# Patient Record
Sex: FEMALE | Race: WHITE | NOT HISPANIC OR LATINO | Employment: OTHER | URBAN - METROPOLITAN AREA
[De-identification: names, ages, dates, MRNs, and addresses within clinical notes are randomized per-mention and may not be internally consistent; named-entity substitution may affect disease eponyms.]

---

## 2017-05-01 ENCOUNTER — GENERIC CONVERSION - ENCOUNTER (OUTPATIENT)
Dept: OTHER | Facility: OTHER | Age: 77
End: 2017-05-01

## 2017-06-01 ENCOUNTER — GENERIC CONVERSION - ENCOUNTER (OUTPATIENT)
Dept: OTHER | Facility: OTHER | Age: 77
End: 2017-06-01

## 2017-06-05 ENCOUNTER — ALLSCRIPTS OFFICE VISIT (OUTPATIENT)
Dept: OTHER | Facility: OTHER | Age: 77
End: 2017-06-05

## 2017-06-05 ENCOUNTER — TRANSCRIBE ORDERS (OUTPATIENT)
Dept: ADMINISTRATIVE | Facility: HOSPITAL | Age: 77
End: 2017-06-05

## 2017-06-05 ENCOUNTER — APPOINTMENT (OUTPATIENT)
Dept: LAB | Facility: HOSPITAL | Age: 77
End: 2017-06-05
Attending: INTERNAL MEDICINE
Payer: COMMERCIAL

## 2017-06-05 DIAGNOSIS — I48.91 ATRIAL FIBRILLATION (HCC): ICD-10-CM

## 2017-06-05 DIAGNOSIS — G47.33 OBSTRUCTIVE SLEEP APNEA (ADULT) (PEDIATRIC): Primary | ICD-10-CM

## 2017-06-05 DIAGNOSIS — G47.33 OBSTRUCTIVE SLEEP APNEA: ICD-10-CM

## 2017-06-05 DIAGNOSIS — I48.91 ATRIAL FIBRILLATION, UNSPECIFIED TYPE (HCC): Primary | ICD-10-CM

## 2017-06-05 LAB
ANION GAP SERPL CALCULATED.3IONS-SCNC: 3 MMOL/L (ref 4–13)
BASOPHILS # BLD AUTO: 0.03 THOUSANDS/ΜL (ref 0–0.1)
BASOPHILS NFR BLD AUTO: 1 % (ref 0–1)
BUN SERPL-MCNC: 14 MG/DL (ref 5–25)
CALCIUM SERPL-MCNC: 9.1 MG/DL (ref 8.3–10.1)
CHLORIDE SERPL-SCNC: 106 MMOL/L (ref 100–108)
CO2 SERPL-SCNC: 33 MMOL/L (ref 21–32)
CREAT SERPL-MCNC: 0.98 MG/DL (ref 0.6–1.3)
EOSINOPHIL # BLD AUTO: 0.25 THOUSAND/ΜL (ref 0–0.61)
EOSINOPHIL NFR BLD AUTO: 5 % (ref 0–6)
ERYTHROCYTE [DISTWIDTH] IN BLOOD BY AUTOMATED COUNT: 12.9 % (ref 11.6–15.1)
GFR SERPL CREATININE-BSD FRML MDRD: 55 ML/MIN/1.73SQ M
GLUCOSE SERPL-MCNC: 101 MG/DL (ref 65–140)
HCT VFR BLD AUTO: 38.8 % (ref 34.8–46.1)
HGB BLD-MCNC: 12.7 G/DL (ref 11.5–15.4)
LYMPHOCYTES # BLD AUTO: 1.65 THOUSANDS/ΜL (ref 0.6–4.47)
LYMPHOCYTES NFR BLD AUTO: 33 % (ref 14–44)
MCH RBC QN AUTO: 32.1 PG (ref 26.8–34.3)
MCHC RBC AUTO-ENTMCNC: 32.7 G/DL (ref 31.4–37.4)
MCV RBC AUTO: 98 FL (ref 82–98)
MONOCYTES # BLD AUTO: 0.5 THOUSAND/ΜL (ref 0.17–1.22)
MONOCYTES NFR BLD AUTO: 10 % (ref 4–12)
NEUTROPHILS # BLD AUTO: 2.61 THOUSANDS/ΜL (ref 1.85–7.62)
NEUTS SEG NFR BLD AUTO: 51 % (ref 43–75)
NRBC BLD AUTO-RTO: 0 /100 WBCS
NT-PROBNP SERPL-MCNC: 874 PG/ML
PLATELET # BLD AUTO: 184 THOUSANDS/UL (ref 149–390)
PMV BLD AUTO: 10.2 FL (ref 8.9–12.7)
POTASSIUM SERPL-SCNC: 4.3 MMOL/L (ref 3.5–5.3)
RBC # BLD AUTO: 3.96 MILLION/UL (ref 3.81–5.12)
SODIUM SERPL-SCNC: 142 MMOL/L (ref 136–145)
TSH SERPL DL<=0.05 MIU/L-ACNC: 1.75 UIU/ML (ref 0.36–3.74)
WBC # BLD AUTO: 5.04 THOUSAND/UL (ref 4.31–10.16)

## 2017-06-05 PROCEDURE — 84443 ASSAY THYROID STIM HORMONE: CPT

## 2017-06-05 PROCEDURE — 36415 COLL VENOUS BLD VENIPUNCTURE: CPT

## 2017-06-05 PROCEDURE — 80048 BASIC METABOLIC PNL TOTAL CA: CPT

## 2017-06-05 PROCEDURE — 85025 COMPLETE CBC W/AUTO DIFF WBC: CPT

## 2017-06-05 PROCEDURE — 83880 ASSAY OF NATRIURETIC PEPTIDE: CPT

## 2017-06-06 ENCOUNTER — HOSPITAL ENCOUNTER (OUTPATIENT)
Dept: NON INVASIVE DIAGNOSTICS | Facility: CLINIC | Age: 77
Discharge: HOME | End: 2017-06-06
Payer: COMMERCIAL

## 2017-06-06 DIAGNOSIS — I48.91 ATRIAL FIBRILLATION, UNSPECIFIED TYPE (HCC): ICD-10-CM

## 2017-06-06 DIAGNOSIS — I48.91 ATRIAL FIBRILLATION (HCC): ICD-10-CM

## 2017-06-06 PROCEDURE — 93226 XTRNL ECG REC<48 HR SCAN A/R: CPT

## 2017-06-06 PROCEDURE — 93306 TTE W/DOPPLER COMPLETE: CPT

## 2017-06-06 PROCEDURE — 93225 XTRNL ECG REC<48 HRS REC: CPT

## 2017-06-09 ENCOUNTER — HOSPITAL ENCOUNTER (OUTPATIENT)
Dept: SLEEP CENTER | Facility: CLINIC | Age: 77
Discharge: HOME | End: 2017-06-09
Payer: COMMERCIAL

## 2017-06-09 DIAGNOSIS — G47.33 OBSTRUCTIVE SLEEP APNEA (ADULT) (PEDIATRIC): ICD-10-CM

## 2017-06-09 PROCEDURE — G0399 HOME SLEEP TEST/TYPE 3 PORTA: HCPCS

## 2017-06-12 ENCOUNTER — TRANSCRIBE ORDERS (OUTPATIENT)
Dept: SLEEP CENTER | Facility: CLINIC | Age: 77
End: 2017-06-12

## 2017-06-12 DIAGNOSIS — G47.33 OBSTRUCTIVE SLEEP APNEA (ADULT) (PEDIATRIC): Primary | ICD-10-CM

## 2017-06-15 ENCOUNTER — TRANSCRIBE ORDERS (OUTPATIENT)
Dept: SLEEP CENTER | Facility: CLINIC | Age: 77
End: 2017-06-15

## 2017-06-15 ENCOUNTER — HOSPITAL ENCOUNTER (OUTPATIENT)
Dept: SLEEP CENTER | Facility: CLINIC | Age: 77
Discharge: HOME | End: 2017-06-15
Payer: MEDICARE

## 2017-06-15 DIAGNOSIS — G47.33 OBSTRUCTIVE SLEEP APNEA (ADULT) (PEDIATRIC): ICD-10-CM

## 2017-06-15 DIAGNOSIS — G47.33 OBSTRUCTIVE SLEEP APNEA (ADULT) (PEDIATRIC): Primary | ICD-10-CM

## 2017-07-13 ENCOUNTER — ALLSCRIPTS OFFICE VISIT (OUTPATIENT)
Dept: OTHER | Facility: OTHER | Age: 77
End: 2017-07-13

## 2017-08-18 ENCOUNTER — HOSPITAL ENCOUNTER (OUTPATIENT)
Dept: SLEEP CENTER | Facility: CLINIC | Age: 77
Discharge: HOME | End: 2017-08-18
Payer: COMMERCIAL

## 2017-08-18 DIAGNOSIS — G47.33 OBSTRUCTIVE SLEEP APNEA (ADULT) (PEDIATRIC): ICD-10-CM

## 2017-08-18 PROCEDURE — 95811 POLYSOM 6/>YRS CPAP 4/> PARM: CPT

## 2017-08-28 ENCOUNTER — HOSPITAL ENCOUNTER (OUTPATIENT)
Dept: SLEEP CENTER | Facility: CLINIC | Age: 77
Discharge: HOME | End: 2017-08-28
Payer: COMMERCIAL

## 2017-08-28 DIAGNOSIS — G47.33 OBSTRUCTIVE SLEEP APNEA (ADULT) (PEDIATRIC): ICD-10-CM

## 2017-09-20 ENCOUNTER — TRANSCRIBE ORDERS (OUTPATIENT)
Dept: SLEEP CENTER | Facility: CLINIC | Age: 77
End: 2017-09-20

## 2017-09-20 DIAGNOSIS — G47.33 OSA (OBSTRUCTIVE SLEEP APNEA): Primary | ICD-10-CM

## 2017-09-25 ENCOUNTER — HOSPITAL ENCOUNTER (OUTPATIENT)
Dept: SLEEP CENTER | Facility: CLINIC | Age: 77
Discharge: HOME | End: 2017-09-25
Payer: MEDICARE

## 2017-09-25 DIAGNOSIS — G47.33 OSA (OBSTRUCTIVE SLEEP APNEA): ICD-10-CM

## 2017-10-05 ENCOUNTER — HOSPITAL ENCOUNTER (EMERGENCY)
Facility: HOSPITAL | Age: 77
Discharge: HOME | End: 2017-10-05
Attending: EMERGENCY MEDICINE | Admitting: EMERGENCY MEDICINE
Payer: COMMERCIAL

## 2017-10-05 ENCOUNTER — ALLSCRIPTS OFFICE VISIT (OUTPATIENT)
Dept: OTHER | Facility: OTHER | Age: 77
End: 2017-10-05

## 2017-10-05 VITALS
HEART RATE: 79 BPM | OXYGEN SATURATION: 97 % | SYSTOLIC BLOOD PRESSURE: 147 MMHG | TEMPERATURE: 97.6 F | WEIGHT: 171.5 LBS | RESPIRATION RATE: 18 BRPM | DIASTOLIC BLOOD PRESSURE: 66 MMHG

## 2017-10-05 DIAGNOSIS — M79.604 RIGHT LEG PAIN: ICD-10-CM

## 2017-10-05 DIAGNOSIS — M79.651 PAIN OF RIGHT THIGH: Primary | ICD-10-CM

## 2017-10-05 PROCEDURE — 99284 EMERGENCY DEPT VISIT MOD MDM: CPT

## 2017-10-06 NOTE — DISCHARGE INSTRUCTIONS
Leg Pain   WHAT YOU NEED TO KNOW:   Leg pain may be caused by a variety of health conditions  Your tests did not show any broken bones or blood clots  DISCHARGE INSTRUCTIONS:   Return to the emergency department if:   · You have a fever  · Your leg starts to swell  · Your leg pain gets worse  · You have numbness or tingling in your leg or toes  · You cannot put any weight on or move your leg  Contact your healthcare provider if:   · Your pain does not decrease, even after treatment  · You have questions or concerns about your condition or care  Medicines:   · NSAIDs , such as ibuprofen, help decrease swelling, pain, and fever  This medicine is available with or without a doctor's order  NSAIDs can cause stomach bleeding or kidney problems in certain people  If you take blood thinner medicine, always ask your healthcare provider if NSAIDs are safe for you  Always read the medicine label and follow directions  · Take your medicine as directed  Contact your healthcare provider if you think your medicine is not helping or if you have side effects  Tell him of her if you are allergic to any medicine  Keep a list of the medicines, vitamins, and herbs you take  Include the amounts, and when and why you take them  Bring the list or the pill bottles to follow-up visits  Carry your medicine list with you in case of an emergency  Follow up with your healthcare provider as directed: You may need more tests to find the cause of your leg pain  You may need to see an orthopedic specialist or a physical therapist  Write down your questions so you remember to ask them during your visits  Manage your leg pain:   · Rest  your injured leg so that it can heal  You may need an immobilizer, brace, or splint to limit the movement of your leg  You may need to avoid putting any weight on your leg for at least 48 hours  Return to normal activities as directed      · Ice  the injury for 20 minutes every 4 hours for up to 24 hours, or as directed  Use an ice pack, or put crushed ice in a plastic bag  Cover it with a towel to protect your skin  Ice helps prevent tissue damage and decreases swelling and pain  · Elevate  your injured leg above the level of your heart as often as you can  This will help decrease swelling and pain  If possible, prop your leg on pillows or blankets to keep the area elevated comfortably  · Use assistive devices as directed  You may need to use a cane or crutches  Assistive devices help decrease pain and pressure on your leg when you walk  Ask your healthcare provider for more information about assistive devices and how to use them correctly  · Maintain a healthy weight  Extra body weight can cause pressure and pain in your hip, knee, and ankle joints  Ask your healthcare provider how much you should weigh  Ask him to help you create a weight loss plan if you are overweight  © 2017 [employer] Information is for End User's use only and may not be sold, redistributed or otherwise used for commercial purposes  All illustrations and images included in CareNotes® are the copyrighted property of [employer]  or [employer]  The above information is an  only  It is not intended as medical advice for individual conditions or treatments  Talk to your doctor, nurse or pharmacist before following any medical regimen to see if it is safe and effective for you

## 2017-10-06 NOTE — PROGRESS NOTES
Assessment  Assessed    1  PAF (paroxysmal atrial fibrillation) (427 31) (I48 0)   2  Mild obstructive sleep apnea (327 23) (G47 33)   3  Moderate obstructive sleep apnea (327 23) (G47 33)    Plan  Atrial fibrillation    · EKG/ECG- POC; Status:Complete;   Done: [id number]   Perform: In Office; [phone]; Last Updated By:Priscilla Meek; 10/5/2017 3:03:29 PM;Ordered; For:Atrial fibrillation; Ordered By:Yi Phillip; Moderate obstructive sleep apnea    · Follow-up visit in 6 months Evaluation and Treatment  Follow-up  Status: Hold For -  Scheduling  Requested for: 89CDZ9220   Ordered; For: Moderate obstructive sleep apnea; Ordered By: Jaskaran Knutson Performed:  Due: 99DML6220    Discussion/Summary  Cardiology Discussion Summary Free Text Note Form [facility]:   1  Afib- [id number] of 2, on Xareltonot feel her afib- metoprolol to 25 mg BIDEF: 65%55 mmHgPulmonary HTN- PASP 55 mmHg on echo, normal RV function, no symptomsto OSAObesitySevere LENARD on home sleep study and lowest O2 sat 68%,lowest O2 sat 63%, 48 min under 90%- fitted for nasal CPAP  Chief Complaint  Chief Complaint Free Text Note Form: 3 mth f/u      History of Present Illness  Cardiology HPI Free Text Note Form [facility]: 68 yo female presents for first evaluation of new afib  Went to primary doctor for physical and EKG done showing afib with rate 140 bpm  She does not know how long she has been in it  NO chest pain or dyspnea  No edema  She was started on metoprolol and xarelto appropriately  No history of thyroid dysfunction  She does snore per her family  Holter done June 19 and not in afib  Echo done and EF: 65% with grade 2 DD and PASP 55 mmHg  Home sleep study revealed severe LENARD with lowest sat 68%, had 30 respiratory events  PSG, 8/19/17: lowest O2 sat 63%, 48 min under 90%- fitted for nasal CPAP  Review of Systems  Cardiology Female ROS:     Cardiac: palpitations present , but-No complaints of chest pain, no palpitations, no fainting     Skin: No complaints of nonhealing sores or skin rash  Genitourinary: No complaints of recurrent urinary tract infections, frequent urination at night, difficult urination, blood in urine, kidney stones, loss of bladder control, kidney problems, denies any birth control or hormone replacement, is not post menopausal, not currently pregnant  Psychological: No complaints of feeling depressed, anxiety, panic attacks, or difficulty concentrating  General: No complaints of trouble sleeping, lack of energy, fatigue, appetite changes, weight changes, fever, frequent infections, or night sweats  Respiratory: No complaints of shortness of breath, cough with sputum, or wheezing  HEENT: No complaints of serious problems, hearing problems, nose problems, throat problems, or snoring  Gastrointestinal: No complaints of liver problems, nausea, vomiting, heartburn, constipation, bloody stools, diarrhea, problems swallowing, adbominal pain, or rectal bleeding  Hematologic: No complaints of bleeding disorders, anemia, blood clots, or excessive brusing  Neurological: No complaints of numbness, tingling, dizziness, weakness, seizures, headaches, syncope or fainting, AM fatigue, daytime sleepiness, no witnessed apnea episodes  Musculoskeletal: No complaints of arthritis, back pain, or painfull swelling  Active Problems  Problems    1  Atrial fibrillation (427 31) (I48 91)   2  Mild obstructive sleep apnea (327 23) (G47 33)    Past Medical History  Problems    1  History of atrial fibrillation (V12 59) (Z86 79)  Active Problems And Past Medical History Reviewed: The active problems and past medical history were reviewed and updated today  Surgical History  Surgical History Reviewed: The surgical history was reviewed and updated today  Family History  Mother    1  Family history of lung cancer (V16 1) (Z80 1)  Grandparent    2  Family history of lung cancer (V16 1) (Z80 1)  Family History Reviewed:    The family history was reviewed and updated today  Social History  Social History Reviewed: The social history was reviewed and updated today  Current Meds   1  Metoprolol Tartrate 25 MG Oral Tablet; take one tablet two times a day; Therapy: 74ZFT7423 to (Ralf Real)  Requested for: 52QQQ8249; Last   Rx:05Jun2017 Ordered   2  Xarelto 20 MG Oral Tablet; Take 1 tablet daily; Therapy: 66XMR6530 to (Evaluate:11Vdl2645) Recorded  Medication List Reviewed: The medication list was reviewed and updated today  Allergies  Medication    1  No Known Drug Allergies    Vitals  Vital Signs    Recorded: 42BNS3861 03:00PM   Heart Rate 77   Systolic 539, RUE, Sitting   Diastolic 70, RUE, Sitting   BP CUFF SIZE Large   Height 4 ft 11 in   Weight 171 lb 8 oz   BMI Calculated 34 64   BSA Calculated 1 73   O2 Saturation 97     Physical Exam    Constitutional   General appearance: No acute distress, well appearing and well nourished  Eyes   Conjunctiva and Sclera examination: Conjunctiva pink, sclera anicteric  Ears, Nose, Mouth, and Throat - Oropharynx: Clear, nares are clear, mucous membranes are moist    Neck   Neck and thyroid: Normal, supple, trachea midline, no thyromegaly  Pulmonary   Respiratory effort: No increased work of breathing or signs of respiratory distress  Auscultation of lungs: Clear to auscultation, no rales, no rhonchi, no wheezing, good air movement  Cardiovascular   Auscultation of heart: Abnormal  -irregularly irregular  Carotid pulses: Normal, 2+ bilaterally  Peripheral vascular exam: Normal pulses throughout, no tenderness, erythema or swelling  Pedal pulses: Normal, 2+ bilaterally  Examination of extremities for edema and/or varicosities: Normal     Abdomen   Abdomen: Non-tender and no distention  Liver and spleen: No hepatomegaly or splenomegaly  Musculoskeletal Gait and station: Normal gait  -Digits and nails: Normal without clubbing or cyanosis -Inspection/palpation of joints, bones, and muscles: Normal, ROM normal     Skin - Skin and subcutaneous tissue: Normal without rashes or lesions  Skin is warm and well perfused, normal turgor  Neurologic - Cranial nerves: II - XII intact -Speech: Normal     Psychiatric - Orientation to person, place, and time: Normal -Mood and affect: Normal       Results/Data  Diagnostic Studies Reviewed Cardio:   Echocardiogram/DENZEL: June 6: EF: 65%diastolic [id number] mmHg  Holter/Event Recorder: Holter: no afib     ECG Report: SR 75 bpm      Signatures   Electronically signed by : Gautam Chakraborty DO; Oct  5 2017  3:22PM EST                       (Author)

## 2017-10-06 NOTE — ED PROVIDER NOTES
History  Chief Complaint   Patient presents with    Leg Pain     Patient arrives via EMS from home  Patient states she woke up this evening with excruciating right upper leg pain, "it felt like my veins were going to pop"  Patient reports being see by cardiologist today with no issues  Denies all other complaints  69-year-old female presents to the emergency department via ambulance for severe or right medial lower thigh knee pain  Patient states this sensation woke her from her sleep  The patient alerted EMS and presents for evaluation  The patient states prior to evaluation she has had near complete resolution of her symptoms  Patient denies chest pain shortness of breath fevers chills abdominal pain  Patient takes Xarelto for A fib  Patient just recently seen and evaluated by a cardiologist and patient states she was told she was doing well  Prior to Admission Medications   Prescriptions Last Dose Informant Patient Reported? Taking?   metoprolol tartrate (LOPRESSOR) 25 mg tablet   Yes Yes   Sig: Take 25 mg by mouth 2 (two) times a day   rivaroxaban (XARELTO) 20 mg tablet   Yes Yes   Sig: Take 20 mg by mouth daily      Facility-Administered Medications: None       Past Medical History:   Diagnosis Date    Hypertension        History reviewed  No pertinent surgical history  History reviewed  No pertinent family history  I have reviewed and agree with the history as documented  Social History   Substance Use Topics    Smoking status: Never Smoker    Smokeless tobacco: Never Used    Alcohol use No        Review of Systems   Constitutional: Negative for chills and fever  Eyes: Negative for pain  Respiratory: Negative for cough  Cardiovascular: Negative for leg swelling  Genitourinary: Negative for flank pain  Musculoskeletal: Negative for back pain  R medial knee pain  Stabbing pain  Psychiatric/Behavioral: Negative for agitation         Physical Exam  ED Triage Vitals   Temperature Pulse Respirations Blood Pressure SpO2   10/05/17 2216 10/05/17 2215 10/05/17 2215 10/05/17 2215 10/05/17 2215   97 6 °F (36 4 °C) 79 18 147/66 97 %      Temp Source Heart Rate Source Patient Position - Orthostatic VS BP Location FiO2 (%)   10/05/17 2215 10/05/17 2215 10/05/17 2215 10/05/17 2215 --   Oral Monitor Lying Right arm       Pain Score       10/05/17 2215       No Pain           Physical Exam   Constitutional: She is oriented to person, place, and time  She appears well-developed and well-nourished  HENT:   Head: Normocephalic and atraumatic  Eyes: Conjunctivae are normal    Neck: Neck supple  Cardiovascular: Normal rate  Pulmonary/Chest: Effort normal    Abdominal: Soft  Musculoskeletal: Normal range of motion  Normal right lower extremity exam   Patient has full sensation light touch in the lower extremity from  toes to mid thigh  The patient's foot is warm, calf is warm and upper leg is warm  Pulses are intact in the posterior knee as well as distal ankle  Patient does have significant varicosities superficial none are tender at this time  Neurological: She is alert and oriented to person, place, and time  Skin: Skin is warm and dry  Capillary refill takes less than 2 seconds  ED Medications  Medications - No data to display    Diagnostic Studies  Labs Reviewed - No data to display    VAS lower limb venous duplex study, complete bilateral    (Results Pending)   VAS flores single level    (Results Pending)       Procedures  Procedures      Phone Contacts  ED Phone Contact    ED Course  ED Course                                MDM  Number of Diagnoses or Management Options  Pain of right thigh:   Right leg pain:   Diagnosis management comments: Patient with near complete resolution of symptoms prior to exam   Patient has benign exam of bilateral lower extremities no area of tenderness no swelling of the lower extremities    Patient is ambulatory in room without dysfunction  At this time will prescribe ultrasound duplex with also ultrasound with ultrasound artery and ankle brachial index  Encourage close follow-up with primary care  Encourage patient to continue to take her Xarelto  Educated patient of persistent or worsening signs symptoms and to follow up with primary care or return to the emergency department  Patient and daughter admit to understanding and agreement  CritCare Time    Disposition  Final diagnoses:   Pain of right thigh   Right leg pain     ED Disposition     ED Disposition Condition Comment    Discharge  Wilbert Yates discharge to home/self care  Condition at discharge: Stable        Follow-up Information     Follow up With Specialties Details Why Contact Info    Jett Nielson MD Family Medicine In 1 day  [facility]  [address]          Discharge Medication List as of 10/5/2017 10:55 PM      CONTINUE these medications which have NOT CHANGED    Details   metoprolol tartrate (LOPRESSOR) 25 mg tablet Take 25 mg by mouth 2 (two) times a day, Starting Mon 6/5/2017, Historical Med      rivaroxaban (XARELTO) 20 mg tablet Take 20 mg by mouth daily, Starting Mon 6/5/2017, Historical Med             Outpatient Discharge Orders  VAS lower limb venous duplex study, complete bilateral   Standing Status: Future  Standing Exp  Date: 10/05/21     VAS flores single level   Standing Status: Future  Standing Exp   Date: 10/05/21         ED Provider  Electronically Signed by       Marguerite Tinajero PA-C  10/06/17 0327

## 2017-11-10 ENCOUNTER — TRANSCRIBE ORDERS (OUTPATIENT)
Dept: SLEEP CENTER | Facility: CLINIC | Age: 77
End: 2017-11-10

## 2017-11-10 ENCOUNTER — HOSPITAL ENCOUNTER (OUTPATIENT)
Dept: SLEEP CENTER | Facility: CLINIC | Age: 77
Discharge: HOME | End: 2017-11-10
Payer: MEDICARE

## 2017-11-10 DIAGNOSIS — G47.33 OBSTRUCTIVE SLEEP APNEA: ICD-10-CM

## 2017-11-10 DIAGNOSIS — G47.33 OBSTRUCTIVE SLEEP APNEA SYNDROME: Primary | ICD-10-CM

## 2017-11-11 NOTE — PROGRESS NOTES
Progress Note - Sleep Center   Srinivas Walden 65 y o  female MRN: [id number]        Follow Up Evaluation / Problem:     1  Obstructive sleep apnea  2  New onset atrial fibrillation     HPI: Srinivas Walden is a 65y o  year old female  She was previously diagnosed with obstructive sleep apnea based on a home sleep test which demonstrated an AHI of 38 3 abnormal breathing events per hour  Nasal BiPAP was titrated on 08/18/2007  She required 9 cm of IPAP and 5 cm of EPAP to maintain upper airway patency during sleep  She seemed to tolerate treatment well  She has been using that level treatment since receiving equipment  ROS: A comprehensive review of systems revealed no new problems or complaints  Historical Information     Past History Since Last Sleep Center Visit:     She has been using her BiPAP without difficulty  Today her compliance data was reviewed  Between 10/09/2017 and 11/07/2017 she used her equipment 100% of the time  90% of the days measured showed more than 4 hours of usage time  She reports feeling more awake and alert than she did prior to treatment  She has been tolerating treatment without significant difficulty  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  ALLERGIES  Available on medication sheet in chart     MEDICATION  Available on medication sheet in chart    OBJECTIVE    Vital signs areavailable in patient's chart    PAP Pressure:  Nasal BiPAP using 9 cm of IPAP and 5 cm of EPAP  DME Provider: [employer]    Physical Exam: No significant changes since previous examination      ASSESSMENT / PLAN    Assessment:   1  Obstructive sleep apnea  2  Doing well using nasal BiPAP as noted above  3  Status post left corneal transplant  4  History of atrial fibrillation    Plan:  1  Continue nasal BiPAP at settings noted above  2  Return for routine re-evaluation in 6 months  3    Provide prescription for refill of BiPAP supplies at the time of her revisit    Counseling / Coordination of Care  Total clinic time spent today 20 minutes  Greater than 50% of total time was spent with The patient and / or family counseling and / or coordination of care  A description of the counseling and / or coordination of care: Most of our time was spent discussing her current use of nasal BiPAP  We reviewed her compliance data together with her daughter  She seems to be tolerating treatment well and shows excellent compliance  Her average AHI is 3 4 abnormal breathing events per hour  Her mask leakage is minimal   She will continue using nasal BiPAP set to deliver 5 cm of EPAP and 9 cm of IPAP  I have asked her to return in 6 months for routine re-evaluation  I will provide her with a prescription for CPAP supplies at that time  She will contact me at the [facility] if any problems arise prior to her visit  Mati Benoit DO    Board Certified in [place] [employer]

## 2017-11-14 ENCOUNTER — HOSPITAL ENCOUNTER (OUTPATIENT)
Dept: NON INVASIVE DIAGNOSTICS | Facility: HOSPITAL | Age: 77
Discharge: HOME | End: 2017-11-14
Payer: COMMERCIAL

## 2017-11-14 DIAGNOSIS — M79.651 PAIN OF RIGHT THIGH: ICD-10-CM

## 2017-11-14 PROCEDURE — 93970 EXTREMITY STUDY: CPT

## 2017-11-14 PROCEDURE — 93922 UPR/L XTREMITY ART 2 LEVELS: CPT

## 2018-01-12 VITALS
HEART RATE: 90 BPM | WEIGHT: 173.56 LBS | BODY MASS INDEX: 34.99 KG/M2 | HEIGHT: 59 IN | OXYGEN SATURATION: 99 % | SYSTOLIC BLOOD PRESSURE: 118 MMHG | DIASTOLIC BLOOD PRESSURE: 74 MMHG

## 2018-01-13 VITALS
SYSTOLIC BLOOD PRESSURE: 140 MMHG | OXYGEN SATURATION: 96 % | WEIGHT: 180.56 LBS | BODY MASS INDEX: 36.4 KG/M2 | HEART RATE: 86 BPM | DIASTOLIC BLOOD PRESSURE: 78 MMHG | HEIGHT: 59 IN

## 2018-01-14 VITALS
HEART RATE: 77 BPM | WEIGHT: 171.5 LBS | SYSTOLIC BLOOD PRESSURE: 126 MMHG | BODY MASS INDEX: 34.57 KG/M2 | OXYGEN SATURATION: 97 % | HEIGHT: 59 IN | DIASTOLIC BLOOD PRESSURE: 70 MMHG

## 2018-04-13 ENCOUNTER — OFFICE VISIT (OUTPATIENT)
Dept: CARDIOLOGY CLINIC | Facility: CLINIC | Age: 78
End: 2018-04-13
Payer: COMMERCIAL

## 2018-04-13 VITALS
HEIGHT: 59 IN | WEIGHT: 173 LBS | BODY MASS INDEX: 34.88 KG/M2 | HEART RATE: 68 BPM | SYSTOLIC BLOOD PRESSURE: 124 MMHG | DIASTOLIC BLOOD PRESSURE: 66 MMHG | OXYGEN SATURATION: 97 %

## 2018-04-13 DIAGNOSIS — I27.20 PULMONARY HTN (HCC): ICD-10-CM

## 2018-04-13 DIAGNOSIS — E66.9 OBESITY (BMI 30.0-34.9): ICD-10-CM

## 2018-04-13 DIAGNOSIS — I48.0 PAF (PAROXYSMAL ATRIAL FIBRILLATION) (HCC): Primary | ICD-10-CM

## 2018-04-13 PROBLEM — G47.33 OSA ON CPAP: Status: ACTIVE | Noted: 2018-04-13

## 2018-04-13 PROBLEM — Z99.89 OSA ON CPAP: Status: ACTIVE | Noted: 2018-04-13

## 2018-04-13 PROCEDURE — 99214 OFFICE O/P EST MOD 30 MIN: CPT | Performed by: INTERNAL MEDICINE

## 2018-04-13 RX ORDER — PREDNISOLONE ACETATE 10 MG/ML
SUSPENSION/ DROPS OPHTHALMIC
COMMUNITY
Start: 2018-02-15 | End: 2018-07-24

## 2018-04-13 RX ORDER — TIMOLOL MALEATE 5 MG/ML
SOLUTION OPHTHALMIC
COMMUNITY
Start: 2018-02-16

## 2018-04-13 NOTE — PROGRESS NOTES
Cardiology Outpatient Progress Note - Tami Salas 65 y o  female MRN: [id number]    @ Encounter: [id number]      There are no active problems to display for this patient  Assessment:  # Afib- [id number] of 2, on Xarelto  not feel her afib- metoprolol tartrate to 25 mg BID  EF: 65% PASP 55 mmHg  # Pulmonary HTN- PASP 55 mmHg on echo, normal RV function, no symptoms  # Obesity  # Severe LENARD on home sleep study and lowest O2 sat 68%,   Polysomnogram 8/19/17: lowest O2 sat 63%, 48 min under 90%- fitted for nasal CPAP  TODAY'S PLAN:  Check an echo for pa pressures given that she has been on cpap    HPI:   64 yo female presents for first evaluation of new afib  Went to primary doctor for physical and EKG done showing afib with rate 140 bpm  She does not know how long she has been in it  NO chest pain or dyspnea  No edema  She was started on metoprolol and xarelto appropriately  No history of thyroid dysfunction  She does snore per her family  Holter done June 19 and not in afib  Echo done and EF: 65% with grade 2 DD and PASP 55 mmHg  Home sleep study revealed severe LENARD with lowest sat 68%, had 30 respiratory events  PSG, 8/19/17: lowest O2 sat 63%, 48 min under 90%- fitted for nasal CPAP  Interval History:       Past Medical History:   Diagnosis Date    Hypertension        Review of Systems - Negative except NO DYSPNEA, NO CHEST PAIN    No Known Allergies    Current Outpatient Prescriptions:     metoprolol tartrate (LOPRESSOR) 25 mg tablet, Take 25 mg by mouth 2 (two) times a day, Disp: , Rfl:     prednisoLONE acetate (PRED FORTE) 1 % ophthalmic suspension, , Disp: , Rfl:     rivaroxaban (XARELTO) 20 mg tablet, Take 20 mg by mouth daily, Disp: , Rfl:     timolol (TIMOPTIC-XE) 0 5 % ophthalmic gel-forming, , Disp: , Rfl:     Social History     Social History    Marital status:       Spouse name: N/A    Number of children: N/A    Years of education: N/A     Occupational History    Not on file      Social History Main Topics    Smoking status: Never Smoker    Smokeless tobacco: Never Used    Alcohol use No    Drug use: No    Sexual activity: Not on file     Other Topics Concern    Not on file     Social History Narrative    No narrative on file       No family history on file  Physical Exam:    Vitals: Blood pressure 124/66, pulse 68, height 4' 11" (1 499 m), weight 78 5 kg (173 lb), SpO2 97 %  , Body mass index is 34 94 kg/m² ,   Wt Readings from Last 3 Encounters:   04/13/18 78 5 kg (173 lb)   10/05/17 77 8 kg (171 lb 8 oz)   10/05/17 77 8 kg (171 lb 8 oz)         Physical Exam:    GEN: Fede Gong appears well, alert and oriented x 3, pleasant and cooperative   HEENT: pupils equal, round, and reactive to light; extraocular muscles intact  NECK: supple, no carotid bruits   HEART: regular rhythm, normal S1 and S2, no murmurs, clicks, gallops or rubs, JVP is    LUNGS: clear to auscultation bilaterally; no wheezes, rales, or rhonchi   ABDOMEN: normal bowel sounds, soft, no tenderness, no distention  EXTREMITIES: peripheral pulses normal; no clubbing, cyanosis, or edema  NEURO: no focal findings   SKIN: normal without suspicious lesions on exposed skin    Labs & Results:    Lab Results   Component Value Date    GLUCOSE 101 06/05/2017    CALCIUM 9 1 06/05/2017     06/05/2017    K 4 3 06/05/2017    CO2 33 (H) 06/05/2017     06/05/2017    BUN 14 06/05/2017    CREATININE 0 98 06/05/2017     Lab Results   Component Value Date    WBC 5 04 06/05/2017    HGB 12 7 06/05/2017    HCT 38 8 06/05/2017    MCV 98 06/05/2017     06/05/2017       EKG personally reviewed by Danii Nichole DO  Counseling / Coordination of Care  Total floor / unit time spent today 25 minutes  Greater than 50% of total time was spent with the patient and / or family counseling and / or coordination of care  A description of the counseling / coordination of care: 15        Thank you for the opportunity to participate in the care of this patient    DARINEL VOGT [address] Dora Fowler

## 2018-05-07 ENCOUNTER — HOSPITAL ENCOUNTER (OUTPATIENT)
Dept: NON INVASIVE DIAGNOSTICS | Facility: CLINIC | Age: 78
Discharge: HOME | End: 2018-05-07
Payer: COMMERCIAL

## 2018-05-07 DIAGNOSIS — I27.20 PULMONARY HTN (HCC): ICD-10-CM

## 2018-05-07 PROCEDURE — 93306 TTE W/DOPPLER COMPLETE: CPT | Performed by: INTERNAL MEDICINE

## 2018-05-07 PROCEDURE — 93306 TTE W/DOPPLER COMPLETE: CPT

## 2018-06-18 ENCOUNTER — OFFICE VISIT (OUTPATIENT)
Dept: SLEEP CENTER | Facility: CLINIC | Age: 78
End: 2018-06-18
Payer: COMMERCIAL

## 2018-06-18 VITALS
HEART RATE: 90 BPM | SYSTOLIC BLOOD PRESSURE: 122 MMHG | BODY MASS INDEX: 35.16 KG/M2 | DIASTOLIC BLOOD PRESSURE: 78 MMHG | WEIGHT: 174.4 LBS | HEIGHT: 59 IN

## 2018-06-18 DIAGNOSIS — E66.01 CLASS 2 SEVERE OBESITY DUE TO EXCESS CALORIES WITH SERIOUS COMORBIDITY AND BODY MASS INDEX (BMI) OF 35.0 TO 35.9 IN ADULT (HCC): ICD-10-CM

## 2018-06-18 DIAGNOSIS — G47.33 OSA ON CPAP: Primary | ICD-10-CM

## 2018-06-18 DIAGNOSIS — Z99.89 OSA ON CPAP: Primary | ICD-10-CM

## 2018-06-18 PROBLEM — I48.0 PAF (PAROXYSMAL ATRIAL FIBRILLATION) (HCC): Status: ACTIVE | Noted: 2017-10-05

## 2018-06-18 PROBLEM — IMO0001 CLASS 2 OBESITY WITH SERIOUS COMORBIDITY AND BODY MASS INDEX (BMI) OF 35.0 TO 35.9 IN ADULT: Status: ACTIVE | Noted: 2018-04-13

## 2018-06-18 PROCEDURE — 99214 OFFICE O/P EST MOD 30 MIN: CPT | Performed by: NURSE PRACTITIONER

## 2018-06-18 NOTE — PATIENT INSTRUCTIONS
1   Continue use of CPAP equipment nightly  2  Continue to clean your equipment, as discussed - may consider SoClean machine, which you may be able to find on [employer]  3  Contact the Sleep [address] [place] [address] with any questions or concerns prior to your next visit, as needed  4    Schedule visit for follow-up in 6 months

## 2018-06-18 NOTE — PROGRESS NOTES
Progress Note - Isabel [age] y o  female   MFD:9/5/2092, MRN: [id number]  6/18/2018          Follow Up Evaluation / Problem:     Obstructive Sleep Apnea    HPI: Chacha Lofton is a 65y o  year old female  She presents today to follow-up regarding obstructive sleep apnea  She was diagnosed in 2017 after she was found to have paroxysmal atrial fibrillation  She states that when she first received her CPAP, she did not always use it faithfully, however, now she uses it each night  She reports that she receives supplies and cleans her equipment regularly  Review of Systems      Genitourinary none   Cardiology none   Gastrointestinal none   Neurology awaken with headache   Constitutional none   Integumentary none   Psychiatry none   Musculoskeletal none   Pulmonary shortness of breath with activity   ENT none   Endocrine none   Hematological none       Current Outpatient Prescriptions:     metoprolol tartrate (LOPRESSOR) 25 mg tablet, Take 25 mg by mouth 2 (two) times a day, Disp: , Rfl:     prednisoLONE acetate (PRED FORTE) 1 % ophthalmic suspension, , Disp: , Rfl:     rivaroxaban (XARELTO) 20 mg tablet, Take 20 mg by mouth daily, Disp: , Rfl:     Tetrahydrozoline HCl (EYE DROPS OP), Apply to eye, Disp: , Rfl:     timolol (TIMOPTIC-XE) 0 5 % ophthalmic gel-forming, , Disp: , Rfl:     [place] Sleepiness Scale  Sitting and reading: Would never doze  Watching TV: Would never doze  Sitting, inactive in a public place (e g  a theatre or a meeting):  Would never doze  As a passenger in a car for an hour without a break: Would never doze  Lying down to rest in the afternoon when circumstances permit: Would never doze  Sitting and talking to someone: Would never doze  Sitting quietly after a lunch without alcohol: Would never doze  In a car, while stopped for a few minutes in traffic: Would never doze  Total score: 0              Vitals:    06/18/18 1300   BP: 122/78   Pulse: 90   Weight: 79 1 kg (174 lb 6 4 oz)   Height: 4' 11" (1 499 m)       Body mass index is 35 22 kg/m²  Neck Circumference: 32 (cm)       EPWORTH SLEEPINESS SCORE  Total score: 0      Past History Since Last Sleep Center Visit:     She denies any changes to her health since her last visit in November 2017  She feels very refreshed upon awakening in the morning  She is very busy with work and caring for he great grandchild  She reports that she occasionally awakens with a headache, but this is infrequent  The review of systems and following portions of the patient's history were reviewed and updated as appropriate: allergies, current medications, past family history, past medical history, past social history, past surgical history, and problem list     OBJECTIVE    PAP Pressure: Nasal BiPAP set to deliver 9 cm of IPAP and 5 cm of EPAP  DME Provider: [facility]    Physical Exam:     General Appearance:   Alert, cooperative, no distress, appears stated age, obese     Head:   Normocephalic, without obvious abnormality, atraumatic     Eyes:   PERRL, conjunctiva/corneas clear, EOM's intact          Nose:  Nares normal, septum midline, no drainage or sinus tenderness           Throat:  Lips, teeth and gums normal; tongue normal size and  shape and midline mucosa moist and redundant bilaterally, uvula normal, barely visible, tonsils not visualized, Mallampati class 4       Neck:  Supple, symmetrical, trachea midline, no adenopathy; Thyroid: No enlargement, tenderness or nodules; no carotid bruit or JVD     Lungs:      Clear to auscultation bilaterally, respirations unlabored     Heart:   Irregularly irregular rate and rhythm, S1 and S2 normal, no murmur noted       Extremities:  Extremities normal, atraumatic, no cyanosis or edema     Pulses:  2+ and symmetric all extremities     Skin:  Skin color, texture, turgor normal, no rashes or lesions       Neurologic:  CNII-XII intact   Normal strength, sensation throughout       ASSESSMENT / PLAN    1  LENARD on CPAP  PAP DME Resupply/Reorder   2  Class 2 severe obesity due to excess calories with serious comorbidity and body mass index (BMI) of 35 0 to 35 9 in adult [facility])             Counseling / Coordination of Care  Total clinic time spent today 25 minutes  Greater than 50% of total time was spent with the patient and / or family counseling and / or coordination of care  A description of the counseling / coordination of care:     Impressions, Diagnostic results, Prognosis, Instructions for management, Risks and benefits of treatment, Patient and family education, Risk factor reductions and Importance of compliance with treatment    Today I reviewed the patient's compliance data  she has been able to use the equipment 100% of all days recorded  Average usage was 4 or more hours 93 3% of all days recorded  The estimated AHI is 2 9 abnormal breathing events per hour  Response to treatment has been good  Supplies have been ordered for the next 12 months  She was inquiring about the "SoClean" machine and the effectiveness of its use  We discussed that due to her very busy lifestyle, this may be a very helpful tool in keeping her equipment clean  She plans to look into the possibility of purchasing this equipment  She will continue using her CPAP equipment at the BiPAP settings noted above for the next 6 months  At that timeshe will then return for a routine follow-up evaluation  I have asked him to contact the [address] if he encounters any difficulties prior to that time  The following instructions have been given to the patient today:    Patient Instructions   1  Continue use of CPAP equipment nightly  2  Continue to clean your equipment, as discussed - may consider SoClean machine, which you may be able to find on [address]  3  Contact the [address] with any questions or concerns prior to your next visit, as needed  4  Schedule visit for follow-up in 6 months        Chantel Osuna, [address] [facility]

## 2018-07-24 ENCOUNTER — HOSPITAL ENCOUNTER (INPATIENT)
Facility: HOSPITAL | Age: 78
LOS: 1 days | Discharge: HOME | DRG: 066 | End: 2018-07-25
Attending: EMERGENCY MEDICINE | Admitting: FAMILY MEDICINE
Payer: COMMERCIAL

## 2018-07-24 ENCOUNTER — APPOINTMENT (EMERGENCY)
Dept: RADIOLOGY | Facility: HOSPITAL | Age: 78
DRG: 066 | End: 2018-07-24
Payer: COMMERCIAL

## 2018-07-24 ENCOUNTER — APPOINTMENT (EMERGENCY)
Dept: CT IMAGING | Facility: HOSPITAL | Age: 78
DRG: 066 | End: 2018-07-24
Payer: COMMERCIAL

## 2018-07-24 DIAGNOSIS — I63.9 ACUTE EMBOLIC STROKE (HCC): ICD-10-CM

## 2018-07-24 DIAGNOSIS — G45.8 OTHER SPECIFIED TRANSIENT CEREBRAL ISCHEMIAS: Primary | ICD-10-CM

## 2018-07-24 DIAGNOSIS — G45.9 TIA (TRANSIENT ISCHEMIC ATTACK): ICD-10-CM

## 2018-07-24 DIAGNOSIS — R60.0 LOWER EXTREMITY EDEMA: ICD-10-CM

## 2018-07-24 DIAGNOSIS — E66.01 CLASS 2 SEVERE OBESITY DUE TO EXCESS CALORIES WITH SERIOUS COMORBIDITY AND BODY MASS INDEX (BMI) OF 35.0 TO 35.9 IN ADULT (HCC): ICD-10-CM

## 2018-07-24 PROBLEM — I48.20 CHRONIC A-FIB (HCC): Status: ACTIVE | Noted: 2018-07-24

## 2018-07-24 LAB
ALBUMIN SERPL BCP-MCNC: 3.5 G/DL (ref 3–5.2)
ALP SERPL-CCNC: 83 U/L (ref 43–122)
ALT SERPL W P-5'-P-CCNC: 30 U/L (ref 9–52)
ANION GAP SERPL CALCULATED.3IONS-SCNC: 6 MMOL/L (ref 5–14)
APTT PPP: 33 SECONDS (ref 23–34)
AST SERPL W P-5'-P-CCNC: 26 U/L (ref 14–36)
BILIRUB SERPL-MCNC: 0.4 MG/DL
BUN SERPL-MCNC: 22 MG/DL (ref 5–25)
CALCIUM SERPL-MCNC: 9 MG/DL (ref 8.4–10.2)
CHLORIDE SERPL-SCNC: 106 MMOL/L (ref 97–108)
CO2 SERPL-SCNC: 30 MMOL/L (ref 22–30)
CREAT SERPL-MCNC: 1 MG/DL (ref 0.6–1.2)
ERYTHROCYTE [DISTWIDTH] IN BLOOD BY AUTOMATED COUNT: 13.2 %
GFR SERPL CREATININE-BSD FRML MDRD: 54 ML/MIN/1.73SQ M
GLUCOSE SERPL-MCNC: 101 MG/DL (ref 70–99)
HCT VFR BLD AUTO: 39.5 % (ref 36–46)
HGB BLD-MCNC: 13.1 G/DL (ref 12–16)
INR PPP: 1.22 (ref 0.89–1.1)
MCH RBC QN AUTO: 32.8 PG (ref 26–34)
MCHC RBC AUTO-ENTMCNC: 33.1 G/DL (ref 31–36)
MCV RBC AUTO: 99 FL (ref 80–100)
PLATELET # BLD AUTO: 166 THOUSANDS/UL (ref 150–450)
PMV BLD AUTO: 8.4 FL (ref 8.9–12.7)
POTASSIUM SERPL-SCNC: 4.3 MMOL/L (ref 3.6–5)
PROT SERPL-MCNC: 6.6 G/DL (ref 5.9–8.4)
PROTHROMBIN TIME: 12.8 SECONDS (ref 9.5–11.6)
RBC # BLD AUTO: 3.98 MILLION/UL (ref 4–5.2)
SODIUM SERPL-SCNC: 142 MMOL/L (ref 137–147)
TROPONIN I SERPL-MCNC: <0.01 NG/ML (ref 0–0.03)
TSH SERPL DL<=0.05 MIU/L-ACNC: 1.56 UIU/ML (ref 0.47–4.68)
WBC # BLD AUTO: 5.5 THOUSAND/UL (ref 4.5–11)

## 2018-07-24 PROCEDURE — 36415 COLL VENOUS BLD VENIPUNCTURE: CPT | Performed by: EMERGENCY MEDICINE

## 2018-07-24 PROCEDURE — 99285 EMERGENCY DEPT VISIT HI MDM: CPT

## 2018-07-24 PROCEDURE — 85027 COMPLETE CBC AUTOMATED: CPT | Performed by: EMERGENCY MEDICINE

## 2018-07-24 PROCEDURE — 85610 PROTHROMBIN TIME: CPT | Performed by: EMERGENCY MEDICINE

## 2018-07-24 PROCEDURE — 84484 ASSAY OF TROPONIN QUANT: CPT | Performed by: EMERGENCY MEDICINE

## 2018-07-24 PROCEDURE — 80053 COMPREHEN METABOLIC PANEL: CPT | Performed by: EMERGENCY MEDICINE

## 2018-07-24 PROCEDURE — 71046 X-RAY EXAM CHEST 2 VIEWS: CPT

## 2018-07-24 PROCEDURE — 85730 THROMBOPLASTIN TIME PARTIAL: CPT | Performed by: EMERGENCY MEDICINE

## 2018-07-24 PROCEDURE — 70498 CT ANGIOGRAPHY NECK: CPT

## 2018-07-24 PROCEDURE — 99222 1ST HOSP IP/OBS MODERATE 55: CPT | Performed by: FAMILY MEDICINE

## 2018-07-24 PROCEDURE — 93005 ELECTROCARDIOGRAM TRACING: CPT

## 2018-07-24 PROCEDURE — 84443 ASSAY THYROID STIM HORMONE: CPT | Performed by: FAMILY MEDICINE

## 2018-07-24 PROCEDURE — 70496 CT ANGIOGRAPHY HEAD: CPT

## 2018-07-24 RX ORDER — SODIUM CHLORIDE 9 MG/ML
125 INJECTION, SOLUTION INTRAVENOUS CONTINUOUS
Status: DISCONTINUED | OUTPATIENT
Start: 2018-07-24 | End: 2018-07-25

## 2018-07-24 RX ORDER — MONTELUKAST SODIUM 10 MG/1
10 TABLET ORAL EVERY EVENING
Refills: 0 | COMMUNITY
Start: 2018-06-19

## 2018-07-24 RX ORDER — ATORVASTATIN CALCIUM 40 MG/1
40 TABLET, FILM COATED ORAL EVERY EVENING
Status: DISCONTINUED | OUTPATIENT
Start: 2018-07-24 | End: 2018-07-25 | Stop reason: HOSPADM

## 2018-07-24 RX ADMIN — SODIUM CHLORIDE 125 ML/HR: 9 INJECTION, SOLUTION INTRAVENOUS at 21:26

## 2018-07-24 RX ADMIN — IOHEXOL 85 ML: 350 INJECTION, SOLUTION INTRAVENOUS at 15:45

## 2018-07-24 RX ADMIN — ATORVASTATIN CALCIUM 40 MG: 40 TABLET, FILM COATED ORAL at 22:04

## 2018-07-24 RX ADMIN — SODIUM CHLORIDE 500 ML: 9 INJECTION, SOLUTION INTRAVENOUS at 21:25

## 2018-07-24 RX ADMIN — METOPROLOL TARTRATE 25 MG: 25 TABLET, FILM COATED ORAL at 22:04

## 2018-07-24 NOTE — H&P
H&P- Pratima Clifford 1940, 65 y o  female MRN: [id number]    Unit/Bed#: ED 02 Encounter: [id number]    Primary Care Provider: Audrey Duarte MD   Date and time admitted to hospital: 7/24/2018  1:41 PM        TIA (transient ischemic attack)   Assessment & Plan    · Patient had transient neurological symptoms today with the dizziness slurry speech that lasted for 40 min and result suspect this is TI A  She is already on Xarelto with history of atrial fibrillation  He never had a history of stroke  ER over discussed with Neurology CTs were all normal CT of the brain was normal as well tomorrow receive MRI  · I started a statin and her lipid panel tomorrow will discuss with Neurology and along if needed with Dr Tompkins Feeling her cardiologist in terms of of possible switching her to Coumadin  · 2D echo bubble study the rest is stroke pathway orders ordered as well        Chronic a-fib ([address] )   Assessment & Plan    · In the history of read was paroxysmal atrial fibrillation EKG shows she is in atrial fibrillation heart rate 109 slightly with rapid ventricular response restarted her metoprolol also give her a bolus of normal saline 500 mL  Check a TSH     · She will have a 2D echo bubble study done tomorrow her previous echo normal EF  LENARD on CPAP   Assessment & Plan    · Patient will bring her own CPAP Place [facility] order to ordered  VTE Prophylaxis: Rivaroxaban (Xarelto)  / sequential compression device   Code Status: full  POLST: There is no POLST form on file for this patient (pre-hospital)  Discussion with family: yes    Anticipated Length of Stay:  Patient will be admitted on an Inpatient basis with an anticipated length of stay of   2 midnights  Justification for Hospital Stay:  TIA and further CV workup    Total Time for Visit, including Counseling / Coordination of Care: 45 minutes    Greater than 50% of this total time spent on direct patient counseling and coordination of care     Chief Complaint:     I felt dizzy and had slurry speech for 40 min    History of Present Illness:    Shant Stovall is a 65 y o  female who presents with she is a  taking in-house presented with a slurry speech while answering the phone and also when she went to get a lottery she felt woozy and fell over to the cash register  All that resolved in 40 min  She has been taking her Xarelto she never had a history of TIA or stroke  Currently she denies any dizziness she denies any chest pain or shortness of breath nausea vomiting diarrhea abdominal pain  This all happened during lunchtime  She never experience feelings like that  Review of Systems:    Review of Systems   Constitutional: Negative for fatigue and fever  HENT: Negative  Negative for ear pain, rhinorrhea and sore throat  Eyes: Negative  Negative for pain and itching  Respiratory: Negative  Negative for cough, chest tightness, shortness of breath and wheezing  Cardiovascular: Negative  Negative for chest pain, palpitations and leg swelling  Gastrointestinal: Negative  Negative for abdominal pain, diarrhea, nausea and vomiting  Endocrine: Negative for polydipsia, polyphagia and polyuria  Genitourinary: Negative for dysuria and flank pain  Musculoskeletal: Negative  Negative for back pain and myalgias  Skin: Negative  Negative for rash and wound  Neurological: Positive for dizziness, speech difficulty, weakness and light-headedness  Negative for syncope, numbness and headaches  Psychiatric/Behavioral: Negative for agitation, confusion and decreased concentration  All other systems reviewed and are negative  Past Medical and Surgical History:     Past Medical History:   Diagnosis Date    Atrial fibrillation [facility])        Past Surgical History:   Procedure Laterality Date    BREAST SURGERY         Meds/Allergies:    Prior to Admission medications    Medication Sig Start Date End Date Taking? Authorizing Provider   metoprolol tartrate (LOPRESSOR) 25 mg tablet Take 25 mg by mouth 2 (two) times a day 6/5/17   Historical Provider, MD   rivaroxaban (XARELTO) 20 mg tablet Take 20 mg by mouth daily 6/5/17   Historical Provider, MD   Tetrahydrozoline HCl (EYE DROPS OP) Apply to eye    Historical Provider, MD   timolol (TIMOPTIC-XE) 0 5 % ophthalmic gel-forming  2/16/18   Historical Provider, MD   prednisoLONE acetate (PRED FORTE) 1 % ophthalmic suspension  2/15/18 7/24/18  Historical Provider, MD PATEL have reviewed home medications using allscripts  Allergies: No Known Allergies    Social History:     Marital Status:    Substance Use History:   History   Alcohol Use No     History   Smoking Status    Never Smoker   Smokeless Tobacco    Never Used     History   Drug Use No       Family History:    History reviewed  No pertinent family history  Physical Exam:     Vitals:   Blood Pressure: 136/76 (07/24/18 1342)  Pulse: (!) 118 (07/24/18 1342)  Temperature: 98 °F (36 7 °C) (07/24/18 1342)  Temp Source: Temporal (07/24/18 1342)  Respirations: 18 (07/24/18 1342)  Weight - Scale: 78 5 kg (173 lb) (07/24/18 1342)  SpO2: 96 % (07/24/18 1342)    Physical Exam   Constitutional: She is oriented to person, place, and time  She appears well-developed and well-nourished  HENT:   Head: Normocephalic and atraumatic  Eyes: EOM are normal  Pupils are equal, round, and reactive to light  Neck: Normal range of motion  Neck supple  Cardiovascular: Normal rate and normal heart sounds  An irregularly irregular rhythm present  Pulmonary/Chest: Effort normal and breath sounds normal    Abdominal: Soft  Bowel sounds are normal    Musculoskeletal: Normal range of motion  Neurological: She is alert and oriented to person, place, and time  She has normal reflexes  She is alert and oriented x3 cranial 2-12 intact DTRs +2 she has no a symmetric weakness no sensation deficiencies cerebellar intact her NIH is 0  Skin: Skin is warm  Psychiatric: She has a normal mood and affect  Additional Data:     Lab Results: I have personally reviewed pertinent films in PACS      Results from last 7 days  Lab Units 07/24/18  1406   WBC Thousand/uL 5 50   HEMOGLOBIN g/dL 13 1   HEMATOCRIT % 39 5   PLATELETS Thousands/uL 166       Results from last 7 days  Lab Units 07/24/18  1406   SODIUM mmol/L 142   POTASSIUM mmol/L 4 3   CHLORIDE mmol/L 106   CO2 mmol/L 30   BUN mg/dL 22   CREATININE mg/dL 1 00   CALCIUM mg/dL 9 0   TOTAL PROTEIN g/dL 6 6   BILIRUBIN TOTAL mg/dL 0 40   ALK PHOS U/L 83   ALT U/L 30   AST U/L 26   GLUCOSE RANDOM mg/dL 101*       Results from last 7 days  Lab Units 07/24/18  1406   INR  1 22*               Imaging: I have personally reviewed pertinent films in PACS    CTA head and neck with and without contrast   Final Result by Jose Chan MD (07/24 1610)      No acute intracranial disease  No large vessel flow restrictive disease within the head or neck  Workstation performed: [id number]         XR chest 2 views   ED Interpretation by Lester Alanis PA-C (07/24 1500)   No acute infiltrate or effusion, normal cardiac silhouette      Final Result by Estelle Brock MD (07/24 1457)      No acute cardiopulmonary disease  Stable exam      Workstation performed: [id number]             EKG, Pathology, and Other Studies Reviewed on Admission:   · EKG:  Atrial fibrillation with heart rate of 109    Allscripts / Epic Records Reviewed: Yes     ** Please Note: This note has been constructed using a voice recognition system   **

## 2018-07-24 NOTE — ASSESSMENT & PLAN NOTE
· Patient had transient neurological symptoms today with the dizziness slurry speech that lasted for 40 min and result suspect this is TI A  She is already on Xarelto with history of atrial fibrillation  He never had a history of stroke  ER over discussed with Neurology CTs were all normal CT of the brain was normal as well tomorrow receive MRI  · I started a statin and her lipid panel tomorrow will discuss with Neurology and along if needed with Dr Yolis Garcia her cardiologist in terms of of possible switching her to Coumadin    · 2D echo bubble study the rest is stroke pathway orders ordered as well

## 2018-07-24 NOTE — ASSESSMENT & PLAN NOTE
· In the history of read was paroxysmal atrial fibrillation EKG shows she is in atrial fibrillation heart rate 109 slightly with rapid ventricular response restarted her metoprolol also give her a bolus of normal saline 500 mL  Check a TSH     · She will have a 2D echo bubble study done tomorrow her previous echo normal EF

## 2018-07-24 NOTE — ED PROVIDER NOTES
History  Chief Complaint   Patient presents with    Dizziness     pt states she had a 40 minute episode of dizziness after taking a brief walk in the heat outside, reports feeling near-syncopal but denies losing consciousness, currently denies any symptoms, blood sugar 102 pta     79-year-old female with history atrial fibrillation the emergency department for evaluation brief, now resolved, episode of dizziness and slurred speech lasting approximately 40 minutes  Symptoms began approximately 1 hour prior to arrival, while she was walking outside  She denies syncope  She denies associated h/a, vision changes, facial or extremity paresthesias, CP, SOB, N/V, or difficulty ambulating  She has never experienced similar symptoms in the past  She has been compliant w/ her anticoagulants and her metoprolol  She denies any  Current symptoms at this time  Denies recent surgery, prolonged immobilization or prolonged travel  No OTC meds taken for symptoms today  Prior to Admission Medications   Prescriptions Last Dose Informant Patient Reported? Taking? Tetrahydrozoline HCl (EYE DROPS OP)   Yes No   Sig: Apply to eye   metoprolol tartrate (LOPRESSOR) 25 mg tablet  Self Yes No   Sig: Take 25 mg by mouth 2 (two) times a day   rivaroxaban (XARELTO) 20 mg tablet  Self Yes No   Sig: Take 20 mg by mouth daily   timolol (TIMOPTIC-XE) 0 5 % ophthalmic gel-forming  Self Yes No      Facility-Administered Medications: None       Past Medical History:   Diagnosis Date    Atrial fibrillation [facility])        Past Surgical History:   Procedure Laterality Date    BREAST SURGERY         History reviewed  No pertinent family history  I have reviewed and agree with the history as documented  Social History   Substance Use Topics    Smoking status: Never Smoker    Smokeless tobacco: Never Used    Alcohol use No        Review of Systems   Constitutional: Negative for chills, diaphoresis, fatigue and fever     Eyes: Negative for photophobia and visual disturbance  Respiratory: Negative for shortness of breath  Cardiovascular: Negative for chest pain  Gastrointestinal: Negative for nausea and vomiting  Musculoskeletal: Negative for back pain and neck pain  Skin: Negative for rash  Allergic/Immunologic: Negative for immunocompromised state  Neurological: Positive for dizziness (now resolved) and speech difficulty (slurred, now resolved)  Negative for syncope, weakness, light-headedness, numbness and headaches  Psychiatric/Behavioral: Negative for confusion and decreased concentration  The patient is not nervous/anxious  Physical Exam  Physical Exam   Constitutional: She is oriented to person, place, and time  She appears well-developed and well-nourished  No distress  HENT:   Head: Normocephalic and atraumatic  Mouth/Throat: Oropharynx is clear and moist  No oropharyngeal exudate  Eyes: EOM are normal  Pupils are equal, round, and reactive to light  No scleral icterus  Neck: No JVD present  Cardiovascular: Normal rate  An irregularly irregular rhythm present  Exam reveals no gallop and no friction rub  No murmur heard  Pulmonary/Chest: No respiratory distress  She has no wheezes  She has no rales  Abdominal: Soft  Bowel sounds are normal  She exhibits no distension and no mass  There is no tenderness  There is no guarding  Musculoskeletal: She exhibits edema (1+ bilateral lower extremities, non pitting)  Neurological: She is alert and oriented to person, place, and time  No cranial nerve deficit  Baseline R facial droop at rest, symmetric motor function  Cerebellar testing normal  Gait steady without instability or deviation   Skin: Skin is warm and dry  Capillary refill takes less than 2 seconds  She is not diaphoretic  Psychiatric: She has a normal mood and affect  Her behavior is normal    Vitals reviewed        Vital Signs  ED Triage Vitals [07/24/18 1342]   Temperature Pulse Respirations Blood Pressure SpO2   98 °F (36 7 °C) (!) 118 18 136/76 96 %      Temp Source Heart Rate Source Patient Position - Orthostatic VS BP Location FiO2 (%)   Temporal Monitor Lying Left arm --      Pain Score       No Pain           Vitals:    07/24/18 1342   BP: 136/76   Pulse: (!) 118   Patient Position - Orthostatic VS: Lying       Visual Acuity      ED Medications  Medications   sodium chloride 0 9 % bolus 500 mL (not administered)   iohexol (OMNIPAQUE) 350 MG/ML injection (MULTI-DOSE) 85 mL (85 mL Intravenous Given 7/24/18 8455)       Diagnostic Studies  Results Reviewed     Procedure Component Value Units Date/Time    Troponin I [[id number]]  (Normal) Collected:  07/24/18 1406    Lab Status:  Final result Specimen:  Blood from Arm, Right Updated:  07/24/18 1442     Troponin I <0 01 ng/mL     APTT [[id number]]  (Normal) Collected:  07/24/18 1406    Lab Status:  Final result Specimen:  Blood from Arm, Right Updated:  07/24/18 1431     PTT 33 seconds     Narrative:       PTT:      Protime-INR [[id number]]  (Abnormal) Collected:  07/24/18 1406    Lab Status:  Final result Specimen:  Blood from Arm, Right Updated:  07/24/18 1431     Protime 12 8 (H) seconds      INR 1 22 (H)    Narrative:       INR:  ,PROTIME:      CBC [[id number]]  (Abnormal) Collected:  07/24/18 1406    Lab Status:  Final result Specimen:  Blood from Arm, Right Updated:  07/24/18 1430     WBC 5 50 Thousand/uL      RBC 3 98 (L) Million/uL      Hemoglobin 13 1 g/dL      Hematocrit 39 5 %      MCV 99 fL      MCH 32 8 pg      MCHC 33 1 g/dL      RDW 13 2 %      Platelets 292 Thousands/uL      MPV 8 4 (L) fL     Comprehensive metabolic panel [[id number]]  (Abnormal) Collected:  07/24/18 1406    Lab Status:  Final result Specimen:  Blood from Arm, Right Updated:  07/24/18 1429     Sodium 142 mmol/L      Potassium 4 3 mmol/L      Chloride 106 mmol/L      CO2 30 mmol/L      Anion Gap 6 mmol/L      BUN 22 mg/dL      Creatinine 1 00 mg/dL      Glucose 101 (H) mg/dL      Calcium 9 0 mg/dL      AST 26 U/L      ALT 30 U/L      Alkaline Phosphatase 83 U/L      Total Protein 6 6 g/dL      Albumin 3 5 g/dL      Total Bilirubin 0 40 mg/dL      eGFR 54 (L) ml/min/1 73sq m     Narrative:         National Kidney Disease Education Program recommendations are as follows:  GFR calculation is accurate only with a steady state creatinine  Chronic Kidney disease less than 60 ml/min/1 73 sq  meters  Kidney failure less than 15 ml/min/1 73 sq  meters  CTA head and neck with and without contrast   Final Result by Aniyah Camarillo MD (07/24 1610)      No acute intracranial disease  No large vessel flow restrictive disease within the head or neck  Workstation performed: [id number]         XR chest 2 views   ED Interpretation by Jasiel Whaley PA-C (07/24 1500)   No acute infiltrate or effusion, normal cardiac silhouette      Final Result by Ted Alexander MD (07/24 1587)      No acute cardiopulmonary disease  Stable exam      Workstation performed: [id number]                    Procedures  ECG 12 Lead Documentation  Date/Time: 7/24/2018 2:15 PM  Performed by: Sally Altman by: Gretel Birmingham     Indications / Diagnosis:  Stroke symptoms  ECG reviewed by me, the ED Provider: yes    Patient location:  ED  Previous ECG:     Previous ECG:  Unavailable    Comparison to cardiac monitor: No    Interpretation:     Interpretation: abnormal    Rate:     ECG rate:  109    ECG rate assessment: normal    Rhythm:     Rhythm: atrial fibrillation    Ectopy:     Ectopy: none    QRS:     QRS axis:  Normal  Conduction:     Conduction: normal    ST segments:     ST segments:  Normal  T waves:     T waves: normal             Phone Contacts  ED Phone Contact    ED Course  ED Course as of Jul 24 1833 Tue Jul 24, 2018   1402 64 yo female w/ hx of Afib presents w/ now resolved dizziness and slurred speech   Symptoms began approx 1 hr PTA, resolved after 40 mins  Currently asymptomatic, neurologically non focal  VS unremarkable  EKG unremarkable  Plan for stroke/TIA workup, neurology consult, reassess  1619 Workup unremarkable  Pt remains neurologically intact  Spoke w/ Dr Matt Kraus, on call neurology, who agrees with medical admission, recommends ECHO w/ bubble study to r/o cardioembolic source  Will page [facility] admitting    200 Dr Shyann Carl accepts pt for admission                                MDM  Number of Diagnoses or Management Options  Other specified transient cerebral ischemias: new and requires workup  Diagnosis management comments: 64 yo female presents to the ED w/ TIA symptoms  She describes a 40 min episode of dizziness and slurred speech, resolved spontaneously prior to arrival  Upon arrival to the ED she is asymptomatic, neurologically non focal  Stroke/TIA workup unremarkable, CTA head/neck normal  Case d/w Dr Matt Kraus, on call for neurology, who agrees that pt should be admitted for further workup  Pt to be admitted to Dr Adriel Nick service  She remained asymptomatic and neurologically non focal throughout her ED stay          Amount and/or Complexity of Data Reviewed  Clinical lab tests: ordered and reviewed  Tests in the radiology section of CPT®: ordered and reviewed  Tests in the medicine section of CPT®: ordered and reviewed  Review and summarize past medical records: yes  Independent visualization of images, tracings, or specimens: yes      CritCare Time    Disposition  Final diagnoses:   Other specified transient cerebral ischemias     Time reflects when diagnosis was documented in both MDM as applicable and the Disposition within this note     Time User Action Codes Description Comment    7/24/2018  4:15 PM Arthur Pack Add [G45 8] Other specified transient cerebral ischemias     7/24/2018  6:29 PM Mariaa Sloan Add [G45 9] TIA (transient ischemic attack)     7/24/2018  6:32 PM Mariaa Sloan Add [E66 01,  Z68 35] Class 2 severe obesity due to excess calories with serious comorbidity and body mass index (BMI) of 35 0 to 35 9 in adult [facility])       ED Disposition     ED Disposition Condition Comment    Admit  Case was discussed with Dr Sesar Calvillo and the patient's admission status was agreed to be Admission Status: inpatient status to the service of Dr Sesar Calvillo   Follow-up Information    None         Patient's Medications   Discharge Prescriptions    No medications on file     No discharge procedures on file      ED Provider  Electronically Signed by           Stas Goodman PA-C  07/24/18 6458

## 2018-07-25 ENCOUNTER — APPOINTMENT (INPATIENT)
Dept: MRI IMAGING | Facility: HOSPITAL | Age: 78
DRG: 066 | End: 2018-07-25
Payer: COMMERCIAL

## 2018-07-25 ENCOUNTER — APPOINTMENT (INPATIENT)
Dept: NON INVASIVE DIAGNOSTICS | Facility: HOSPITAL | Age: 78
DRG: 066 | End: 2018-07-25
Payer: COMMERCIAL

## 2018-07-25 VITALS
DIASTOLIC BLOOD PRESSURE: 82 MMHG | OXYGEN SATURATION: 95 % | TEMPERATURE: 97.3 F | BODY MASS INDEX: 35.38 KG/M2 | HEART RATE: 99 BPM | WEIGHT: 175.49 LBS | SYSTOLIC BLOOD PRESSURE: 126 MMHG | RESPIRATION RATE: 16 BRPM | HEIGHT: 59 IN

## 2018-07-25 PROBLEM — I63.9 ACUTE EMBOLIC STROKE (HCC): Status: ACTIVE | Noted: 2018-07-24

## 2018-07-25 PROBLEM — R60.0 LOWER EXTREMITY EDEMA: Status: ACTIVE | Noted: 2018-07-25

## 2018-07-25 LAB
ANION GAP SERPL CALCULATED.3IONS-SCNC: 5 MMOL/L (ref 5–14)
BUN SERPL-MCNC: 16 MG/DL (ref 5–25)
CALCIUM SERPL-MCNC: 8.7 MG/DL (ref 8.4–10.2)
CHLORIDE SERPL-SCNC: 107 MMOL/L (ref 97–108)
CHOLEST SERPL-MCNC: 154 MG/DL
CO2 SERPL-SCNC: 29 MMOL/L (ref 22–30)
CREAT SERPL-MCNC: 0.81 MG/DL (ref 0.6–1.2)
ERYTHROCYTE [DISTWIDTH] IN BLOOD BY AUTOMATED COUNT: 13.1 %
EST. AVERAGE GLUCOSE BLD GHB EST-MCNC: 117 MG/DL
GFR SERPL CREATININE-BSD FRML MDRD: 70 ML/MIN/1.73SQ M
GLUCOSE SERPL-MCNC: 100 MG/DL (ref 70–99)
HBA1C MFR BLD: 5.7 % (ref 4.2–6.3)
HCT VFR BLD AUTO: 40.5 % (ref 36–46)
HDLC SERPL-MCNC: 52 MG/DL (ref 40–59)
HGB BLD-MCNC: 13.3 G/DL (ref 12–16)
INR PPP: 1.11 (ref 0.89–1.1)
LDLC SERPL CALC-MCNC: 90 MG/DL
MCH RBC QN AUTO: 32.7 PG (ref 26–34)
MCHC RBC AUTO-ENTMCNC: 32.9 G/DL (ref 31–36)
MCV RBC AUTO: 100 FL (ref 80–100)
PLATELET # BLD AUTO: 167 THOUSANDS/UL (ref 150–450)
PMV BLD AUTO: 8.7 FL (ref 8.9–12.7)
POTASSIUM SERPL-SCNC: 3.9 MMOL/L (ref 3.6–5)
PROTHROMBIN TIME: 11.7 SECONDS (ref 9.5–11.6)
RBC # BLD AUTO: 4.07 MILLION/UL (ref 4–5.2)
SODIUM SERPL-SCNC: 141 MMOL/L (ref 137–147)
TRIGL SERPL-MCNC: 60 MG/DL
WBC # BLD AUTO: 4.7 THOUSAND/UL (ref 4.5–11)

## 2018-07-25 PROCEDURE — 93005 ELECTROCARDIOGRAM TRACING: CPT

## 2018-07-25 PROCEDURE — 85027 COMPLETE CBC AUTOMATED: CPT | Performed by: FAMILY MEDICINE

## 2018-07-25 PROCEDURE — 93799 UNLISTED CV SVC/PROCEDURE: CPT

## 2018-07-25 PROCEDURE — 93321 DOPPLER ECHO F-UP/LMTD STD: CPT | Performed by: INTERNAL MEDICINE

## 2018-07-25 PROCEDURE — 70553 MRI BRAIN STEM W/O & W/DYE: CPT

## 2018-07-25 PROCEDURE — 80048 BASIC METABOLIC PNL TOTAL CA: CPT | Performed by: FAMILY MEDICINE

## 2018-07-25 PROCEDURE — 99239 HOSP IP/OBS DSCHRG MGMT >30: CPT | Performed by: FAMILY MEDICINE

## 2018-07-25 PROCEDURE — 99223 1ST HOSP IP/OBS HIGH 75: CPT | Performed by: PSYCHIATRY & NEUROLOGY

## 2018-07-25 PROCEDURE — 93308 TTE F-UP OR LMTD: CPT | Performed by: INTERNAL MEDICINE

## 2018-07-25 PROCEDURE — 83036 HEMOGLOBIN GLYCOSYLATED A1C: CPT | Performed by: FAMILY MEDICINE

## 2018-07-25 PROCEDURE — 93970 EXTREMITY STUDY: CPT

## 2018-07-25 PROCEDURE — 85610 PROTHROMBIN TIME: CPT | Performed by: INTERNAL MEDICINE

## 2018-07-25 PROCEDURE — 93306 TTE W/DOPPLER COMPLETE: CPT

## 2018-07-25 PROCEDURE — 93325 DOPPLER ECHO COLOR FLOW MAPG: CPT | Performed by: INTERNAL MEDICINE

## 2018-07-25 PROCEDURE — A9585 GADOBUTROL INJECTION: HCPCS | Performed by: FAMILY MEDICINE

## 2018-07-25 PROCEDURE — 80061 LIPID PANEL: CPT | Performed by: FAMILY MEDICINE

## 2018-07-25 PROCEDURE — 93970 EXTREMITY STUDY: CPT | Performed by: SURGERY

## 2018-07-25 RX ORDER — ATORVASTATIN CALCIUM 40 MG/1
40 TABLET, FILM COATED ORAL EVERY EVENING
Qty: 30 TABLET | Refills: 0 | Status: SHIPPED | OUTPATIENT
Start: 2018-07-25 | End: 2018-08-23 | Stop reason: SDUPTHER

## 2018-07-25 RX ORDER — FUROSEMIDE 10 MG/ML
40 INJECTION INTRAMUSCULAR; INTRAVENOUS ONCE
Status: COMPLETED | OUTPATIENT
Start: 2018-07-25 | End: 2018-07-25

## 2018-07-25 RX ORDER — FUROSEMIDE 20 MG/1
20 TABLET ORAL DAILY
Qty: 30 TABLET | Refills: 0 | Status: SHIPPED | OUTPATIENT
Start: 2018-07-26 | End: 2019-01-17 | Stop reason: SDUPTHER

## 2018-07-25 RX ORDER — ASPIRIN 81 MG/1
81 TABLET ORAL DAILY
Qty: 30 TABLET | Refills: 0 | Status: SHIPPED | OUTPATIENT
Start: 2018-07-26 | End: 2018-08-23 | Stop reason: SDUPTHER

## 2018-07-25 RX ORDER — ASPIRIN 81 MG/1
81 TABLET ORAL DAILY
Status: DISCONTINUED | OUTPATIENT
Start: 2018-07-25 | End: 2018-07-25 | Stop reason: HOSPADM

## 2018-07-25 RX ADMIN — METOPROLOL TARTRATE 25 MG: 25 TABLET, FILM COATED ORAL at 10:23

## 2018-07-25 RX ADMIN — SODIUM CHLORIDE 125 ML/HR: 9 INJECTION, SOLUTION INTRAVENOUS at 06:16

## 2018-07-25 RX ADMIN — GADOBUTROL 9 ML: 604.72 INJECTION INTRAVENOUS at 12:38

## 2018-07-25 RX ADMIN — FUROSEMIDE 40 MG: 10 INJECTION, SOLUTION INTRAVENOUS at 13:22

## 2018-07-25 RX ADMIN — ASPIRIN 81 MG: 81 TABLET, COATED ORAL at 13:22

## 2018-07-25 RX ADMIN — RIVAROXABAN 20 MG: 20 TABLET, FILM COATED ORAL at 10:24

## 2018-07-25 NOTE — ASSESSMENT & PLAN NOTE
·   Post review of the MRI showed acute small ischemic infarct and cerebellar suspect embolic discussed with Dr Reji Palmer and Dr Hank Rossi cardiology again will leave on Xarelto 20 mg daily aspirin 81 mg daily  As stated before her CT aids were negative echo was not resulted she can follow up as an outpatient for with Dr Turpin  for the results of echo the management for change I wanted to keep the patient overnight but the patient and is adamant and does not want to stay wants to go home I did explain to her of any new signs and symptoms of stroke if occurs to return to the ER  Also she is on aspirin and Xarelto 5 monitor for any bleeding    · Started lipitor 40 mg po daily- reviewed lipid panel   · 2D echo bubble study the rest is stroke pathway orders ordered as well

## 2018-07-25 NOTE — NURSING NOTE
Dr George Pelaez made aware of MAP at 2200 of 86, No new orders received  MAPs not to be maintained between 105 - 120 as per Dr George Pelaez  Will continue to monitor

## 2018-07-25 NOTE — CASE MANAGEMENT
Initial Clinical Review    Admission: Date/Time/Statement: 7/24/18 @ 1623     Orders Placed This Encounter   Procedures    Inpatient Admission (expected length of stay for this patient is greater than two midnights)     Standing Status:   Standing     Number of Occurrences:   1     Order Specific Question:   Admitting Physician     Answer:   Brittny Leonard [[id number]]     Order Specific Question:   Level of Care     Answer:   Level 1 Stepdown [13]     Order Specific Question:   Estimated length of stay     Answer:   More than 2 Midnights     Order Specific Question:   Certification     Answer:   I certify that inpatient services are medically necessary for this patient for a duration of greater than two midnights  See H&P and MD Progress Notes for additional information about the patient's course of treatment  ED: Date/Time/Mode of Arrival:   ED Arrival Information     Expected Arrival Acuity Means of Arrival Escorted By Service Admission Type    - 7/24/2018 13:21 Urgent Ambulance [facility] General Medicine Urgent    Arrival Complaint    Dizziness           Chief Complaint:   Chief Complaint   Patient presents with    Dizziness     pt states she had a 40 minute episode of dizziness after taking a brief walk in the heat outside, reports feeling near-syncopal but denies losing consciousness, currently denies any symptoms, blood sugar 102 pta       History of Illness: 65 y o  female who presents  with a slurry speech while answering the phone and also when she went to get a lottery she felt woozy and fell over to the cash register  All that resolved in 40 min  She has been taking her Xarelto she never had a history of TIA or stroke  CThis all happened during lunchtime        ED Vital Signs:   ED Triage Vitals [07/24/18 1342]   Temperature Pulse Respirations Blood Pressure SpO2   98 °F (36 7 °C) (!) 118 18 136/76 96 %      Temp Source Heart Rate Source Patient Position - Orthostatic VS BP Location FiO2 (%)   Temporal Monitor Lying Left arm --      Pain Score       No Pain        Wt Readings from Last 1 Encounters:   07/25/18 79 6 kg (175 lb 7 8 oz)       Vital Signs (abnormal): intermittent tachycardia    07/25/18 0400   96 8 °F (36 °C)   108  16  150/67  95  92 %  None (Room air)  Lying   07/25/18 0200   96 6 °F (35 9 °C)   107  20  151/98  116  92 %  None (Room air)  Lying   07/25/18 0000  97 5 °F (36 4 °C)  87  16  124/65  85  92 %  None (Room air)  Lying   07/24/18 2300   96 5 °F (35 8 °C)  97  16  134/92  106  92 %  None (Room air)  Lying   07/24/18 2204  --  101  --  127/66  --  --  --  --   07/24/18 2200   96 9 °F (36 1 °C)  101  18  127/66  86  92 %  None (Room air)  Lying   07/24/18 2105   97 1 °F (36 2 °C)   114  20  137/90  106  96 %  None (Room air)  Lying   07/24/18 1915  --   116  13  137/82  --  97 %  --  --   07/24/18 1342  98 °F (36 7 °C)   118  18  136/76  --  96 %  None (Room air)  Lying     Exam CV: An irregularly irregular rhythm  1+ bilateral lower extremity edema  Baseline right facial droop  Abnormal Labs/Diagnostic Test Results:   INR 1 22  Glucose 101  eGFR 54      cta head and neck - No acute intracranial disease     No large vessel flow restrictive disease within the head or neck    7/25/2018  INR 1 11  Glucose 100  MRI brain - pending  ED Treatment:   Medication Administration from 07/24/2018 1321 to 07/24/2018 2057       Date/Time Order Dose Route Action Comments     07/24/2018 1545 iohexol (OMNIPAQUE) 350 MG/ML injection (MULTI-DOSE) 85 mL 85 mL Intravenous Given           Past Medical/Surgical History:    Active Ambulatory Problems     Diagnosis Date Noted    Pulmonary HTN ([facility] ) 04/13/2018    Class 2 obesity with serious comorbidity and body mass index (BMI) of 35 0 to 35 9 in adult 04/13/2018    LENRAD on CPAP 04/13/2018    [facility] ([facility] ) 10/05/2017     Resolved Ambulatory Problems     Diagnosis Date Noted    No Resolved Ambulatory Problems     Past Medical History:   Diagnosis Date    Atrial fibrillation [facility])        Admitting Diagnosis: Other specified transient cerebral ischemias [G45 8]  TIA (transient ischemic attack) [G45 9]  Dizziness [R42]  Class 2 severe obesity due to excess calories with serious comorbidity and body mass index (BMI) of 35 0 to 35 9 in adult ([facility]) [E66 01, Z68 35]    Age/Sex: 65 y o  female    Assessment/Plan:   TIA (transient ischemic attack)   Assessment & Plan     · Patient had transient neurological symptoms today with the dizziness slurry speech that lasted for 40 min and result suspect this is TI A  She is already on Xarelto with history of atrial fibrillation  He never had a history of stroke  ER over discussed with Neurology CTs were all normal CT of the brain was normal as well tomorrow receive MRI  · I started a statin and her lipid panel tomorrow will discuss with Neurology and along if needed with Dr Marianna Pope her cardiologist in terms of of possible switching her to Coumadin  · 2D echo bubble study the rest is stroke pathway orders ordered as well          Chronic a-fib ([facility] )   Assessment & Plan     · In the history of read was paroxysmal atrial fibrillation EKG shows she is in atrial fibrillation heart rate 109 slightly with rapid ventricular response restarted her metoprolol also give her a bolus of normal saline 500 mL  Check a TSH     · She will have a 2D echo bubble study done tomorrow her previous echo normal EF           LENARD on CPAP   Assessment & Plan     · Patient will bring her own CPAP Place Nursing order to ordered             Admission Orders:  7/24/2018  1623 INPATIENT   Scheduled Meds:   Current Facility-Administered Medications:  atorvastatin 40 mg Oral QPM    metoprolol tartrate 25 mg Oral BID    rivaroxaban 20 mg Oral Daily    sodium chloride 125 mL/hr Intravenous Continuous Last Rate: 125 mL/hr (07/25/18 0616)     Continuous Infusions:   sodium chloride 125 mL/hr Last Rate: 125 mL/hr (07/25/18 6416)     PRN Meds:     Telemetry  Neuro check q 1h x 4 then q 2h x 4 then q 4h x 72h  Dysphagia assessment prior to diet - passed  scds  cpap  MRI brain   Consult physical medicine rehab; neurology  Echo  PT/OT/speech    Per PMR - TIA  Obesity  Atrial fibrillation  Plan:  Evaluation is ongoing for the possible  transient ischemic attack  The patient reports no the the residual deficit from the speech and weakness difficulties that she experienced yesterday  More specifically she denies any difficulty in chewing or swallowing or articulating words or performing activities with the upper lower limbs or standing or walking  Physical therapy occupational therapy evaluations are appropriate for further assessments of neuromuscular status and function  I do not believe that inpatient rehabilitation will be necessary  Regarding obesity, she does have the elevated BMI of 35  I discussed this with her  I explained that in addition to her intentions to walk regularly for exercise, she should include a reduction in calorie intake  I explained that this can be facilitated by eliminating between meal snacks and eating smaller portion sizes the, using smaller plates, and eating more slowly  She understands and agrees  Regarding atrial fibrillation, I reminded her that because of the atrial fibrillation, she has increased risk of stroke compared to the general population  I strongly advised her to follow up closely with her primary care physician and cardiologist for ongoing management and risk reduction

## 2018-07-25 NOTE — ASSESSMENT & PLAN NOTE
· Rate controled continue xarelto and bblocker, tsh normal    · She will have a 2D echo bubble study done tomorrow her previous echo normal EF -  Follow up on the official results of an echo with Dr Alba Ortega

## 2018-07-25 NOTE — ASSESSMENT & PLAN NOTE
· Dc fluids   · Give lasix 40 mg iv x1  · Check vde even though on xarelto   ·  patient had a 2D echo done in 2018 of May showed an ejection fraction normal little bit of weakness in the left ventricle normal right ventricular function I did not see any report of pulmonary hypertension there is no grade 1 or grade 2 grade 3 diastolic dysfunction  ·   Another echo being done but this 1 is a bubble study to see if also there is a p o  Full for any of TIA workup

## 2018-07-25 NOTE — SPEECH THERAPY NOTE
Consult received  Pt admitted c episode of dizziness and slurred speech lasting approximately 40 minutes  Symptoms began approximately 1 hour prior to arrival   Sxs now resolved  W/U for etiology underway  Pt passed RN Dysphagia Assessment prior to administration of po med/food/fluid ( I appreciate timely completion within 1st hr of admit by Jennifer Brito RN and repeat screening on 2nd shift by Meghan Graham)  Swallowing Evaluation by SLP does not appear indicated at this time  Plan:  Await Evaluation by Neurologist and  MRI & f/u if/as indicated (eg Speech/Cognitive/Language Evaluation)

## 2018-07-25 NOTE — PROGRESS NOTES
Received MRI resolved multiple cereberal embolic strokes discussed with cardiology Dr Katerina Nolen and Dr Belita Ganser agreed upon to continue Xarelto 20 mg daily and the addition of aspirin 81 mg daily  Will keep patient till tomorrow

## 2018-07-25 NOTE — PROGRESS NOTES
Progress Note - Amanuel Husain 1940, 65 y o  female MRN: [id number]    Unit/Bed#: 7T [facility] 704-02 Encounter: [id number]    Primary Care Provider: Marilu Rojas MD   Date and time admitted to hospital: 7/24/2018  1:41 PM        TIA (transient ischemic attack)   Assessment & Plan    · Patient had transient neurological symptoms today with the dizziness slurry speech that lasted for 40 min and result suspect this is TI A  She is already on Xarelto with history of atrial fibrillation  He never had a history of stroke  Ct and cta normal  Discussed with dr coats will add aspirin 81mg po daily to xarelto - discussed with her cardiologist dr Vonnie Davis as well he is in agreement  · Started lipitor 40 mg po daily- reviewed lipid panel   · 2D echo bubble study the rest is stroke pathway orders ordered as well        Chronic a-fib (HCC)   Assessment & Plan    · Rate controled continue xarelto and bblocker, tsh normal    · She will have a 2D echo bubble study done tomorrow her previous echo normal EF  Lower extremity edema   Assessment & Plan    · Dc fluids   · Give lasix 40 mg iv x1  · Check vde even though on xarelto   ·  patient had a 2D echo done in 2018 of May showed an ejection fraction normal little bit of weakness in the left ventricle normal right ventricular function I did not see any report of pulmonary hypertension there is no grade 1 or grade 2 grade 3 diastolic dysfunction  ·   Another echo being done but this 1 is a bubble study to see if also there is a p o  Full for any of TIA workup  LENARD on CPAP   Assessment & Plan    · Patient will bring her own CPAP Place Nursing order to ordered  VTE Pharmacologic Prophylaxis:   Pharmacologic: Rivaroxaban (Xarelto)  Mechanical VTE Prophylaxis in Place: Yes    Patient Centered Rounds: I have performed bedside rounds with nursing staff today      Discussions with Specialists or Other Care Team Provider: yes     Education and Discussions with Family / Patient: yes    Time Spent for Care: 45 minutes  More than 50% of total time spent on counseling and coordination of care as described above  Current Length of Stay: 1 day(s)    Current Patient Status: Inpatient   Certification Statement: The patient will continue to require additional inpatient hospital stay due to Evaluation by Neurology and MRI result possibility DC home today also awaiting venous duplex study  Discharge Plan:   Home    Code Status: Level 1 - Full Code      Subjective:    patient seen and examined denies any chest pain or shortness of breath no more recurrent episode hopefully will be going home today she wants to be in her own bed  Notice some leg swelling overnight  Objective:     Vitals:   Temp (24hrs), Av 9 °F (36 1 °C), Min:96 1 °F (35 6 °C), Max:98 °F (36 7 °C)    HR:  [] 60  Resp:  [13-20] 15  BP: (124-151)/(65-98) 144/74  SpO2:  [92 %-97 %] 95 %  Body mass index is 35 44 kg/m²  Input and Output Summary (last 24 hours): Intake/Output Summary (Last 24 hours) at 18 1311  Last data filed at 18 4989   Gross per 24 hour   Intake              480 ml   Output              600 ml   Net             -120 ml       Physical Exam:     Physical Exam   Constitutional: She is oriented to person, place, and time  She appears well-developed and well-nourished  HENT:   Head: Normocephalic and atraumatic  Eyes: EOM are normal  Pupils are equal, round, and reactive to light  Neck: Normal range of motion  Cardiovascular: Normal rate and normal heart sounds  An irregularly irregular rhythm present  Pulmonary/Chest: Effort normal and breath sounds normal    Abdominal: Soft  Bowel sounds are normal    Musculoskeletal: Normal range of motion  She exhibits edema (Plus two pitting edema of lower extremities slight shin tenderness  No erythema  Patient has her legs down  )  Neurological: She is alert and oriented to person, place, and time   She has normal reflexes  Skin: Skin is warm  Psychiatric: She has a normal mood and affect  Additional Data:     Labs:      Results from last 7 days  Lab Units 07/25/18  0448   WBC Thousand/uL 4 70   HEMOGLOBIN g/dL 13 3   HEMATOCRIT % 40 5   PLATELETS Thousands/uL 167       Results from last 7 days  Lab Units 07/25/18  0448 07/24/18  1406   SODIUM mmol/L 141 142   POTASSIUM mmol/L 3 9 4 3   CHLORIDE mmol/L 107 106   CO2 mmol/L 29 30   BUN mg/dL 16 22   CREATININE mg/dL 0 81 1 00   CALCIUM mg/dL 8 7 9 0   TOTAL PROTEIN g/dL  --  6 6   BILIRUBIN TOTAL mg/dL  --  0 40   ALK PHOS U/L  --  83   ALT U/L  --  30   AST U/L  --  26   GLUCOSE RANDOM mg/dL 100* 101*       Results from last 7 days  Lab Units 07/25/18  0555   INR  1 11*                 * I Have Reviewed All Lab Data Listed Above  * Additional Pertinent Lab Tests Reviewed: All Labs Within Last 24 Hours Reviewed    Imaging:    Imaging Reports Reviewed Today Include: yes  Imaging Personally Reviewed by Myself Includes:  none    Recent Cultures (last 7 days):           Last 24 Hours Medication List:     Current Facility-Administered Medications:  aspirin 81 mg Oral Daily Reza Alvarez MD   atorvastatin 40 mg Oral QPM Reza Alvarez MD   furosemide 40 mg Intravenous Once Reza Alvarez MD   metoprolol tartrate 25 mg Oral BID Reza Alvarez MD   rivaroxaban 20 mg Oral Daily Reza Alvarez MD        Today, Patient Was Seen By: Reza Alvarez MD    ** Please Note: Dictation voice to text software may have been used in the creation of this document   **

## 2018-07-25 NOTE — ASSESSMENT & PLAN NOTE
· Patient had transient neurological symptoms today with the dizziness slurry speech that lasted for 40 min and result suspect this is TI A  She is already on Xarelto with history of atrial fibrillation  He never had a history of stroke  Ct and cta normal  Discussed with dr coats will add aspirin 81mg po daily to xarelto - discussed with her cardiologist dr Parviz Hernandez as well he is in agreement    · Started lipitor 40 mg po daily- reviewed lipid panel   · 2D echo bubble study the rest is stroke pathway orders ordered as well

## 2018-07-25 NOTE — ASSESSMENT & PLAN NOTE
· Dc fluids   · Give lasix 40 mg iv x1  · Check vde even though on xarelto - negative  ·  patient had a 2D echo done in 2018 of May showed an ejection fraction normal little bit of weakness in the left ventricle normal right ventricular function I did not see any report of pulmonary hypertension there is no grade 1 or grade 2 grade 3 diastolic dysfunction  ·   Another echo being done but this 1 is a bubble study to see if also there is a p o  Follow up on the results as an outpatient  ·  will send home Lasix 20 mg p o  Daily starting on 07/25  She did receive IV dose  Also stated to her elevate legs

## 2018-07-25 NOTE — CONSULTS
Consultation - Neurology   Srinivas Walden 65 y o  female MRN: [id number]  Unit/Bed#: 7T [facility] 704-02 Encounter: [id number]      Assessment/Plan   Assessment:  1  Acute, small ischemic infarct cerebellar vermal area  Given location and absence of any significant findings on CTA, suspect this was of an embolic origin with attention to a possible cardiac source  2   Chronic atrial fibrillation  On Xarelto at the time of her above event  Plan:  1  Await results of follow-up limited echocardiogram which included a bubble study  2   After discussion with Cardiology, continue Xarelto and add a daily 81 mg aspirin  3   Initiate statin  Discussed with Dr Gordo Tamez  Neurology will remain available to advised  Reason for Consultation:   Dizziness and slurre speech  HPI:   Srinivas Walden is a 65 y o  right handed female who presents with reported dizziness and slurred speech  Patient relates that mid day yesterday, while at work, she decided at approximately noon to go out for a walk  She did so  She described the environment is very high and humid  She then began to experience her described dizziness  She did not describe true vertigo, but instead sense of lightheadedness and near syncope  She said for a bit and improved to some extent but her symptoms did not resolve  She did return to work as a    While sitting at [employer], she was again very lightheaded and began to have described slurring of speech  She described no attendant problem with fluency, however  On arrival of the EMS she was described as having an apparent appropriate blood pressure and pulse  Some question was raised as to a possible facial asymmetry  However, patient herself describes no attendant issues with visual loss, diplopia, facial paresthesia, or lateralized motor or sensory symptoms  Her symptomatic issues resolved over an interval of approximately 40-45 minutes      She denies any past history of similar symptomatic issues  She has no past history of stroke or documented TIA  She has no past history of seizure  Review of her initial head CT revealed no evident acute changes  The head and neck CTA done at that time demonstrated no significant abnormalities  However, today, her follow-up brain MRI was performed  That study does demonstrate avid some supporting an acute cerebral vascular ischemic event involving the cerebellar vermal area  Review of Systems   Constitutional: Negative  HENT: Negative  Eyes: Negative  Respiratory: Positive for shortness of breath (yesterday during episode)  Cardiovascular: Negative  Gastrointestinal: Negative  Endocrine: Negative  Genitourinary: Negative  Musculoskeletal: Negative  Skin: Negative for rash  Allergic/Immunologic: Positive for environmental allergies  Neurological:        As above  Hematological: Bruises/bleeds easily  Psychiatric/Behavioral: Negative  Historical Information   Past Medical History:   Diagnosis Date    Atrial fibrillation [facility])      Past Surgical History:   Procedure Laterality Date    BREAST SURGERY       Social History    History reviewed  No pertinent family history  No Known Allergies      PTA meds:   Prior to Admission Medications   Prescriptions Last Dose Informant Patient Reported? Taking? Tetrahydrozoline HCl (EYE DROPS OP)   Yes No   Sig: Apply to eye   metoprolol tartrate (LOPRESSOR) 25 mg tablet  Self Yes No   Sig: Take 25 mg by mouth 2 (two) times a day   montelukast (SINGULAIR) 10 mg tablet 7/23/18  Yes No   Sig: Take 10 mg by mouth every evening   rivaroxaban (XARELTO) 20 mg tablet  Self Yes No   Sig: Take 20 mg by mouth daily   timolol (TIMOPTIC-XE) 0 5 % ophthalmic gel-forming  Self Yes No      Facility-Administered Medications: None         Objective   Vitals:Blood pressure 144/74, pulse 60, temperature (!) 96 1 °F (35 6 °C), temperature source Temporal, resp   rate 15, height 4' 11" (1 499 m), weight 79 6 kg (175 lb 7 8 oz), SpO2 95 %, not currently breastfeeding  Physical Exam  Head normocephalic  Eyes nonicteric  Lungs clear to auscultation  Rhythm irregular  GI (abdomen) soft nontender with bowel sounds present  Lower extremities with bilateral edema  Neurologic Exam  Alert  Pleasantly and appropriately conversational   No significant dysarthria  No obvious symbolic language difficulties in general conversation  Gait independent with normal length strides and normal base  Romberg maneuver performed unremarkably  Cranial Nerves:   I: Olfactory sense intact bilaterally  II: Visual fields full to confrontation  Pupils equal, round, reactive to light with normal accomodation  Fundus: normal cup to disc ratio with no edema  III,IV,VI: Extraocular muscles EOMI, no nystagmus  V: [place] and pterygoid strength  Sensation in the V1 through V3 distributions intact to pinprick and light touch bilaterally  VII:  Left lower facial asymmetry  VIII: Audition intact to finger rub bilaterally  IX/X:  Subtle deviation of palate to left  XI: Trapezius and SCM strength 5/5 bilaterally  XII: Tongue midline with no atrophy or fasciculations  However, with modest deviation to right with protrusion  Coordination:  Accurate with finger-to-nose and heel-to-shin maneuvers bilaterally  Gait as above  Motor:  Essentially full symmetrical strength for age throughout the 4 extremities  Sensory testing:  Grossly intact to pin and position throughout  No extinction with double simultaneous stimulation  Muscle stretch reflexes:  Bilaterally 1 throughout the upper extremities and at the knees and bilaterally trace at the ankles  Toe responses:  Downgoing bilaterally        Lab Results:   CBC:   Results from last 7 days  Lab Units 07/25/18  0448 07/24/18  1406   WBC Thousand/uL 4 70 5 50   RBC Million/uL 4 07 3 98*   HEMOGLOBIN g/dL 13 3 13 1   HEMATOCRIT % 40 5 39 5   MCV fL 100 99   PLATELETS Thousands/uL 167 166   , BMP/CMP:   Results from last 7 days  Lab Units 07/25/18  0448 07/24/18  1406   SODIUM mmol/L 141 142   POTASSIUM mmol/L 3 9 4 3   CHLORIDE mmol/L 107 106   CO2 mmol/L 29 30   ANION GAP mmol/L 5 6   BUN mg/dL 16 22   CREATININE mg/dL 0 81 1 00   GLUCOSE RANDOM mg/dL 100* 101*   CALCIUM mg/dL 8 7 9 0   AST U/L  --  26   ALT U/L  --  30   ALK PHOS U/L  --  83   TOTAL PROTEIN g/dL  --  6 6   BILIRUBIN TOTAL mg/dL  --  0 40   EGFR ml/min/1 73sq m 70 54*   , TSH:   Results from last 7 days  Lab Units 07/24/18  1406   TSH 3RD GENERATON uIU/mL 1 560   , Lipid Profile:   Results from last 7 days  Lab Units 07/25/18  0448   HDL mg/dL 52   CHOLESTEROL mg/dL 154   LDL CALC mg/dL 90   TRIGLYCERIDES mg/dL 60     Imaging Studies: I have personally reviewed pertinent reports  and I have personally reviewed pertinent films in PACS    Counseling / Coordination of Care  I spent a total of 55 min with the patient with greater than 50% of that time spent counseling and coordinating her care, specifically discussing her diagnosis, additional tests, and discussing the case with her care team, as detailed above

## 2018-07-25 NOTE — CONSULTS
Consultation - Laurent Sosa 65 y o  female MRN: [id number]  Unit/Bed#: 7T [facility] 704-02 Encounter: [id number]    Assessment/Plan     Assessment:  TIA  Obesity  Atrial fibrillation  Plan:  Evaluation is ongoing for the possible  transient ischemic attack  The patient reports no the the residual deficit from the speech and weakness difficulties that she experienced yesterday  More specifically she denies any difficulty in chewing or swallowing or articulating words or performing activities with the upper lower limbs or standing or walking  Physical therapy occupational therapy evaluations are appropriate for further assessments of neuromuscular status and function  I do not believe that inpatient rehabilitation will be necessary  Regarding obesity, she does have the elevated BMI of 35  I discussed this with her  I explained that in addition to her intentions to walk regularly for exercise, she should include a reduction in calorie intake  I explained that this can be facilitated by eliminating between meal snacks and eating smaller portion sizes the, using smaller plates, and eating more slowly  She understands and agrees  Regarding atrial fibrillation, I reminded her that because of the atrial fibrillation, she has increased risk of stroke compared to the general population  I strongly advised her to follow up closely with her primary care physician and cardiologist for ongoing management and risk reduction  History of Present Illness   HPI:  Dixie Sherman is a 65 y o  female who presents with an experience of rapid onset of generalized weakness and slurred speech and difficulty walking yesterday  She denied any focal weakness but rather experienced global weakness and feeling lightheaded  There is no loss of consciousness or chest pain or shortness of breath or loss of continence  She did not fall down, but she did find it difficult to walk    She reports that the symptoms were resolved later yesterday and that today she does not feel any residual from yesterday's event  She is concerned about the cause and avoiding the possibility of it  Recurring  The duration of the difficulty of stay was approximately 1 hour with moderate severity, involving the entire body  Currently , she reports feeling well with no symptoms  She denies chest pain or shortness of breath or lightheadedness or dizziness or clumsiness or weakness or numbness or tingling  She denies any difficulty with chewing or swallowing or thought process or forming speech  Inpatient consult to Physical Medicine Rehab  Consult performed by: Jasmin Maria  Consult ordered by: Fouzia Miller          Review of Systems   Constitutional: Positive for activity change  Negative for chills and fever  HENT: Positive for voice change  Negative for drooling and trouble swallowing  Eyes: Negative for photophobia and visual disturbance  Respiratory: Negative for cough and shortness of breath  Cardiovascular: Positive for leg swelling  Negative for chest pain  Gastrointestinal: Negative for constipation and diarrhea  Endocrine: Negative for polyphagia and polyuria  Genitourinary: Negative for difficulty urinating and dysuria  Musculoskeletal: Negative for back pain and gait problem  Skin: Negative for pallor and rash  Neurological: Positive for dizziness and facial asymmetry  Negative for syncope, speech difficulty and weakness  Hematological: Does not bruise/bleed easily  Psychiatric/Behavioral: Negative for confusion         Historical Information   Past Medical History:   Diagnosis Date    Atrial fibrillation [facility])      Past Surgical History:   Procedure Laterality Date    BREAST SURGERY       Social History   History   Alcohol Use No     History   Drug Use No     History   Smoking Status    Never Smoker   Smokeless Tobacco    Never Used       Home Setup:  She lives with family in a single floor home  Functional Status Prior to Admission:  Independent in mobility and self-care  Functional Status on Admission:  Minimal assistance or supervision because of the weakness    History reviewed  No pertinent family history  Meds/Allergies   all current active meds have been reviewed  No Known Allergies    Objective   Vitals: Blood pressure 144/74, pulse 60, temperature (!) 96 1 °F (35 6 °C), temperature source Temporal, resp  rate 15, height 4' 11" (1 499 m), weight 79 6 kg (175 lb 7 8 oz), SpO2 95 %, not currently breastfeeding  Invasive Devices     Peripheral Intravenous Line            Peripheral IV 07/24/18 Left Forearm less than 1 day                Physical Exam   Constitutional: She is oriented to person, place, and time  She appears well-developed and well-nourished  HENT:   Head: Normocephalic and atraumatic  Eyes: Conjunctivae and EOM are normal    Neck: Normal range of motion  Neck supple  Cardiovascular: Normal rate and intact distal pulses  Pulmonary/Chest: Effort normal and breath sounds normal  No respiratory distress  Abdominal: Soft  Bowel sounds are normal    Musculoskeletal: Normal range of motion  She exhibits edema  Neurological: She is alert and oriented to person, place, and time  Cranial nerve and cognitive evaluations are unremarkable except for some drooping of the right eyelid which she states is of longstanding duration  There is also mild asymmetry in the N-L folds  Motor strength is generally preserved without focal deficit  No significant abnormal movement patterns such as tremors were noted  Sensation is present bilaterally for light touch and joint position sense  Coordination is adequate for finger to chin testing and for heel-to-shin testing except for some mild in accuracy with the left heel to the right shin  Muscle stretch reflexes are unremarkable  The toes are downgoing bilaterally  She is able to stand independently from the chair    She walks independently for a short distance in the room without loss of balance, using no upper limb assistive device  Skin: Skin is warm and dry  Psychiatric: Her behavior is normal    Nursing note and vitals reviewed  Lab Results: I have personally reviewed pertinent lab results  Imaging: I have personally reviewed pertinent films in PACS  EKG, Pathology, and Other Studies: I have personally reviewed pertinent reports  Code Status: Level 1 - Full Code  Advance Directive and Living Will:      Power of :    POLST:      Counseling / Coordination of Care  Total floor / unit time spent today 45 minutes  Greater than 50% of total time was spent with the patient and / or family counseling and / or coordination of care  A description of the counseling / coordination of care:  I explained that physical therapy and occupational therapy evaluation would be helpful in assessing for any deficits  I explained that workup for her episode yesterday is ongoing  Angela Mcclure

## 2018-07-25 NOTE — NURSING NOTE
IV removed and bleeding controlled  Telemetry monitor off  Patient discharged with her family  She has all of her belongings and prescriptions as well  She has her discharge instructions and left ambulating

## 2018-07-25 NOTE — PLAN OF CARE
Problem: Potential for Falls  Goal: Patient will remain free of falls  INTERVENTIONS:  - Assess patient frequently for physical needs  -  Identify cognitive and physical deficits and behaviors that affect risk of falls    -  [place] fall precautions as indicated by assessment   - Educate patient/family on patient safety including physical limitations  - Instruct patient to call for assistance with activity based on assessment  - Modify environment to reduce risk of injury  - Consider OT/PT consult to assist with strengthening/mobility   Outcome: Progressing      Problem: PAIN - ADULT  Goal: Verbalizes/displays adequate comfort level or baseline comfort level  Interventions:  - Encourage patient to monitor pain and request assistance  - Assess pain using appropriate pain scale  - Administer analgesics based on type and severity of pain and evaluate response  - Implement non-pharmacological measures as appropriate and evaluate response  - Consider cultural and social influences on pain and pain management  - Notify physician/advanced practitioner if interventions unsuccessful or patient reports new pain  Outcome: Progressing      Problem: SAFETY ADULT  Goal: Maintain or return to baseline ADL function  INTERVENTIONS:  -  Assess patient's ability to carry out ADLs; assess patient's baseline for ADL function and identify physical deficits which impact ability to perform ADLs (bathing, care of mouth/teeth, toileting, grooming, dressing, etc )  - Assess/evaluate cause of self-care deficits   - Assess range of motion  - Assess patient's mobility; develop plan if impaired  - Assess patient's need for assistive devices and provide as appropriate  - Encourage maximum independence but intervene and supervise when necessary  ¯ Involve family in performance of ADLs  ¯ Assess for home care needs following discharge   ¯ Request OT consult to assist with ADL evaluation and planning for discharge  ¯ Provide patient education as appropriate  Outcome: Progressing    Goal: Maintain or return mobility status to optimal level  INTERVENTIONS:  - Assess patient's baseline mobility status (ambulation, transfers, stairs, etc )    - Identify cognitive and physical deficits and behaviors that affect mobility  - Identify mobility aids required to assist with transfers and/or ambulation (gait belt, sit-to-stand, lift, walker, cane, etc )  - [place] fall precautions as indicated by assessment  - Record patient progress and toleration of activity level on Mobility SBAR; progress patient to next Phase/Stage  - Instruct patient to call for assistance with activity based on assessment  - Request Rehabilitation consult to assist with strengthening/weightbearing, etc   Outcome: Progressing      Problem: DISCHARGE PLANNING  Goal: Discharge to home or other facility with appropriate resources  INTERVENTIONS:  - Identify barriers to discharge w/patient and caregiver  - Arrange for needed discharge resources and transportation as appropriate  - Identify discharge learning needs (meds, wound care, etc )  - Arrange for interpretive services to assist at discharge as needed  - Refer to Case Management Department for coordinating discharge planning if the patient needs post-hospital services based on physician/advanced practitioner order or complex needs related to functional status, cognitive ability, or social support system  Outcome: Progressing      Problem: Knowledge Deficit  Goal: Patient/family/caregiver demonstrates understanding of disease process, treatment plan, medications, and discharge instructions  Complete learning assessment and assess knowledge base    Interventions:  - Provide teaching at level of understanding  - Provide teaching via preferred learning methods  Outcome: Progressing

## 2018-07-25 NOTE — PHYSICAL THERAPY NOTE
PT CANCELLATION    Received PT Evaluation and treat order  Chart reviewed  PT Evaluation/Treatment deferred at this time due to: patient requesting to go home and discharge orders already in  Per MD patient ambulating ad claire in room  D/C PT order      Kassie Salazar, PT, DPT

## 2018-07-26 LAB
ATRIAL RATE: 104 BPM
ATRIAL RATE: 107 BPM
QRS AXIS: 13 DEGREES
QRS AXIS: 22 DEGREES
QRSD INTERVAL: 72 MS
QRSD INTERVAL: 76 MS
QT INTERVAL: 320 MS
QT INTERVAL: 354 MS
QTC INTERVAL: 430 MS
QTC INTERVAL: 459 MS
T WAVE AXIS: 32 DEGREES
T WAVE AXIS: 45 DEGREES
VENTRICULAR RATE: 101 BPM
VENTRICULAR RATE: 109 BPM

## 2018-07-26 PROCEDURE — 93010 ELECTROCARDIOGRAM REPORT: CPT | Performed by: INTERNAL MEDICINE

## 2018-08-02 ENCOUNTER — OFFICE VISIT (OUTPATIENT)
Dept: CARDIOLOGY CLINIC | Facility: CLINIC | Age: 78
End: 2018-08-02
Payer: COMMERCIAL

## 2018-08-02 VITALS
BODY MASS INDEX: 32.32 KG/M2 | WEIGHT: 160 LBS | HEART RATE: 91 BPM | OXYGEN SATURATION: 97 % | SYSTOLIC BLOOD PRESSURE: 110 MMHG | DIASTOLIC BLOOD PRESSURE: 60 MMHG

## 2018-08-02 DIAGNOSIS — I27.20 PULMONARY HTN (HCC): ICD-10-CM

## 2018-08-02 DIAGNOSIS — G47.33 OSA ON CPAP: ICD-10-CM

## 2018-08-02 DIAGNOSIS — Z99.89 OSA ON CPAP: ICD-10-CM

## 2018-08-02 DIAGNOSIS — I48.19 PERSISTENT ATRIAL FIBRILLATION (HCC): Primary | ICD-10-CM

## 2018-08-02 PROCEDURE — 99214 OFFICE O/P EST MOD 30 MIN: CPT | Performed by: INTERNAL MEDICINE

## 2018-08-02 NOTE — PROGRESS NOTES
Cardiology Outpatient Progress Note - Abhilash Mujica 65 y o  female MRN: [id number]    @ Encounter: [id number]      Patient Active Problem List    Diagnosis Date Noted    Lower extremity edema 71/93/9928    Acute embolic stroke ([place] [address] ) 64/89/9396    Chronic a-fib ([place] [address] ) 07/24/2018    Pulmonary HTN ([place] [address] ) 04/13/2018    Class 2 obesity with serious comorbidity and body mass index (BMI) of 35 0 to 35 9 in adult 04/13/2018    LENARD on CPAP 04/13/2018    PAF (paroxysmal atrial fibrillation) ([place] [address] ) 10/05/2017       Assessment:  #  Chronic Afib- [id number] of 4, had small CVA on Xarelto so now ASA 81 mg added  metoprolol tartrate to 25 mg BID  Echo 5/7/18: EF: 55-60%, PASP was ok  Echo 6/6/17: EF: 65% PASP 55 mmHg  # CVA- MRI brain 7/25/18: small infarcts with superior aspect of the cerebellar vermis  # Pulmonary venous HTN- PASP 55 mmHg on echo, normal RV function, no symptoms  Normal RV size and function  # Obesity  # Severe LENARD on home sleep study and lowest O2 sat 68%,   Polysomnogram 8/19/17: lowest O2 sat 63%, 48 min under 90%- fitted for nasal CPAP  # TIA  Bubble study 7/25 negative       TODAY'S PLAN:  Continue asa 81 mg with Xarelto 20 mg     HPI:   66 yo female presents for first evaluation of new afib  Went to primary doctor for physical and EKG done showing afib with rate 140 bpm  She does not know how long she has been in it  NO chest pain or dyspnea  No edema  She was started on metoprolol and xarelto appropriately  No history of thyroid dysfunction  She does snore per her family  Holter done June 19 and not in afib  Echo done and EF: 65% with grade 2 DD and PASP 55 mmHg  Home sleep study revealed severe LENARD with lowest sat 68%, had 30 respiratory events  PSG, 8/19/17: lowest O2 sat 63%, 48 min under 90%- fitted for nasal CPAP  Interval History:   Following up after hospitalization a few weeks ago for acute, small ischemic infarct cerebellar vermal area  Suspected to be embolic, pt on Xarelto   Bubble study on echo done and negative for PFO  Asa 81 mg daily added to regimen  She has no permanent defecits    Past Medical History:   Diagnosis Date    Atrial fibrillation ([address] )        Review of Systems - positive for CVA she suffered on Xarelto  Presented with dizziness and slurred speech  No Known Allergies    Current Outpatient Prescriptions:     aspirin (ECOTRIN LOW STRENGTH) 81 mg EC tablet, Take 1 tablet (81 mg total) by mouth daily, Disp: 30 tablet, Rfl: 0    atorvastatin (LIPITOR) 40 mg tablet, Take 1 tablet (40 mg total) by mouth every evening, Disp: 30 tablet, Rfl: 0    furosemide (LASIX) 20 mg tablet, Take 1 tablet (20 mg total) by mouth daily, Disp: 30 tablet, Rfl: 0    metoprolol tartrate (LOPRESSOR) 25 mg tablet, Take 25 mg by mouth 2 (two) times a day, Disp: , Rfl:     montelukast (SINGULAIR) 10 mg tablet, Take 10 mg by mouth every evening, Disp: , Rfl: 0    rivaroxaban (XARELTO) 20 mg tablet, Take 20 mg by mouth daily, Disp: , Rfl:     Tetrahydrozoline HCl (EYE DROPS OP), Apply to eye, Disp: , Rfl:     timolol (TIMOPTIC-XE) 0 5 % ophthalmic gel-forming, , Disp: , Rfl:     Social History     Social History    Marital status:      Spouse name: N/A    Number of children: N/A    Years of education: N/A     Occupational History    Not on file  Social History Main Topics    Smoking status: Never Smoker    Smokeless tobacco: Never Used    Alcohol use No    Drug use: No    Sexual activity: Not on file     Other Topics Concern    Not on file     Social History Narrative    No narrative on file       No family history on file      Physical Exam:    Vitals: Blood pressure 110/60, pulse 91, weight 72 6 kg (160 lb), SpO2 97 %, not currently breastfeeding , Body mass index is 32 32 kg/m² ,   Wt Readings from Last 3 Encounters:   08/02/18 72 6 kg (160 lb)   07/25/18 79 6 kg (175 lb 7 8 oz)   06/18/18 79 1 kg (174 lb 6 4 oz)         Physical Exam:    GEN: Wilbert Yates appears well, alert and oriented x 3, pleasant and cooperative   HEENT: pupils equal, round, and reactive to light; extraocular muscles intact  NECK: supple, no carotid bruits   HEART: regular rhythm, normal S1 and S2, no murmurs, clicks, gallops or rubs, JVP is  down  LUNGS: clear to auscultation bilaterally; no wheezes, rales, or rhonchi   ABDOMEN: normal bowel sounds, soft, no tenderness, no distention  EXTREMITIES: peripheral pulses normal; no clubbing, cyanosis, or edema  NEURO: no focal findings   SKIN: normal without suspicious lesions on exposed skin    Labs & Results:    Lab Results   Component Value Date    GLUCOSE 100 (H) 07/25/2018    CALCIUM 8 7 07/25/2018     07/25/2018    K 3 9 07/25/2018    CO2 29 07/25/2018     07/25/2018    BUN 16 07/25/2018    CREATININE 0 81 07/25/2018     Lab Results   Component Value Date    WBC 4 70 07/25/2018    HGB 13 3 07/25/2018    HCT 40 5 07/25/2018     07/25/2018     07/25/2018       EKG personally reviewed by Chun Ford DO  Counseling / Coordination of Care  Total floor / unit time spent today 25 minutes  Greater than 50% of total time was spent with the patient and / or family counseling and / or coordination of care  A description of the counseling / coordination of care: 15  Thank you for the opportunity to participate in the care of this patient    DARINEL VOGT 29 [facility]

## 2018-08-02 NOTE — PROGRESS NOTES
To whom it may concern,      [facility] is my patient in the cardiology office  She is cleared to return to work with no restrictions  If you have any questions or concerns please contact me at [phone]      DARINEL VOGT 29 Dora Fowler

## 2018-08-23 ENCOUNTER — OFFICE VISIT (OUTPATIENT)
Dept: NEUROLOGY | Facility: CLINIC | Age: 78
End: 2018-08-23
Payer: COMMERCIAL

## 2018-08-23 VITALS
RESPIRATION RATE: 14 BRPM | HEART RATE: 102 BPM | SYSTOLIC BLOOD PRESSURE: 104 MMHG | DIASTOLIC BLOOD PRESSURE: 70 MMHG | BODY MASS INDEX: 33.34 KG/M2 | WEIGHT: 165.4 LBS | HEIGHT: 59 IN

## 2018-08-23 DIAGNOSIS — I63.9 ACUTE EMBOLIC STROKE (HCC): ICD-10-CM

## 2018-08-23 DIAGNOSIS — I48.20 CHRONIC A-FIB (HCC): Primary | ICD-10-CM

## 2018-08-23 PROCEDURE — 99203 OFFICE O/P NEW LOW 30 MIN: CPT | Performed by: PHYSICIAN ASSISTANT

## 2018-08-23 RX ORDER — ATORVASTATIN CALCIUM 40 MG/1
40 TABLET, FILM COATED ORAL EVERY EVENING
Qty: 30 TABLET | Refills: 11 | Status: SHIPPED | OUTPATIENT
Start: 2018-08-23

## 2018-08-23 RX ORDER — ASPIRIN 81 MG/1
81 TABLET ORAL DAILY
Qty: 30 TABLET | Refills: 11 | Status: SHIPPED | OUTPATIENT
Start: 2018-08-23

## 2018-08-23 NOTE — LETTER
August 23, 2018     Audrey Duarte MD  [facility]  [address]    Patient: Pratima Clifford   YOB: 1940   Date of Visit: 8/23/2018       Dear Dr Dayron Pereira:    Thank you for referring Ruperto Haji to me for evaluation  Below are my notes for this consultation  If you have questions, please do not hesitate to call me  I look forward to following your patient along with you  Sincerely,        Lindsay Jhaveri PA-C        CC: DO Lindsay Peterson PA-C  8/23/2018 10:30 AM  Sign at close encounter  Patient ID: Pratima Clifford is a 77 y o  female  Assessment/Plan:    Acute embolic stroke ([facility] [address] )  MRI brain demonstrated small acute vermal infarct on 07/24/2018, which appeared to be embolic  Aspirin 81 mg was added to her daily regimen and she was advised to continue her Xarelto as per Cardiology  She was also prescribed atorvastatin 40 mg daily  Fortunately, she has no residual deficit and has had no new symptoms to suggest additional cerebrovascular ischemic event since discharge from hospital   --continue Xarelto, 81 mg aspirin daily, and atorvastatin 40 mg daily  --she was advised to go to the emergency room should she have any sensory, motor, or other new symptoms suggesting possible stroke  Chronic a-fib ([facility] [address] )  Followed by Dr Angeal Foster  Continue Xarelto and aspirin as outlined above  She will follow up in 6 months  I spent a total of 30 min with the patient and her family with greater than 50% of that time spent counseling and coordinating her care, specifically discussing her diagnosis and additional tests as detailed above  Subjective:    HPI  The patient is a right-handed 76-year-old female who presents to the office for hospital follow-up regarding a small acute cerebellar infarct (vermal area) which prompted admission to [address] [place] [address] on 07/24/2018  She was accompanied today by her granddaughter      She is on Xarelto for her persistent atrial fibrillation and was compliant in taking the medication prior to the stroke  She would had been experiencing intense lightheadedness, near-syncope, and slurred speech on the day that she presented to [address] [place] [address] Heart Emergency Department  Her symptoms resolved over a period of approximately 45 min  She had no prior history of stroke, TIA, or seizure  81 mg aspirin and atorvastatin 40 mg was added to her medication regimen upon hospitalization and discharge  She has had a follow-up with her cardiologist, Dr Arcenio Cornejo, who agreed with the addition of aspirin to her Xarelto  She has had no other described symptoms to suggest new cerebrovascular ischemic events since her discharge from the hospital   She has had no residual deficit  Her daughter pulled me aside during the visit and stated that there was a short interval of confusion a few weeks ago while the [place] was at the beach  The patient does not corroborate this  Past Medical History:   Diagnosis Date    Atrial fibrillation [facility])      Past Surgical History:   Procedure Laterality Date    BREAST SURGERY       Social History     Social History    Marital status:       Spouse name: N/A    Number of children: N/A    Years of education: N/A     Social History Main Topics    Smoking status: Never Smoker    Smokeless tobacco: Never Used    Alcohol use No    Drug use: No    Sexual activity: Not Asked     Other Topics Concern    None     Social History Narrative    None     Family History   Problem Relation Age of Onset    Parkinsonism Brother     Dementia Brother      No Known Allergies    Current Outpatient Prescriptions:     aspirin (ECOTRIN LOW STRENGTH) 81 mg EC tablet, Take 1 tablet (81 mg total) by mouth daily, Disp: 30 tablet, Rfl: 11    atorvastatin (LIPITOR) 40 mg tablet, Take 1 tablet (40 mg total) by mouth every evening, Disp: 30 tablet, Rfl: 11    furosemide (LASIX) 20 mg tablet, Take 1 tablet (20 mg total) by mouth daily, Disp: 30 tablet, Rfl: 0    metoprolol tartrate (LOPRESSOR) 25 mg tablet, Take 25 mg by mouth 2 (two) times a day, Disp: , Rfl:     montelukast (SINGULAIR) 10 mg tablet, Take 10 mg by mouth every evening, Disp: , Rfl: 0    rivaroxaban (XARELTO) 20 mg tablet, Take 20 mg by mouth daily, Disp: , Rfl:     Tetrahydrozoline HCl (EYE DROPS OP), Apply to eye, Disp: , Rfl:     timolol (TIMOPTIC-XE) 0 5 % ophthalmic gel-forming, , Disp: , Rfl:       Objective:    Blood pressure 104/70, pulse 102, resp  rate 14, height 4' 11" (1 499 m), weight 75 kg (165 lb 6 4 oz), not currently breastfeeding  Physical Exam  Patient is overweight and appears stated age and is in no acute distress  HEENT:  Head normocephalic  Eyes anicteric  Heart:  With irregularly irregular rhythm  Lungs:  Clear to auscultation  Extremities:  With mild bilateral lower extremity edema  Neurological Exam  Mental Status:  Patient was alert, pleasantly interactive, and appropriately conversational   No obvious symbolic language difficulty or dysarthria, and the patient was fully oriented  Unremarkable spontaneous gait  Cranial Nerves:   I: Olfactory sense intact bilaterally  II: Visual fields full to confrontation  Pupils equal, round, reactive to light with normal accomodation  Fundus: normal cup to disc ratio with no edema  III,IV,VI: extraocular muscles EOMI, no nystagmus  V: Sensation in the V1 through V3 distributions intact to pinprick and light touch bilaterally  VII:  Unchanged left facial asymmetry noted  VIII: Audition intact to finger rub bilaterally  IX/X: Uvula midline  Soft palate elevation symmetric  XII: Tongue midline with no atrophy or fasciculations with appropriate movement  Coordination:  Romberg was negative and patient able to perform normal finger-to-nose and heel-to-shin without ataxia       There was no rest tremor, and tone was normal in the bilateral wrists  Motor testing with full symmetrical strength throughout the 4 extremities with no upper extremity drift  Sensory testing grossly intact to pin and position throughout  Reflexes were 1 throughout the upper extremities and at the knees and bilaterally trace at the ankles  Toe responses were downgoing bilaterally  ROS:    Review of Systems   Constitutional: Negative  Negative for appetite change and fever  HENT: Negative  Negative for hearing loss, tinnitus, trouble swallowing and voice change  Eyes: Negative  Negative for photophobia and pain  Respiratory: Positive for shortness of breath  Cardiovascular: Negative  Negative for palpitations  Gastrointestinal: Negative  Negative for nausea and vomiting  Endocrine: Negative  Negative for cold intolerance and heat intolerance  Genitourinary: Negative  Negative for dysuria, frequency and urgency  Musculoskeletal: Negative  Negative for myalgias and neck pain  Skin: Negative  Negative for rash  Neurological: Negative  Negative for dizziness, tremors, seizures, syncope, facial asymmetry, speech difficulty, weakness, light-headedness, numbness and headaches  Hematological: Negative  Does not bruise/bleed easily  Psychiatric/Behavioral: Negative  Negative for confusion, hallucinations and sleep disturbance  I personally reviewed the ROS that was entered by the medical assistant

## 2018-08-23 NOTE — ASSESSMENT & PLAN NOTE
MRI brain demonstrated small acute vermal infarct on 07/24/2018, which appeared to be embolic  Aspirin 81 mg was added to her daily regimen and she was advised to continue her Xarelto as per Cardiology  She was also prescribed atorvastatin 40 mg daily  Fortunately, she has no residual deficit and has had no new symptoms to suggest additional cerebrovascular ischemic event since discharge from hospital   --continue Xarelto, 81 mg aspirin daily, and atorvastatin 40 mg daily  --she was advised to go to the emergency room should she have any sensory, motor, or other new symptoms suggesting possible stroke

## 2018-08-23 NOTE — PROGRESS NOTES
Patient ID: Roberta Carver is a 77 y o  female  Assessment/Plan:    Acute embolic stroke ([facility] [address] )  MRI brain demonstrated small acute vermal infarct on 07/24/2018, which appeared to be embolic  Aspirin 81 mg was added to her daily regimen and she was advised to continue her Xarelto as per Cardiology  She was also prescribed atorvastatin 40 mg daily  Fortunately, she has no residual deficit and has had no new symptoms to suggest additional cerebrovascular ischemic event since discharge from hospital   --continue Xarelto, 81 mg aspirin daily, and atorvastatin 40 mg daily  --she was advised to go to the emergency room should she have any sensory, motor, or other new symptoms suggesting possible stroke  Chronic a-fib ([facility] [address] )  Followed by Dr Desiree Masters  Continue Xarelto and aspirin as outlined above  She will follow up in 6 months  I spent a total of 30 min with the patient and her family with greater than 50% of that time spent counseling and coordinating her care, specifically discussing her diagnosis and additional tests as detailed above  Subjective:    HPI  The patient is a right-handed 76-year-old female who presents to the office for hospital follow-up regarding a small acute cerebellar infarct (vermal area) which prompted admission to [address] [place] [address] on 07/24/2018  She was accompanied today by her granddaughter  She is on Xarelto for her persistent atrial fibrillation and was compliant in taking the medication prior to the stroke  She would had been experiencing intense lightheadedness, near-syncope, and slurred speech on the day that she presented to [address] [place] [address] Heart Emergency Department  Her symptoms resolved over a period of approximately 45 min  She had no prior history of stroke, TIA, or seizure  81 mg aspirin and atorvastatin 40 mg was added to her medication regimen upon hospitalization and discharge   She has had a follow-up with her cardiologist,   Shahram Gentleman, who agreed with the addition of aspirin to her Xarelto  She has had no other described symptoms to suggest new cerebrovascular ischemic events since her discharge from the hospital   She has had no residual deficit  Her daughter pulled me aside during the visit and stated that there was a short interval of confusion a few weeks ago while the family was at the beach  The patient does not corroborate this  Past Medical History:   Diagnosis Date    Atrial fibrillation [facility])      Past Surgical History:   Procedure Laterality Date    BREAST SURGERY       Social History     Social History    Marital status:      Spouse name: N/A    Number of children: N/A    Years of education: N/A     Social History Main Topics    Smoking status: Never Smoker    Smokeless tobacco: Never Used    Alcohol use No    Drug use: No    Sexual activity: Not Asked     Other Topics Concern    None     Social History Narrative    None     Family History   Problem Relation Age of Onset    Parkinsonism Brother     Dementia Brother      No Known Allergies    Current Outpatient Prescriptions:     aspirin (ECOTRIN LOW STRENGTH) 81 mg EC tablet, Take 1 tablet (81 mg total) by mouth daily, Disp: 30 tablet, Rfl: 11    atorvastatin (LIPITOR) 40 mg tablet, Take 1 tablet (40 mg total) by mouth every evening, Disp: 30 tablet, Rfl: 11    furosemide (LASIX) 20 mg tablet, Take 1 tablet (20 mg total) by mouth daily, Disp: 30 tablet, Rfl: 0    metoprolol tartrate (LOPRESSOR) 25 mg tablet, Take 25 mg by mouth 2 (two) times a day, Disp: , Rfl:     montelukast (SINGULAIR) 10 mg tablet, Take 10 mg by mouth every evening, Disp: , Rfl: 0    rivaroxaban (XARELTO) 20 mg tablet, Take 20 mg by mouth daily, Disp: , Rfl:     Tetrahydrozoline HCl (EYE DROPS OP), Apply to eye, Disp: , Rfl:     timolol (TIMOPTIC-XE) 0 5 % ophthalmic gel-forming, , Disp: , Rfl:       Objective:    Blood pressure 104/70, pulse 102, resp   rate 14, height 4' 11" (1 499 m), weight 75 kg (165 lb 6 4 oz), not currently breastfeeding  Physical Exam  Patient is overweight and appears stated age and is in no acute distress  HEENT:  Head normocephalic  Eyes anicteric  Heart:  With irregularly irregular rhythm  Lungs:  Clear to auscultation  Extremities:  With mild bilateral lower extremity edema  Neurological Exam  Mental Status:  Patient was alert, pleasantly interactive, and appropriately conversational   No obvious symbolic language difficulty or dysarthria, and the patient was fully oriented  Unremarkable spontaneous gait  Cranial Nerves:   I: Olfactory sense intact bilaterally  II: Visual fields full to confrontation  Pupils equal, round, reactive to light with normal accomodation  Fundus: normal cup to disc ratio with no edema  III,IV,VI: extraocular muscles EOMI, no nystagmus  V: Sensation in the V1 through V3 distributions intact to pinprick and light touch bilaterally  VII:  Unchanged left facial asymmetry noted  VIII: Audition intact to finger rub bilaterally  IX/X: Uvula midline  Soft palate elevation symmetric  XII: Tongue midline with no atrophy or fasciculations with appropriate movement  Coordination:  Romberg was negative and patient able to perform normal finger-to-nose and heel-to-shin without ataxia  There was no rest tremor, and tone was normal in the bilateral wrists  Motor testing with full symmetrical strength throughout the 4 extremities with no upper extremity drift  Sensory testing grossly intact to pin and position throughout  Reflexes were 1 throughout the upper extremities and at the knees and bilaterally trace at the ankles  Toe responses were downgoing bilaterally  ROS:    Review of Systems   Constitutional: Negative  Negative for appetite change and fever  HENT: Negative  Negative for hearing loss, tinnitus, trouble swallowing and voice change  Eyes: Negative    Negative for photophobia and pain  Respiratory: Positive for shortness of breath  Cardiovascular: Negative  Negative for palpitations  Gastrointestinal: Negative  Negative for nausea and vomiting  Endocrine: Negative  Negative for cold intolerance and heat intolerance  Genitourinary: Negative  Negative for dysuria, frequency and urgency  Musculoskeletal: Negative  Negative for myalgias and neck pain  Skin: Negative  Negative for rash  Neurological: Negative  Negative for dizziness, tremors, seizures, syncope, facial asymmetry, speech difficulty, weakness, light-headedness, numbness and headaches  Hematological: Negative  Does not bruise/bleed easily  Psychiatric/Behavioral: Negative  Negative for confusion, hallucinations and sleep disturbance  I personally reviewed the ROS that was entered by the medical assistant

## 2018-12-27 ENCOUNTER — OFFICE VISIT (OUTPATIENT)
Dept: SLEEP CENTER | Facility: CLINIC | Age: 78
End: 2018-12-27
Payer: COMMERCIAL

## 2018-12-27 VITALS
DIASTOLIC BLOOD PRESSURE: 66 MMHG | WEIGHT: 160 LBS | HEIGHT: 59 IN | SYSTOLIC BLOOD PRESSURE: 110 MMHG | BODY MASS INDEX: 32.25 KG/M2 | HEART RATE: 64 BPM

## 2018-12-27 DIAGNOSIS — Z99.89 OSA ON CPAP: Primary | ICD-10-CM

## 2018-12-27 DIAGNOSIS — I48.0 PAF (PAROXYSMAL ATRIAL FIBRILLATION) (HCC): ICD-10-CM

## 2018-12-27 DIAGNOSIS — G47.33 OSA ON CPAP: Primary | ICD-10-CM

## 2018-12-27 DIAGNOSIS — R60.0 LOWER EXTREMITY EDEMA: ICD-10-CM

## 2018-12-27 DIAGNOSIS — I63.9 ACUTE EMBOLIC STROKE (HCC): ICD-10-CM

## 2018-12-27 PROCEDURE — 99215 OFFICE O/P EST HI 40 MIN: CPT | Performed by: PSYCHIATRY & NEUROLOGY

## 2018-12-27 NOTE — PROGRESS NOTES
Progress Note - Isabel 38 77 y o  female   [id number], MRN: [id number]  2018          Follow Up Evaluation / Problem:     Obstructive Sleep Apnea  Obesity  History of atrial fibrillation    HPI: Chuck Ribeiro is a 77y o  year old female  She has been using nasal BiPAP for treatment of obstructive sleep apnea and is currently having good success using and EPAP of 5 cm and IPAP of 9 cm of water          Current Outpatient Prescriptions:     aspirin (ECOTRIN LOW STRENGTH) 81 mg EC tablet, Take 1 tablet (81 mg total) by mouth daily, Disp: 30 tablet, Rfl: 11    atorvastatin (LIPITOR) 40 mg tablet, Take 1 tablet (40 mg total) by mouth every evening, Disp: 30 tablet, Rfl: 11    furosemide (LASIX) 20 mg tablet, Take 1 tablet (20 mg total) by mouth daily, Disp: 30 tablet, Rfl: 0    metoprolol tartrate (LOPRESSOR) 25 mg tablet, Take 25 mg by mouth 2 (two) times a day, Disp: , Rfl:     montelukast (SINGULAIR) 10 mg tablet, Take 10 mg by mouth every evening, Disp: , Rfl: 0    rivaroxaban (XARELTO) 20 mg tablet, Take 20 mg by mouth daily, Disp: , Rfl:     Tetrahydrozoline HCl (EYE DROPS OP), Apply to eye, Disp: , Rfl:     timolol (TIMOPTIC-XE) 0 5 % ophthalmic gel-forming, , Disp: , Rfl:     [place] Sleepiness Scale  Sitting and reading: Slight chance of dozing  Watching TV: Slight chance of dozing  Sitting, inactive in a public place (e g  a theatre or a meeting): Slight chance of dozing  As a passenger in a car for an hour without a break: Would never doze  Lying down to rest in the afternoon when circumstances permit: Slight chance of dozing  Sitting and talking to someone: Would never doze  Sitting quietly after a lunch without alcohol: Would never doze  In a car, while stopped for a few minutes in traffic: Would never doze  Total score: 4              Vitals:    18 1000   BP: 110/66   Pulse: 64   Weight: 72 6 kg (160 lb)   Height: 4' 11" (1 499 m) Body mass index is 32 32 kg/m²  Neck Circumference: 32       [place]WORTH SLEEPINESS SCORE  Total score: 4      Past History Since Last Sleep Center Visit:     On February 24th 2018 she was admitted to the [facility] after suffering symptoms of lightheadedness, mild ataxia and slurred speech  On examination she was also found to have some facial asymmetry with right-sided ptosis  She states that this facial weakness has been present since childhood  She also reports that her other symptoms resolved shortly after arriving at the emergency room  Her workup included an MRI and CTA scan of the brain  The CTA showed no evidence of abnormal vascular in the carotid or vertebrobasilar system  MRI of the brain without contrast demonstrated a focus of acute ischemia within the cerebellar vermis in the superior region extending into the right paramedian cerebellar hemisphere  Mild scattered white matter changes were also seen along with multiple small old lacunar infarcts  No other abnormalities were identified  The review of systems and following portions of the patient's history were reviewed and updated as appropriate: allergies, current medications, past family history, past medical history, past social history, past surgical history, and problem list     Review of Systems      Genitourinary none   Cardiology none   Gastrointestinal none   Neurology none   Constitutional none   Integumentary none   Psychiatry none   Musculoskeletal none   Pulmonary none   ENT none   Endocrine none   Hematological none       OBJECTIVE    PAP Pressure: Nasal BiPAP set to deliver 5 cm of IPAP and 12 cm of EPAP  DME Provider:  [employer]    Physical Exam:     General Appearance:   Alert, cooperative, no distress, appears stated age, mildly obese     Head:   Normocephalic, without obvious abnormality, atraumatic     Eyes:   PERRL, conjunctiva/corneas clear, EOM's intact, ptosis and decreased palpebral fissure O D           Nose:  Nares normal, septum midline, no drainage or sinus tenderness     Neck:  Supple, symmetrical, trachea midline, no adenopathy; Thyroid: No enlargement, tenderness or nodules; no carotid bruit or JVD     Lungs:      Clear to auscultation bilaterally, respirations unlabored     Heart:   Irregular rate and rhythm consistent with a diagnosis of atrial fibrillation, S1 and S2 normal, no murmur, rub or gallop       Extremities:  Extremities normal, atraumatic, no cyanosis, mild edema distal lower extremities     Pulses:  2+ and symmetric all extremities     Skin:  Skin color, texture, turgor normal, no rashes or lesions       Neurologic:  CNII-XII intact  Normal strength, sensation throughout       ASSESSMENT / PLAN    1  LENARD on CPAP     2  PAF (paroxysmal atrial fibrillation) ([facility] [address] )     3  Lower extremity edema     4  Acute embolic stroke [facility])             Counseling / Coordination of Care  Total clinic time spent today 40 minutes  Greater than 50% of total time was spent with the patient and / or family counseling and / or coordination of care  A description of the counseling / coordination of care:     Impressions, Diagnostic results, Prognosis, Instructions for management, Risks and benefits of treatment, Patient and family education, Risk factor reductions and Importance of compliance with treatment     The following instructions have been given to the patient today:    Patient Instructions   1  Continue nasal BiPAP using 5 cm of EPAP and 9 cm of IPAP  2  Provide a prescription for new supplies to last over the next 12 months  3  Return in 1 year for routine re-evaluation and review of compliance data  4  Contact the [facility] if any new problems arise prior to that visit    I went over the results of her MRI and CTA    Although her symptoms were short lived she has structural evidence suggesting a small acute infarction in the cerebellar vermis and right paramedian cerebellar hemisphere  There was no evidence of significant vascular disease  Her symptom complex what appear to correlate with the location of her ischemic event  Given the circumstances it would appear that this was an embolic event, most likely secondary to her atrial fibrillation  She is currently being treated for that problem with a beta-blocker, rivaroxaban and aspirin  Today's examination demonstrates a with me a consistent with atrial fibrillation  She will continue to use nasal BiPAP as noted above  I have asked her to return in 1 year for routine re-evaluation  She will contact the [facility] if any problems arise prior to that time      Jack Waldron

## 2018-12-27 NOTE — PATIENT INSTRUCTIONS
1   Continue nasal BiPAP using 5 cm of EPAP and 9 cm of IPAP  2  Provide a prescription for new supplies to last over the next 12 months  3  Return in 1 year for routine re-evaluation and review of compliance data  4    Contact the [facility] if any new problems arise prior to that visit

## 2019-01-17 ENCOUNTER — OFFICE VISIT (OUTPATIENT)
Dept: CARDIOLOGY CLINIC | Facility: CLINIC | Age: 79
End: 2019-01-17
Payer: COMMERCIAL

## 2019-01-17 VITALS
OXYGEN SATURATION: 89 % | BODY MASS INDEX: 32.66 KG/M2 | WEIGHT: 162 LBS | HEIGHT: 59 IN | SYSTOLIC BLOOD PRESSURE: 110 MMHG | HEART RATE: 87 BPM | DIASTOLIC BLOOD PRESSURE: 54 MMHG

## 2019-01-17 DIAGNOSIS — R60.0 LOWER EXTREMITY EDEMA: ICD-10-CM

## 2019-01-17 DIAGNOSIS — I48.20 CHRONIC ATRIAL FIBRILLATION (HCC): ICD-10-CM

## 2019-01-17 DIAGNOSIS — G47.33 OSA ON CPAP: Primary | ICD-10-CM

## 2019-01-17 DIAGNOSIS — Z99.89 OSA ON CPAP: Primary | ICD-10-CM

## 2019-01-17 PROCEDURE — 99214 OFFICE O/P EST MOD 30 MIN: CPT | Performed by: INTERNAL MEDICINE

## 2019-01-17 RX ORDER — FUROSEMIDE 20 MG/1
20 TABLET ORAL DAILY
Qty: 90 TABLET | Refills: 3 | Status: SHIPPED | OUTPATIENT
Start: 2019-01-17

## 2019-01-17 NOTE — PROGRESS NOTES
Cardiology Outpatient Progress Note - Villa Alanis 77 y o  female MRN: [id number]    @ Encounter: [id number]      Patient Active Problem List    Diagnosis Date Noted    Lower extremity edema 37/41/9532    Acute embolic stroke ([place] [address] ) 02/33/1463    Chronic a-fib ([place] [address] ) 07/24/2018    Pulmonary HTN ([place] [address] ) 04/13/2018    Class 2 obesity with serious comorbidity and body mass index (BMI) of 35 0 to 35 9 in adult 04/13/2018    LENARD on CPAP 04/13/2018    PAF (paroxysmal atrial fibrillation) ([place] [address] ) 10/05/2017       Assessment:  #  Chronic Afib- [id number] of 4, had small CVA on Xarelto so now ASA 81 mg added  metoprolol tartrate to 25 mg BID  Echo 5/7/18: EF: 55-60%, PASP was ok  Echo 6/6/17: EF: 65% PASP 55 mmHg  # CVA- MRI brain 7/25/18: small infarcts with superior aspect of the cerebellar vermis  Bubble study 7/25/18: negative  Asa 81 mg and Xarelto  # Pulmonary venous HTN- PASP 55 mmHg on echo, normal RV function, no symptoms  Normal RV size and function  # Obesity with associated LE edema  # HFpEF  Lasix 20 mg daily   # Severe LENARD on home sleep study and lowest O2 sat 68%,   Polysomnogram 8/19/17: lowest O2 sat 63%, 48 min under 90%-   Now on nasal BiPap  TODAY'S PLAN:  Continue with lasix 20 mg daily  Will check BMP now that on lasix  Continue asa 81 mg with Xarelto 20 mg     HPI:   76 yo female presents for first evaluation of new afib  Went to primary doctor for physical and EKG done showing afib with rate 140 bpm  She does not know how long she has been in it  NO chest pain or dyspnea  No edema  She was started on metoprolol and xarelto appropriately  No history of thyroid dysfunction  She does snore per her family  Holter done June 19 and not in afib  Echo done and EF: 65% with grade 2 DD and PASP 55 mmHg  Home sleep study revealed severe LENARD with lowest sat 68%, had 30 respiratory events  PSG, 8/19/17: lowest O2 sat 63%, 48 min under 90%- fitted for nasal CPAP     Pt had acute, small ischemic infarct cerebellar vermal area  Suspected to be embolic, pt on Xarelto  Bubble study on echo done and negative for PFO  Asa 81 mg daily added to regimen  She has no permanent defecit    Interval History:   Was put on furosemide for edema a couple weeks ago    Past Medical History:   Diagnosis Date    Atrial fibrillation ([address] )        Review of Systems -     No Known Allergies    Current Outpatient Prescriptions:     aspirin (ECOTRIN LOW STRENGTH) 81 mg EC tablet, Take 1 tablet (81 mg total) by mouth daily, Disp: 30 tablet, Rfl: 11    atorvastatin (LIPITOR) 40 mg tablet, Take 1 tablet (40 mg total) by mouth every evening, Disp: 30 tablet, Rfl: 11    furosemide (LASIX) 20 mg tablet, Take 1 tablet (20 mg total) by mouth daily, Disp: 30 tablet, Rfl: 0    metoprolol tartrate (LOPRESSOR) 25 mg tablet, Take 25 mg by mouth 2 (two) times a day, Disp: , Rfl:     montelukast (SINGULAIR) 10 mg tablet, Take 10 mg by mouth every evening, Disp: , Rfl: 0    rivaroxaban (XARELTO) 20 mg tablet, Take 20 mg by mouth daily, Disp: , Rfl:     Tetrahydrozoline HCl (EYE DROPS OP), Apply to eye, Disp: , Rfl:     timolol (TIMOPTIC-XE) 0 5 % ophthalmic gel-forming, , Disp: , Rfl:     Social History     Social History    Marital status:      Spouse name: N/A    Number of children: N/A    Years of education: N/A     Occupational History    Not on file       Social History Main Topics    Smoking status: Never Smoker    Smokeless tobacco: Never Used    Alcohol use No    Drug use: No    Sexual activity: Not on file     Other Topics Concern    Not on file     Social History Narrative    No narrative on file       Family History   Problem Relation Age of Onset    Parkinsonism Brother     Dementia Brother        Physical Exam:    Vitals: Blood pressure 110/54, pulse 87, height 4' 11" (1 499 m), weight 73 5 kg (162 lb), SpO2 (!) 89 %, not currently breastfeeding , Body mass index is 32 72 kg/m² ,   Wt Readings from Last 3 Encounters:   01/17/19 73 5 kg (162 lb)   12/27/18 72 6 kg (160 lb)   08/23/18 75 kg (165 lb 6 4 oz)       Physical Exam:    GEN: Alysha Gee appears well, alert and oriented x 3, pleasant and cooperative   HEENT: pupils equal, round, and reactive to light; extraocular muscles intact  NECK: supple, no carotid bruits   HEART: regular rhythm, normal S1 and S2, no murmurs, clicks, gallops or rubs, JVP is  down  LUNGS: clear to auscultation bilaterally; no wheezes, rales, or rhonchi   ABDOMEN: normal bowel sounds, soft, no tenderness, no distention  EXTREMITIES: peripheral pulses normal; no clubbing, cyanosis, or edema  NEURO: no focal findings   SKIN: normal without suspicious lesions on exposed skin    Labs & Results:    Lab Results   Component Value Date    CALCIUM 8 7 07/25/2018    K 3 9 07/25/2018    CO2 29 07/25/2018     07/25/2018    BUN 16 07/25/2018    CREATININE 0 81 07/25/2018     Lab Results   Component Value Date    WBC 4 70 07/25/2018    HGB 13 3 07/25/2018    HCT 40 5 07/25/2018     07/25/2018     07/25/2018       EKG personally reviewed by Inge Freire DO  Counseling / Coordination of Care  Total floor / unit time spent today 25 minutes  Greater than 50% of total time was spent with the patient and / or family counseling and / or coordination of care  A description of the counseling / coordination of care: 15  Thank you for the opportunity to participate in the care of this patient    DARINEL VOGT [address] Dora Fowler

## 2019-02-22 ENCOUNTER — OFFICE VISIT (OUTPATIENT)
Dept: NEUROLOGY | Facility: CLINIC | Age: 79
End: 2019-02-22
Payer: COMMERCIAL

## 2019-02-22 VITALS
WEIGHT: 162 LBS | HEIGHT: 58 IN | SYSTOLIC BLOOD PRESSURE: 118 MMHG | HEART RATE: 74 BPM | DIASTOLIC BLOOD PRESSURE: 70 MMHG | RESPIRATION RATE: 16 BRPM | BODY MASS INDEX: 34 KG/M2

## 2019-02-22 DIAGNOSIS — I63.9 ACUTE EMBOLIC STROKE (HCC): Primary | ICD-10-CM

## 2019-02-22 PROCEDURE — 99213 OFFICE O/P EST LOW 20 MIN: CPT | Performed by: PSYCHIATRY & NEUROLOGY

## 2019-02-22 NOTE — PROGRESS NOTES
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, felt to be on an embolic basis in the face of her known chronic atrial fibrillation  Fortunately has done well since  She reports no symptoms to suggest any residual deficit  She has had no symptoms to suggest any recurrent cerebro- vascular ischemic events  She has continued on Xarelto combined with the daily 81 mg aspirin which was added at the time of her stroke event  --she will continue Xarelto combined with the daily 81 mg aspirin as prescribed through Cardiology and she will continue her routine cardiology follow-up appointments  --she will continue her atorvastatin as prescribed through her primary care physician as well as her appropriate primary care follow-up appointments  She will follow up in one year or p r n     Subjective:    HPI  Patient, age 77 years and right-handed, is being seen in follow-up given her history of a small acute embolic verbal infarct occurring in late July of this past year  She was accompanied today by her daughter, Chapis Montano  Her acute symptoms were characterized by dysarthria and unsteadiness with the discovery of her small verbal infarct  This was felt to be on an embolic basis given her known chronic atrial fibrillation  She was at the time already on Xarelto  To the Xarelto an 81 mg daily aspirin was added  Fortunately, she has described no residual symptomatic issues  Also, very fortunately, she has had no symptoms since to suggest any recurrent cerebral vascular ischemic event  She continues her close work with her primary care physician and also with her cardiologist     Past Medical History:   Diagnosis Date    Atrial fibrillation [facility])      Past Surgical History:   Procedure Laterality Date    BREAST SURGERY       Social History     Socioeconomic History    Marital status:   Spouse name: None    Number of children: None    Years of education: None    Highest education level: None   Occupational History    None   Social Needs    Financial resource strain: None    Food insecurity:     Worry: None     Inability: None    Transportation needs:     Medical: None     Non-medical: None   Tobacco Use    Smoking status: Never Smoker    Smokeless tobacco: Never Used   Substance and Sexual Activity    Alcohol use: No    Drug use: No    Sexual activity: None   Lifestyle    Physical activity:     Days per week: None     Minutes per session: None    Stress: None   Relationships    Social connections:     Talks on phone: None     Gets together: None     Attends Je[employer] witness service: None     Active member of club or organization: None     Attends meetings of clubs or organizations: None     Relationship status: None    Intimate partner violence:     Fear of current or ex partner: None     Emotionally abused: None     Physically abused: None     Forced sexual activity: None   Other Topics Concern    None   Social History Narrative    None     Family History   Problem Relation Age of Onset    Parkinsonism Brother     Dementia Brother      No Known Allergies    Current Outpatient Medications:     aspirin (ECOTRIN LOW STRENGTH) 81 mg EC tablet, Take 1 tablet (81 mg total) by mouth daily, Disp: 30 tablet, Rfl: 11    atorvastatin (LIPITOR) 40 mg tablet, Take 1 tablet (40 mg total) by mouth every evening, Disp: 30 tablet, Rfl: 11    furosemide (LASIX) 20 mg tablet, Take 1 tablet (20 mg total) by mouth daily, Disp: 90 tablet, Rfl: 3    metoprolol tartrate (LOPRESSOR) 25 mg tablet, Take 25 mg by mouth 2 (two) times a day, Disp: , Rfl:     montelukast (SINGULAIR) 10 mg tablet, Take 10 mg by mouth every evening, Disp: , Rfl: 0    rivaroxaban (XARELTO) 20 mg tablet, Take 20 mg by mouth daily, Disp: , Rfl:     Tetrahydrozoline HCl (EYE DROPS OP), Apply to eye, Disp: , Rfl:     timolol (TIMOPTIC-XE) 0 5 % ophthalmic gel-forming, , Disp: , Rfl:     Objective:    Blood pressure 118/70, pulse 74, resp  rate 16, height 4' 9 75" (1 467 m), weight 73 5 kg (162 lb), not currently breastfeeding  Physical Exam  Lungs clear to auscultation  Rhythm irregular  Neurological Exam  Alert  Pleasantly interactive  Fully oriented  No significant dysarthria  Gait independent  With practice able to tandem walk  Romberg maneuver performed unremarkably  Cranial nerves 2-12 tested and grossly intact except for a mild left lower facial asymmetry at all along with widening of the left palpebra fissure  Funduscopic examination with marginated discs  Accurate with finger-to-nose and heel-to-shin maneuvers bilaterally  Good symmetrical strength throughout the four extremities with no upper extremity drift  Sensory testing grossly intact to pin and position throughout  Diffusely and symmetrically hyporeflexic  Toe response downgoing bilaterally  ROS:    Review of Systems   Constitutional: Negative  Negative for appetite change and fever  HENT: Negative  Negative for hearing loss, tinnitus, trouble swallowing and voice change  Eyes: Negative  Negative for photophobia and pain  Respiratory: Negative  Negative for shortness of breath  Cardiovascular: Negative  Negative for palpitations  Gastrointestinal: Negative  Negative for nausea and vomiting  Endocrine: Negative  Negative for cold intolerance and heat intolerance  Genitourinary: Negative  Negative for dysuria, frequency and urgency  Musculoskeletal: Negative  Negative for myalgias and neck pain  Skin: Negative  Negative for rash  Allergic/Immunologic: Negative  Neurological: Negative  Negative for dizziness, tremors, seizures, syncope, facial asymmetry, speech difficulty, weakness, light-headedness, numbness and headaches  Hematological: Negative  Does not bruise/bleed easily  Psychiatric/Behavioral: Negative  Negative for confusion, hallucinations and sleep disturbance  I personally reviewed the ROS that was entered by the medical assistant  *Please note this document was created using voice recognition software and may contain sound-alike word errors  *

## 2019-02-22 NOTE — LETTER
February 22, 2019     Joanie Goodwin MD  [facility]  [address]    Patient: Kirk Walker   YOB: 1940   Date of Visit: 2/22/2019       Dear Dr Alonzo Howard:    Thank you for referring Katarina Ruffin to me for evaluation  Below are my notes for this consultation  If you have questions, please do not hesitate to call me  I look forward to following your patient along with you  Sincerely,        Henry Covington MD        CC: No Recipients  Henry Covington MD  2/22/2019  9:24 AM  Sign at close encounter  Patient is here for her A- Fib    Patient ID: Kirk Walker is a 77 y o  female  Assessment/Plan:    Embolic stroke  With a small acute verbal infarct late July 2018, felt to be on an embolic basis in the face of her known chronic atrial fibrillation  Fortunately has done well since  She reports no symptoms to suggest any residual deficit  She has had no symptoms to suggest any recurrent cerebro- vascular ischemic events  She has continued on Xarelto combined with the daily 81 mg aspirin which was added at the time of her stroke event  --she will continue Xarelto combined with the daily 81 mg aspirin as prescribed through Cardiology and she will continue her routine cardiology follow-up appointments  --she will continue her atorvastatin as prescribed through her primary care physician as well as her appropriate primary care follow-up appointments  She will follow up in one year or p r n     Subjective:    HPI  Patient, age 77 years and right-handed, is being seen in follow-up given her history of a small acute embolic verbal infarct occurring in late July of this past year  She was accompanied today by her daughter, Lopez  Her acute symptoms were characterized by dysarthria and unsteadiness with the discovery of her small verbal infarct  This was felt to be on an embolic basis given her known chronic atrial fibrillation    She was at the time already on Xarelto  To the Xarelto an 81 mg daily aspirin was added  Fortunately, she has described no residual symptomatic issues  Also, very fortunately, she has had no symptoms since to suggest any recurrent cerebral vascular ischemic event  She continues her close work with her primary care physician and also with her cardiologist     Past Medical History:   Diagnosis Date    Atrial fibrillation [facility])      Past Surgical History:   Procedure Laterality Date    BREAST SURGERY       Social History     Socioeconomic History    Marital status:       Spouse name: None    Number of children: None    Years of education: None    Highest education level: None   Occupational History    None   Social Needs    Financial resource strain: None    Food insecurity:     Worry: None     Inability: None    Transportation needs:     Medical: None     Non-medical: None   Tobacco Use    Smoking status: Never Smoker    Smokeless tobacco: Never Used   Substance and Sexual Activity    Alcohol use: No    Drug use: No    Sexual activity: None   Lifestyle    Physical activity:     Days per week: None     Minutes per session: None    Stress: None   Relationships    Social connections:     Talks on phone: None     Gets together: None     Attends [employer] service: None     Active member of club or organization: None     Attends meetings of clubs or organizations: None     Relationship status: None    Intimate partner violence:     Fear of current or ex partner: None     Emotionally abused: None     Physically abused: None     Forced sexual activity: None   Other Topics Concern    None   Social History Narrative    None     Family History   Problem Relation Age of Onset    Parkinsonism Brother     Dementia Brother      No Known Allergies    Current Outpatient Medications:     aspirin (ECOTRIN LOW STRENGTH) 81 mg EC tablet, Take 1 tablet (81 mg total) by mouth daily, Disp: 30 tablet, Rfl: 11   atorvastatin (LIPITOR) 40 mg tablet, Take 1 tablet (40 mg total) by mouth every evening, Disp: 30 tablet, Rfl: 11    furosemide (LASIX) 20 mg tablet, Take 1 tablet (20 mg total) by mouth daily, Disp: 90 tablet, Rfl: 3    metoprolol tartrate (LOPRESSOR) 25 mg tablet, Take 25 mg by mouth 2 (two) times a day, Disp: , Rfl:     montelukast (SINGULAIR) 10 mg tablet, Take 10 mg by mouth every evening, Disp: , Rfl: 0    rivaroxaban (XARELTO) 20 mg tablet, Take 20 mg by mouth daily, Disp: , Rfl:     Tetrahydrozoline HCl (EYE DROPS OP), Apply to eye, Disp: , Rfl:     timolol (TIMOPTIC-XE) 0 5 % ophthalmic gel-forming, , Disp: , Rfl:     Objective:    Blood pressure 118/70, pulse 74, resp  rate 16, height 4' 9 75" (1 467 m), weight 73 5 kg (162 lb), not currently breastfeeding  Physical Exam  Lungs clear to auscultation  Rhythm irregular  Neurological Exam  Alert  Pleasantly interactive  Fully oriented  No significant dysarthria  Gait independent  With practice able to tandem walk  Romberg maneuver performed unremarkably  Cranial nerves 2-12 tested and grossly intact except for a mild left lower facial asymmetry at all along with widening of the left palpebra fissure  Funduscopic examination with marginated discs  Accurate with finger-to-nose and heel-to-shin maneuvers bilaterally  Good symmetrical strength throughout the four extremities with no upper extremity drift  Sensory testing grossly intact to pin and position throughout  Diffusely and symmetrically hyporeflexic  Toe response downgoing bilaterally  ROS:    Review of Systems   Constitutional: Negative  Negative for appetite change and fever  HENT: Negative  Negative for hearing loss, tinnitus, trouble swallowing and voice change  Eyes: Negative  Negative for photophobia and pain  Respiratory: Negative  Negative for shortness of breath  Cardiovascular: Negative  Negative for palpitations  Gastrointestinal: Negative  Negative for nausea and vomiting  Endocrine: Negative  Negative for cold intolerance and heat intolerance  Genitourinary: Negative  Negative for dysuria, frequency and urgency  Musculoskeletal: Negative  Negative for myalgias and neck pain  Skin: Negative  Negative for rash  Allergic/Immunologic: Negative  Neurological: Negative  Negative for dizziness, tremors, seizures, syncope, facial asymmetry, speech difficulty, weakness, light-headedness, numbness and headaches  Hematological: Negative  Does not bruise/bleed easily  Psychiatric/Behavioral: Negative  Negative for confusion, hallucinations and sleep disturbance  I personally reviewed the ROS that was entered by the medical assistant  *Please note this document was created using voice recognition software and may contain sound-alike word errors  *

## 2019-02-22 NOTE — ASSESSMENT & PLAN NOTE
With a small acute verbal infarct late July 2018, felt to be on an embolic basis in the face of her known chronic atrial fibrillation  Fortunately has done well since  She reports no symptoms to suggest any residual deficit  She has had no symptoms to suggest any recurrent cerebro- vascular ischemic events  She has continued on Xarelto combined with the daily 81 mg aspirin which was added at the time of her stroke event  --she will continue Xarelto combined with the daily 81 mg aspirin as prescribed through Cardiology and she will continue her routine cardiology follow-up appointments  --she will continue her atorvastatin as prescribed through her primary care physician as well as her appropriate primary care follow-up appointments

## 2019-05-26 DIAGNOSIS — I63.9 ACUTE EMBOLIC STROKE (HCC): ICD-10-CM

## 2019-05-28 RX ORDER — ATORVASTATIN CALCIUM 40 MG/1
TABLET, FILM COATED ORAL
Qty: 30 TABLET | Refills: 10 | OUTPATIENT
Start: 2019-05-28

## 2019-06-21 ENCOUNTER — TELEPHONE (OUTPATIENT)
Dept: NEUROLOGY | Facility: CLINIC | Age: 79
End: 2019-06-21

## 2019-09-17 ENCOUNTER — OFFICE VISIT (OUTPATIENT)
Dept: CARDIOLOGY CLINIC | Facility: CLINIC | Age: 79
End: 2019-09-17
Payer: COMMERCIAL

## 2019-09-17 VITALS
BODY MASS INDEX: 32.31 KG/M2 | OXYGEN SATURATION: 100 % | SYSTOLIC BLOOD PRESSURE: 110 MMHG | HEIGHT: 58 IN | HEART RATE: 72 BPM | WEIGHT: 153.9 LBS | DIASTOLIC BLOOD PRESSURE: 60 MMHG

## 2019-09-17 DIAGNOSIS — Z86.73 HISTORY OF CVA IN ADULTHOOD: ICD-10-CM

## 2019-09-17 DIAGNOSIS — I27.20 PULMONARY HYPERTENSION (HCC): ICD-10-CM

## 2019-09-17 DIAGNOSIS — G47.33 OSA ON CPAP: ICD-10-CM

## 2019-09-17 DIAGNOSIS — I48.20 CHRONIC ATRIAL FIBRILLATION (HCC): Primary | ICD-10-CM

## 2019-09-17 DIAGNOSIS — I50.32 CHRONIC HEART FAILURE WITH PRESERVED EJECTION FRACTION (HCC): ICD-10-CM

## 2019-09-17 DIAGNOSIS — Z99.89 OSA ON CPAP: ICD-10-CM

## 2019-09-17 PROCEDURE — 99214 OFFICE O/P EST MOD 30 MIN: CPT | Performed by: INTERNAL MEDICINE

## 2019-09-17 NOTE — PROGRESS NOTES
Cardiology Outpatient Progress Note - Luisa Vizcarra [de-identified] y o  female MRN: [id number]    @ Encounter: [id number]      Patient Active Problem List    Diagnosis Date Noted    Lower extremity edema 21/68/8166    Embolic stroke 77/43/8973    Chronic a-fib ([facility] ) 07/24/2018    Pulmonary HTN ([facility] ) 04/13/2018    Class 2 obesity with serious comorbidity and body mass index (BMI) of 35 0 to 35 9 in adult 04/13/2018    LENARD on CPAP 04/13/2018    PAF (paroxysmal atrial fibrillation) ([facility] ) 10/05/2017       Assessment:  #  Chronic Afib- [id number] of 4, had small CVA on Xarelto so now ASA 81 mg added  metoprolol tartrate to 25 mg BID  Echo 5/7/18: EF: 55-60%, PASP was ok  Echo 6/6/17: EF: 65% PASP 55 mmHg  # CVA- MRI brain 7/25/18: small infarcts with superior aspect of the cerebellar vermis  Bubble study 7/25/18: negative  Asa 81 mg and Xarelto  # Pulmonary venous HTN- PASP 55 mmHg on echo, normal RV function, no symptoms  Normal RV size and function  # Obesity with associated LE edema  # HFpEF  Lasix 20 mg daily   Wt: 154 lbs, down 8 lbs  # Severe LENARD on home sleep study and lowest O2 sat 68%,   Polysomnogram 8/19/17: lowest O2 sat 63%, 48 min under 90%-   Now on nasal BiPap  TODAY'S PLAN:      HPI:   [de-identified] yo female presents for first evaluation of new afib  Went to primary doctor for physical and EKG done showing afib with rate 140 bpm  She does not know how long she has been in it  NO chest pain or dyspnea  No edema  She was started on metoprolol and xarelto appropriately  No history of thyroid dysfunction  She does snore per her family  Holter done June 19 and not in afib  Echo done and EF: 65% with grade 2 DD and PASP 55 mmHg  Home sleep study revealed severe LENARD with lowest sat 68%, had 30 respiratory events  PSG, 8/19/17: lowest O2 sat 63%, 48 min under 90%- fitted for nasal CPAP  Pt had acute, small ischemic infarct cerebellar vermal area  Suspected to be embolic, pt on Xarelto   Bubble study on echo done and negative for PFO  Asa 81 mg daily added to regimen  She has no permanent defecit    Interval History:    Doing fine, no changes  No falls, no bleeding    Review of Systems   Constitutional: Negative for activity change, appetite change, fatigue and unexpected weight change  HENT: Negative for congestion and nosebleeds  Eyes: Negative  Respiratory: Negative for cough, chest tightness and shortness of breath  Cardiovascular: Negative for chest pain, palpitations and leg swelling  Gastrointestinal: Negative for abdominal distention  Endocrine: Negative  Genitourinary: Negative  Musculoskeletal: Negative  Skin: Negative  Neurological: Negative for dizziness, syncope and weakness  Hematological: Negative  Psychiatric/Behavioral: Negative  Past Medical History:   Diagnosis Date    Atrial fibrillation (HCC)        No Known Allergies    Current Outpatient Medications:     aspirin (ECOTRIN LOW STRENGTH) 81 mg EC tablet, Take 1 tablet (81 mg total) by mouth daily, Disp: 30 tablet, Rfl: 11    atorvastatin (LIPITOR) 40 mg tablet, Take 1 tablet (40 mg total) by mouth every evening, Disp: 30 tablet, Rfl: 11    furosemide (LASIX) 20 mg tablet, Take 1 tablet (20 mg total) by mouth daily, Disp: 90 tablet, Rfl: 3    metoprolol tartrate (LOPRESSOR) 25 mg tablet, Take 25 mg by mouth 2 (two) times a day, Disp: , Rfl:     montelukast (SINGULAIR) 10 mg tablet, Take 10 mg by mouth every evening, Disp: , Rfl: 0    rivaroxaban (XARELTO) 20 mg tablet, Take 20 mg by mouth daily, Disp: , Rfl:     Tetrahydrozoline HCl (EYE DROPS OP), Apply to eye, Disp: , Rfl:     timolol (TIMOPTIC-XE) 0 5 % ophthalmic gel-forming, , Disp: , Rfl:     Social History     Socioeconomic History    Marital status:       Spouse name: Not on file    Number of children: Not on file    Years of education: Not on file    Highest education level: Not on file   Occupational History    Not on file   Social Needs  Financial resource strain: Not on file   Nadine-Francisco insecurity:     Worry: Not on file     Inability: Not on file    Transportation needs:     Medical: Not on file     Non-medical: Not on file   Tobacco Use    Smoking status: Never Smoker    Smokeless tobacco: Never Used   Substance and Sexual Activity    Alcohol use: No    Drug use: No    Sexual activity: Not on file   Lifestyle    Physical activity:     Days per week: Not on file     Minutes per session: Not on file    Stress: Not on file   Relationships    Social connections:     Talks on phone: Not on file     Gets together: Not on file     Attends [employer] service: Not on file     Active member of club or organization: Not on file     Attends meetings of clubs or organizations: Not on file     Relationship status: Not on file    Intimate partner violence:     Fear of current or ex partner: Not on file     Emotionally abused: Not on file     Physically abused: Not on file     Forced sexual activity: Not on file   Other Topics Concern    Not on file   Social History Narrative    Not on file       Family History   Problem Relation Age of Onset    Parkinsonism Brother     Dementia Brother        Physical Exam:    Vitals: Blood pressure 110/60, pulse 72, height 4' 9 75" (1 467 m), weight 69 8 kg (153 lb 14 4 oz), SpO2 100 %, not currently breastfeeding , Body mass index is 32 44 kg/m² ,   Wt Readings from Last 3 Encounters:   09/17/19 69 8 kg (153 lb 14 4 oz)   02/22/19 73 5 kg (162 lb)   01/17/19 73 5 kg (162 lb)       Physical Exam   Constitutional: She is oriented to person, place, and time  She appears well-developed and well-nourished  HENT:   Head: Normocephalic and atraumatic  Eyes: Pupils are equal, round, and reactive to light  EOM are normal    Neck: Normal range of motion  No JVD present  Cardiovascular: Normal rate, regular rhythm and normal heart sounds  No murmur heard    Pulmonary/Chest: Effort normal and breath sounds normal  She has no rales  Abdominal: Soft  Bowel sounds are normal  She exhibits no distension  Musculoskeletal: Normal range of motion  She exhibits no edema  Neurological: She is alert and oriented to person, place, and time  Skin: Skin is warm and dry  She is not diaphoretic  Psychiatric: She has a normal mood and affect  Labs & Results:    Lab Results   Component Value Date    CALCIUM 8 7 07/25/2018    K 3 9 07/25/2018    CO2 29 07/25/2018     07/25/2018    BUN 16 07/25/2018    CREATININE 0 81 07/25/2018     Lab Results   Component Value Date    WBC 4 70 07/25/2018    HGB 13 3 07/25/2018    HCT 40 5 07/25/2018     07/25/2018     07/25/2018       EKG personally reviewed by Nagi Walden DO  Counseling / Coordination of Care  Total floor / unit time spent today 25 minutes  Greater than 50% of total time was spent with the patient and / or family counseling and / or coordination of care  A description of the counseling / coordination of care: 15  Thank you for the opportunity to participate in the care of this patient    DARINEL VOGT [address]

## 2020-01-13 ENCOUNTER — OFFICE VISIT (OUTPATIENT)
Dept: SLEEP CENTER | Facility: CLINIC | Age: 80
End: 2020-01-13
Payer: COMMERCIAL

## 2020-01-13 VITALS
WEIGHT: 147 LBS | SYSTOLIC BLOOD PRESSURE: 122 MMHG | HEIGHT: 57 IN | BODY MASS INDEX: 31.71 KG/M2 | DIASTOLIC BLOOD PRESSURE: 76 MMHG

## 2020-01-13 DIAGNOSIS — G47.33 OBSTRUCTIVE SLEEP APNEA TREATED WITH CONTINUOUS POSITIVE AIRWAY PRESSURE (CPAP): Primary | ICD-10-CM

## 2020-01-13 DIAGNOSIS — E66.9 OBESITY (BMI 30-39.9): ICD-10-CM

## 2020-01-13 DIAGNOSIS — Z99.89 OBSTRUCTIVE SLEEP APNEA TREATED WITH CONTINUOUS POSITIVE AIRWAY PRESSURE (CPAP): Primary | ICD-10-CM

## 2020-01-13 PROCEDURE — 99214 OFFICE O/P EST MOD 30 MIN: CPT | Performed by: NURSE PRACTITIONER

## 2020-01-13 NOTE — PATIENT INSTRUCTIONS
1   Schedule an appointment to bring your BiPAP equipment in to [facility] to have it checked  2   After having it checked, continue to use the equipment every night  2   Continue to clean your equipment, as discussed  3  Contact the Sleep [address] [place] [address] with any questions or concerns prior to your next visit, as needed  4  Schedule visit for follow-up in 1 year      Nursing Support:  When: Monday through Friday 7A-5PM except holidays  Where: Our direct line is [phone]  If you are having a true emergency please call 911  In the event that the line is busy or it is after hours please leave a voice message and we will return your call  Please speak clearly, leaving your full name, birth date, best number to reach you and the reason for your call  Medication refills: We will need the name of the medication, the dosage, the ordering provider, whether you get a 30 or 90 day refill, and the pharmacy name and address  Medications will be ordered by the provider only  Nurses cannot call in prescriptions  Please allow 7 days for medication refills  Physician requested updates: If your provider requested that you call with an update after starting medication, please be ready to provide us the medication and dosage, what time you take your medication, the time you attempt to fall asleep, time you fall asleep, when you wake up, and what time you get out of bed  Sleep Study Results: We will contact you with sleep study results and/or next steps after the physician has reviewed your testing

## 2020-01-13 NOTE — PROGRESS NOTES
Progress Note - Isabel 37 [de-identified] y o  female   [address], MRN: [id number]  1/13/2020          Follow Up Evaluation / Problem:     Severe Obstructive Sleep Apnea  Atrial fibrillation  Hx of embolic stroke  Obesity      Thank you for the opportunity of participating in the evaluation and care of this patient in the Sleep Clinic at [facility]  HPI: Leobardo Cheng is a [de-identified]y o  year old female  The patient presents for follow up of severe obstructive sleep apnea with significant oxygen desaturation  She was diagnosed with severe LENARD in 2017, when she completed a home sleep study  She was titrated with BiPAP equipment and has used it since diagnosis  She has a history of atrial fibrillation and embolic stroke  She has no significant deficits post CVA  She is here to review compliance with use of BiPAP equipment and effectiveness of treatment       Review of Systems      Genitourinary none   Cardiology none   Gastrointestinal none   Neurology none   Constitutional none   Integumentary none   Psychiatry none   Musculoskeletal none   Pulmonary none   ENT none   Endocrine none   Hematological none       Current Outpatient Medications:     aspirin (ECOTRIN LOW STRENGTH) 81 mg EC tablet, Take 1 tablet (81 mg total) by mouth daily, Disp: 30 tablet, Rfl: 11    atorvastatin (LIPITOR) 40 mg tablet, Take 1 tablet (40 mg total) by mouth every evening, Disp: 30 tablet, Rfl: 11    furosemide (LASIX) 20 mg tablet, Take 1 tablet (20 mg total) by mouth daily, Disp: 90 tablet, Rfl: 3    metoprolol tartrate (LOPRESSOR) 25 mg tablet, Take 25 mg by mouth 2 (two) times a day, Disp: , Rfl:     montelukast (SINGULAIR) 10 mg tablet, Take 10 mg by mouth every evening, Disp: , Rfl: 0    rivaroxaban (XARELTO) 20 mg tablet, Take 20 mg by mouth daily, Disp: , Rfl:     Tetrahydrozoline HCl (EYE DROPS OP), Apply to eye, Disp: , Rfl:    timolol (TIMOPTIC-XE) 0 5 % ophthalmic gel-forming, , Disp: , Rfl:     [place] Sleepiness Scale  Sitting and reading: Slight chance of dozing  Watching TV: Slight chance of dozing  Sitting, inactive in a public place (e g  a theatre or a meeting): Would never doze  As a passenger in a car for an hour without a break: Would never doze  Lying down to rest in the afternoon when circumstances permit: Would never doze  Sitting and talking to someone: Slight chance of dozing  Sitting quietly after a lunch without alcohol: Slight chance of dozing  In a car, while stopped for a few minutes in traffic: Slight chance of dozing  Total score: 5              Vitals:    01/13/20 0900   BP: 122/76   Weight: 66 7 kg (147 lb)   Height: 4' 9" (1 448 m)       Body mass index is 31 81 kg/m²  Neck Circumference: 13       [place] SLEEPINESS SCORE  Total score: 5      Past History Since Last Sleep Center Visit:   She denies any significant changes to her health since her last visit  She reports that she uses the BiPAP equipment every night and feels comfortable when using it  She reports that she stopped using water with the equipment because it was heating up and bubbling  She experiences oral dryness without the use of humidification  She denies excessive daytime sleepiness or need for napping  She sleeps well throughout the night and typically sleeps for 7 hours  The patient admits that she doesn't routinely clean the mask or tubing  She states that she hasn't received supplies "for awhile"  The review of systems and following portions of the patient's history were reviewed and updated as appropriate: allergies, current medications, past family history, past medical history, past social history, past surgical history, and problem list         OBJECTIVE    PAP Pressure: Nasal BiPAP set to deliver 9 cm of IPAP and 5 cm of EPAP  DME Provider:  [employer]    Physical Exam:     General Appearance:   Alert, cooperative, no distress, appears stated age, mildly obese     Head:   Normocephalic, without obvious abnormality, atraumatic     Eyes:   PERRL, conjunctiva/corneas clear, EOM's intact          Nose:  Nares normal, septum midline, no drainage or sinus tenderness           Throat:  Lips, teeth and gums normal; tongue normal size and  shape and midline mucosa moist and redundant bilaterally, uvula normal in size and dipping below the base of the tongue, tonsils not visualized, Mallampati class 4       Neck:  Supple, symmetrical, trachea midline, no adenopathy; Thyroid: No enlargement, tenderness or nodules; no carotid bruit or JVD     Lungs:      Clear to auscultation bilaterally, respirations unlabored     Heart:   Irregularly irregular rate and rhythm, S1 and S2 normal, no murmur, rub or gallop       Extremities:  Extremities normal, atraumatic, no cyanosis or edema       Skin:  Skin color, texture, turgor normal, no rashes or lesions       Neurologic:  No focal deficits noted       ASSESSMENT / PLAN    1  Obstructive sleep apnea treated with continuous positive airway pressure (CPAP)  PAP DME Resupply/Reorder   2  Obesity (BMI 30-39  9)             Counseling / Coordination of Care  Total clinic time spent today 25 minutes  Greater than 50% of total time was spent with the patient and / or family counseling and / or coordination of care  A description of the counseling / coordination of care:     Impressions, Diagnostic results, Prognosis, Instructions for management, Risks and benefits of treatment, Patient and family education, Risk factor reductions and Importance of compliance with treatment    Today I reviewed the patient's compliance data  she has been able to use the equipment 100% of all days recorded  Average usage was 4 or more hours 0% of all days recorded  The estimated AHI is 0 0 abnormal breathing events per hour  It appears that there may be a problem with her BiPAP equipment    The compliance report shows that the equipment is running for extended periods of time, but there is no recorded usage  The patient states that she uses it every night, however, no usage is being recorded  This can happen if there is a disconnect or problem with a gasket  She did report a change in the use of the humidifier  I have asked her to bring the equipment to [facility] to have it checked and fixed if needed  The patient will schedule an appointment for this as soon as possible  It is medically necessary for her to continue use of BiPAP, due to severe LENARD with significant oxygen desaturation  Response to treatment has been good  Supplies have been ordered for the next year  A compliance report will be checked within the next few months  She will continue using this equipment at the settings noted above for the next 12 months  At that timeshe will then return for a routine follow-up evaluation  I have asked the patient to contact the [address] if she encounters any difficulties prior to that time  The following instructions have been given to the patient today:    Patient Instructions   1  Schedule an appointment to bring your BiPAP equipment in to [facility] to have it checked  2   After having it checked, continue to use the equipment every night  2   Continue to clean your equipment, as discussed  3  Contact the [address] with any questions or concerns prior to your next visit, as needed  4  Schedule visit for follow-up in 1 year      Nursing Support:  When: Monday through Friday 7A-5PM except holidays  Where: Our direct line is [phone]  If you are having a true emergency please call 911  In the event that the line is busy or it is after hours please leave a voice message and we will return your call  Please speak clearly, leaving your full name, birth date, best number to reach you and the reason for your call  Medication refills:  We will need the name of the medication, the dosage, the ordering provider, whether you get a 30 or 90 day refill, and the pharmacy name and address  Medications will be ordered by the provider only  Nurses cannot call in prescriptions  Please allow 7 days for medication refills  Physician requested updates: If your provider requested that you call with an update after starting medication, please be ready to provide us the medication and dosage, what time you take your medication, the time you attempt to fall asleep, time you fall asleep, when you wake up, and what time you get out of bed  Sleep Study Results: We will contact you with sleep study results and/or next steps after the physician has reviewed your testing          Emperatriz Bennett, [address] [place] [address]

## 2020-01-14 ENCOUNTER — TELEPHONE (OUTPATIENT)
Dept: SLEEP CENTER | Facility: CLINIC | Age: 80
End: 2020-01-14

## 2020-02-18 ENCOUNTER — TELEPHONE (OUTPATIENT)
Dept: NEUROLOGY | Facility: CLINIC | Age: 80
End: 2020-02-18

## 2020-03-12 ENCOUNTER — OFFICE VISIT (OUTPATIENT)
Dept: NEUROLOGY | Facility: CLINIC | Age: 80
End: 2020-03-12
Payer: COMMERCIAL

## 2020-03-12 VITALS
RESPIRATION RATE: 16 BRPM | BODY MASS INDEX: 31.99 KG/M2 | WEIGHT: 152.4 LBS | HEIGHT: 58 IN | SYSTOLIC BLOOD PRESSURE: 110 MMHG | DIASTOLIC BLOOD PRESSURE: 58 MMHG | HEART RATE: 79 BPM

## 2020-03-12 DIAGNOSIS — I63.9 ACUTE EMBOLIC STROKE (HCC): Primary | ICD-10-CM

## 2020-03-12 DIAGNOSIS — R41.3 AMNESIA/MEMORY DISORDER: ICD-10-CM

## 2020-03-12 PROCEDURE — 99204 OFFICE O/P NEW MOD 45 MIN: CPT | Performed by: PSYCHIATRY & NEUROLOGY

## 2020-03-12 RX ORDER — METHYLPREDNISOLONE 4 MG
TABLET, DOSE PACK ORAL DAILY
COMMUNITY
Start: 2020-02-24

## 2020-03-12 RX ORDER — FUROSEMIDE 20 MG/1
TABLET ORAL DAILY
COMMUNITY
Start: 2020-02-11 | End: 2020-03-12 | Stop reason: SDUPTHER

## 2020-03-12 NOTE — ASSESSMENT & PLAN NOTE
Characterized by a small stroke events involving the right cerebellum and vermis (July 2018), and felt to be on an embolic basis in the face her chronic atrial fibrillation  Has had no overt symptoms to suggest any recurrent cerebral vascular ischemic events since, but need question given her new issues with evolving memory, plus  --remains on Xarelto combined with an 81 mg aspirin and continues her ongoing follow-up with cardiology  --continues on statin as prescribed through her PCP  --will continue to work with Cardiology and her PCP addressing vascular risk factors

## 2020-03-12 NOTE — PROGRESS NOTES
Patient ID: Young Holder is a [de-identified] y o  female  Assessment/Plan:    Embolic stroke  Characterized by a small stroke events involving the right cerebellum and vermis (July 2018), and felt to be on an embolic basis in the face her chronic atrial fibrillation  Has had no overt symptoms to suggest any recurrent cerebral vascular ischemic events since, but need question given her new issues with evolving memory, plus  --remains on Xarelto combined with an 81 mg aspirin and continues her ongoing follow-up with cardiology  --continues on statin as prescribed through her PCP  --will continue to work with Cardiology and her PCP addressing vascular risk factors  Memory disorder, plus  Clearly with a progressive issue and apparently by history from family a relatively quick progression over the past 8-9 months  Given the results of her her neuro cognitive testing today, I suspect be may well be dealing with a subcortical small vessel origin, with the potential here for accumulating small vessel ischemic plaque burden  However, given the amnestic component and her advanced age, cannot exclude at least the possibility of an overlap with an early evolving cortical based dementia, for example out Alzheimer's disease  Will need to undertake additional study, including screening for any contributing potentially treatable comorbidities  --MRI brain, to screen for additional ischemic events and possible accumulating small vessel cerebrovascular ischemic burden  --routine EEG   --check B12 and folate levels along with Lyme serology (recent TSH normal)  --patient does not drive nor hold an active 's license  --patient currently living in an environment which provides appropriate oversight and supervision  --pending results of her baseline studies, consider potential future role for functional brain imaging with PET/CT  She will follow up after completion of her studies      Subjective:    HPI  Patient, now [de-identified] years of age, with diagnosed chronic atrial fibrillation and pulmonary hypertension, returns today for 2 issues, as follow-up to a stroke event which occurred in July of 2018 and also with a new issue, an evolving problem with memory +  She was accompanied today by her daughter and granddaughter  Patient herself offered no insight into having any ongoing difficulties  As result, the history had to be obtained from the daughter and granddaughter  Apparently, there were subtle issues emerging slowly in the aftermath of her stroke which became much more apparent in the past 8-9 months  She has become progressively more forgetful, with reportedly having difficulties with both new and old memory  She has had some issues with location disorientation  There have been some reported issues to suggest some delusional aspects and patient will at times not recognize her daughter as her daughter  Patient resides with another daughter, granddaughter and great granddaughter  Patient continues to maintain her basic ADLs  She continues to pay bills, but needs direction in guidance in doing so  She has been described as generally in a good mood but is reported angry easily  She is also by history been demonstrating features suggesting difficulties with disinhibition  With regard to her press stroke, felt to be embolic in origin, she has had no symptoms to suggest any overt new cerebral vascular event  Her stroke event involve multiple small ischemic events involving the right cerebellum and vermis and was felt to be on an embolic basis given her atrial fibrillation  She has remained on Xarelto through Cardiology  She is also on a daily 81 mg aspirin and a statin  Patient does not drive nor has she ever held 's license  Recent blood work reviewed:  -CBC with hemoglobin 13 4, hematocrit 39 7, white count 4 7 and platelet count 279   -CMP with creatinine 1 03, AST 21 and ALT 22  -TSH normal 1 15        Past Medical History:   Diagnosis Date    Atrial fibrillation [facility])      Past Surgical History:   Procedure Laterality Date    BREAST SURGERY       Social History     Socioeconomic History    Marital status:       Spouse name: None    Number of children: None    Years of education: None    Highest education level: None   Occupational History    None   Social Needs    Financial resource strain: None    Food insecurity:     Worry: None     Inability: None    Transportation needs:     Medical: None     Non-medical: None   Tobacco Use    Smoking status: Never Smoker    Smokeless tobacco: Never Used   Substance and Sexual Activity    Alcohol use: No    Drug use: No    Sexual activity: None   Lifestyle    Physical activity:     Days per week: None     Minutes per session: None    Stress: None   Relationships    Social connections:     Talks on phone: None     Gets together: None     Attends Confucianist service: None     Active member of club or organization: None     Attends meetings of clubs or organizations: None     Relationship status: None    Intimate partner violence:     Fear of current or ex partner: None     Emotionally abused: None     Physically abused: None     Forced sexual activity: None   Other Topics Concern    None   Social History Narrative    None     Family History   Problem Relation Age of Onset    Parkinsonism Brother     Dementia Brother      No Known Allergies    Current Outpatient Medications:     aspirin (ECOTRIN LOW STRENGTH) 81 mg EC tablet, Take 1 tablet (81 mg total) by mouth daily, Disp: 30 tablet, Rfl: 11    atorvastatin (LIPITOR) 40 mg tablet, Take 1 tablet (40 mg total) by mouth every evening, Disp: 30 tablet, Rfl: 11    furosemide (LASIX) 20 mg tablet, Take 1 tablet (20 mg total) by mouth daily, Disp: 90 tablet, Rfl: 3    Glucosamine Sulfate 500 MG TABS, Take by mouth Daily, Disp: , Rfl:     metoprolol tartrate (LOPRESSOR) 25 mg tablet, Take 25 mg by mouth 2 (two) times a day, Disp: , Rfl:     montelukast (SINGULAIR) 10 mg tablet, Take 10 mg by mouth every evening, Disp: , Rfl: 0    RESVERATROL PO, Take 1,000 mg by mouth daily, Disp: , Rfl:     rivaroxaban (XARELTO) 20 mg tablet, Take 20 mg by mouth daily, Disp: , Rfl:     Tetrahydrozoline HCl (EYE DROPS OP), Apply to eye, Disp: , Rfl:     timolol (TIMOPTIC-XE) 0 5 % ophthalmic gel-forming, , Disp: , Rfl:     Objective:    Blood pressure 110/58, pulse 79, resp  rate 16, height 4' 9 75" (1 467 m), weight 69 1 kg (152 lb 6 4 oz), not currently breastfeeding  Physical Exam  Head normocephalic  Eyes nonicteric  No audible anterior neck bruits  Lungs clear to auscultation  Rhythm irregular  GI (abdomen) soft nontender  Bowel sounds present  No significant lower extremity edema  Neurological Exam  Alert  No dysarthria  Answered correctly when asked name and date of birth  Correctly stated the year, the month and the season but not her location  2/3 object recall  No difficulties with naming or repetition and able to accurately follow a multi step request   Unable to correctly copy intersecting pentagons  For baseline purposes given bedside neuro cognitive testing:  -on a 30 point MMSE patient scored 25 with the median score for her age and 12+ years of formal education being 29   -on an 25 point frontal assessment battery she had difficulty scoring only 12, suggesting either bifrontal or possibly here subcortical dysfunction   -she was 4/4 on clock draw  Gait independent with good length strides and good bilateral spontaneous arm swing  Romberg maneuver performed unremarkably  Cranial Nerves:   I: Olfactory sense intact bilaterally  II: Visual fields full to confrontation  Pupils equal, round, reactive to light with normal accomodation  Fundus: with bilaterally marginated discs  III,IV,VI: Extraocular muscles EOMI, no nystagmus  V: [place] and pterygoid strength   Sensation in the V1 through V3 distributions intact to pinprick and light touch bilaterally  VII:  Left lower facial asymmetry present along with widening of the left palpebra fissure, unchanged from the past   VIII: Audition intact to finger rub bilaterally  IX/X: Uvula midline  Soft palate elevation symmetric  XI: Trapezius and SCM strength 5/5 bilaterally  XII: Tongue midline with no atrophy or fasciculations with appropriate movement  Accurate with finger-to-nose bilaterally  Tone normal   No tremor observed  Good symmetrical strength throughout the 4 extremities  No upper extremity drift  Sensory testing grossly intact to pin and position throughout  Diffusely and symmetrically hyporeflexic, unchanged  Toe response appear downgoing bilaterally  ROS:    Review of Systems   Constitutional: Negative  Negative for appetite change and fever  HENT: Negative  Negative for hearing loss, tinnitus, trouble swallowing and voice change  Eyes: Negative  Negative for photophobia and pain  Respiratory: Negative  Negative for shortness of breath  Cardiovascular: Negative  Negative for palpitations  Gastrointestinal: Negative  Negative for nausea and vomiting  Endocrine: Negative  Negative for cold intolerance and heat intolerance  Genitourinary: Positive for frequency and urgency  Negative for dysuria  Musculoskeletal: Negative  Negative for myalgias and neck pain  Skin: Negative  Negative for rash  Allergic/Immunologic: Negative  Neurological: Negative  Negative for dizziness, tremors, seizures, syncope, facial asymmetry, speech difficulty, weakness, light-headedness, numbness and headaches  Hematological: Bruises/bleeds easily  Psychiatric/Behavioral: Negative  Negative for confusion, hallucinations and sleep disturbance  I personally reviewed the ROS as entered by the medical assistant           *Please note this document was created using voice recognition software and may contain sound-alike word errors  *

## 2020-03-12 NOTE — LETTER
March 12, 2020     Kim Zhou MD  [facility]  [address]    Patient: Daniele Herrera   YOB: 1940   Date of Visit: 3/12/2020       Dear Dr Peoples Deaner:    Thank you for referring Fausto Mcguire to me for evaluation  Below are my notes for this consultation  If you have questions, please do not hesitate to call me  I look forward to following your patient along with you  Sincerely,        Tami Hoyos MD        CC: No Recipients  Tami Hoyos MD  3/12/2020  3:10 PM  Sign at close encounter  Patient ID: Daniele Herrera is a [de-identified] y o  female  Assessment/Plan:    Embolic stroke  Characterized by a small stroke events involving the right cerebellum and vermis (July 2018), and felt to be on an embolic basis in the face her chronic atrial fibrillation  Has had no overt symptoms to suggest any recurrent cerebral vascular ischemic events since, but need question given her new issues with evolving memory, plus  --remains on Xarelto combined with an 81 mg aspirin and continues her ongoing follow-up with cardiology  --continues on statin as prescribed through her PCP  --will continue to work with Cardiology and her PCP addressing vascular risk factors  Memory disorder, plus  Clearly with a progressive issue and apparently by history from family a relatively quick progression over the past 8-9 months  Given the results of her her neuro cognitive testing today, I suspect be may well be dealing with a subcortical small vessel origin, with the potential here for accumulating small vessel ischemic plaque burden  However, given the amnestic component and her advanced age, cannot exclude at least the possibility of an overlap with an early evolving cortical based dementia, for example out Alzheimer's disease  Will need to undertake additional study, including screening for any contributing potentially treatable comorbidities    --MRI brain, to screen for additional ischemic events and possible accumulating small vessel cerebrovascular ischemic burden  --routine EEG   --check B12 and folate levels along with Lyme serology (recent TSH normal)  --patient does not drive nor hold an active 's license  --patient currently living in an environment which provides appropriate oversight and supervision  --pending results of her baseline studies, consider potential future role for functional brain imaging with PET/CT  She will follow up after completion of her studies  Subjective:    HPI  Patient, now [de-identified]years of age, with diagnosed chronic atrial fibrillation and pulmonary hypertension, returns today for 2 issues, as follow-up to a stroke event which occurred in July of 2018 and also with a new issue, an evolving problem with memory +  She was accompanied today by her daughter and granddaughter  Patient herself offered no insight into having any ongoing difficulties  As result, the history had to be obtained from the daughter and granddaughter  Apparently, there were subtle issues emerging slowly in the aftermath of her stroke which became much more apparent in the past 8-9 months  She has become progressively more forgetful, with reportedly having difficulties with both new and old memory  She has had some issues with location disorientation  There have been some reported issues to suggest some delusional aspects and patient will at times not recognize her daughter as her daughter  Patient resides with another daughter, granddaughter and great granddaughter  Patient continues to maintain her basic ADLs  She continues to pay bills, but needs direction in guidance in doing so  She has been described as generally in a good mood but is reported angry easily  She is also by history been demonstrating features suggesting difficulties with disinhibition    With regard to her press stroke, felt to be embolic in origin, she has had no symptoms to suggest any overt new cerebral vascular event  Her stroke event involve multiple small ischemic events involving the right cerebellum and vermis and was felt to be on an embolic basis given her atrial fibrillation  She has remained on Xarelto through Cardiology  She is also on a daily 81 mg aspirin and a statin  Patient does not drive nor has she ever held 's license  Recent blood work reviewed:  -CBC with hemoglobin 13 4, hematocrit 39 7, white count 4 7 and platelet count 700   -CMP with creatinine 1 03, AST 21 and ALT 22  -TSH normal 1 15  Past Medical History:   Diagnosis Date    Atrial fibrillation [facility])      Past Surgical History:   Procedure Laterality Date    BREAST SURGERY       Social History     Socioeconomic History    Marital status:       Spouse name: None    Number of children: None    Years of education: None    Highest education level: None   Occupational History    None   Social Needs    Financial resource strain: None    Food insecurity:     Worry: None     Inability: None    Transportation needs:     Medical: None     Non-medical: None   Tobacco Use    Smoking status: Never Smoker    Smokeless tobacco: Never Used   Substance and Sexual Activity    Alcohol use: No    Drug use: No    Sexual activity: None   Lifestyle    Physical activity:     Days per week: None     Minutes per session: None    Stress: None   Relationships    Social connections:     Talks on phone: None     Gets together: None     Attends Buddhism service: None     Active member of club or organization: None     Attends meetings of clubs or organizations: None     Relationship status: None    Intimate partner violence:     Fear of current or ex partner: None     Emotionally abused: None     Physically abused: None     Forced sexual activity: None   Other Topics Concern    None   Social History Narrative    None     Family History   Problem Relation Age of Onset    Parkinsonism Brother  Dementia Brother      No Known Allergies    Current Outpatient Medications:     aspirin (ECOTRIN LOW STRENGTH) 81 mg EC tablet, Take 1 tablet (81 mg total) by mouth daily, Disp: 30 tablet, Rfl: 11    atorvastatin (LIPITOR) 40 mg tablet, Take 1 tablet (40 mg total) by mouth every evening, Disp: 30 tablet, Rfl: 11    furosemide (LASIX) 20 mg tablet, Take 1 tablet (20 mg total) by mouth daily, Disp: 90 tablet, Rfl: 3    Glucosamine Sulfate 500 MG TABS, Take by mouth Daily, Disp: , Rfl:     metoprolol tartrate (LOPRESSOR) 25 mg tablet, Take 25 mg by mouth 2 (two) times a day, Disp: , Rfl:     montelukast (SINGULAIR) 10 mg tablet, Take 10 mg by mouth every evening, Disp: , Rfl: 0    RESVERATROL PO, Take 1,000 mg by mouth daily, Disp: , Rfl:     rivaroxaban (XARELTO) 20 mg tablet, Take 20 mg by mouth daily, Disp: , Rfl:     Tetrahydrozoline HCl (EYE DROPS OP), Apply to eye, Disp: , Rfl:     timolol (TIMOPTIC-XE) 0 5 % ophthalmic gel-forming, , Disp: , Rfl:     Objective:    Blood pressure 110/58, pulse 79, resp  rate 16, height 4' 9 75" (1 467 m), weight 69 1 kg (152 lb 6 4 oz), not currently breastfeeding  Physical Exam  Head normocephalic  Eyes nonicteric  No audible anterior neck bruits  Lungs clear to auscultation  Rhythm irregular  GI (abdomen) soft nontender  Bowel sounds present  No significant lower extremity edema  Neurological Exam  Alert  No dysarthria  Answered correctly when asked name and date of birth  Correctly stated the year, the month and the season but not her location  2/3 object recall  No difficulties with naming or repetition and able to accurately follow a multi step request   Unable to correctly copy intersecting pentagons    For baseline purposes given bedside neuro cognitive testing:  -on a 30 point MMSE patient scored 25 with the median score for her age and 12+ years of formal education being 29   -on an 25 point frontal assessment battery she had difficulty scoring only 12, suggesting either bifrontal or possibly here subcortical dysfunction   -she was 4/4 on clock draw  Gait independent with good length strides and good bilateral spontaneous arm swing  Romberg maneuver performed unremarkably  Cranial Nerves:   I: Olfactory sense intact bilaterally  II: Visual fields full to confrontation  Pupils equal, round, reactive to light with normal accomodation  Fundus: with bilaterally marginated discs  III,IV,VI: Extraocular muscles EOMI, no nystagmus  V: [place] and pterygoid strength  Sensation in the V1 through V3 distributions intact to pinprick and light touch bilaterally  VII:  Left lower facial asymmetry present along with widening of the left palpebra fissure, unchanged from the past   VIII: Audition intact to finger rub bilaterally  IX/X: Uvula midline  Soft palate elevation symmetric  XI: Trapezius and SCM strength 5/5 bilaterally  XII: Tongue midline with no atrophy or fasciculations with appropriate movement  Accurate with finger-to-nose bilaterally  Tone normal   No tremor observed  Good symmetrical strength throughout the 4 extremities  No upper extremity drift  Sensory testing grossly intact to pin and position throughout  Diffusely and symmetrically hyporeflexic, unchanged  Toe response appear downgoing bilaterally  ROS:    Review of Systems   Constitutional: Negative  Negative for appetite change and fever  HENT: Negative  Negative for hearing loss, tinnitus, trouble swallowing and voice change  Eyes: Negative  Negative for photophobia and pain  Respiratory: Negative  Negative for shortness of breath  Cardiovascular: Negative  Negative for palpitations  Gastrointestinal: Negative  Negative for nausea and vomiting  Endocrine: Negative  Negative for cold intolerance and heat intolerance  Genitourinary: Positive for frequency and urgency  Negative for dysuria  Musculoskeletal: Negative    Negative for myalgias and neck pain  Skin: Negative  Negative for rash  Allergic/Immunologic: Negative  Neurological: Negative  Negative for dizziness, tremors, seizures, syncope, facial asymmetry, speech difficulty, weakness, light-headedness, numbness and headaches  Hematological: Bruises/bleeds easily  Psychiatric/Behavioral: Negative  Negative for confusion, hallucinations and sleep disturbance  I personally reviewed the ROS as entered by the medical assistant  *Please note this document was created using voice recognition software and may contain sound-alike word errors  *

## 2020-03-12 NOTE — ASSESSMENT & PLAN NOTE
Clearly with a progressive issue and apparently by history from family a relatively quick progression over the past 8-9 months  Given the results of her her neuro cognitive testing today, I suspect be may well be dealing with a subcortical small vessel origin, with the potential here for accumulating small vessel ischemic plaque burden  However, given the amnestic component and her advanced age, cannot exclude at least the possibility of an overlap with an early evolving cortical based dementia, for example out Alzheimer's disease  Will need to undertake additional study, including screening for any contributing potentially treatable comorbidities  --MRI brain, to screen for additional ischemic events and possible accumulating small vessel cerebrovascular ischemic burden  --routine EEG   --check B12 and folate levels along with Lyme serology (recent TSH normal)  --patient does not drive nor hold an active 's license  --patient currently living in an environment which provides appropriate oversight and supervision  --pending results of her baseline studies, consider potential future role for functional brain imaging with PET/CT

## 2020-03-26 ENCOUNTER — TELEPHONE (OUTPATIENT)
Dept: RADIOLOGY | Facility: HOSPITAL | Age: 80
End: 2020-03-26

## 2020-03-26 NOTE — TELEPHONE ENCOUNTER
Spoke with pt and she will discuss this with her ride "Crista Beck"   PTs to call the [phone] option 2 to reschedule if decided

## 2020-04-24 ENCOUNTER — HOSPITAL ENCOUNTER (OUTPATIENT)
Dept: MRI IMAGING | Facility: HOSPITAL | Age: 80
Discharge: HOME | End: 2020-04-24
Attending: PSYCHIATRY & NEUROLOGY
Payer: COMMERCIAL

## 2020-04-24 DIAGNOSIS — R41.3 AMNESIA/MEMORY DISORDER: ICD-10-CM

## 2020-04-24 PROCEDURE — 70551 MRI BRAIN STEM W/O DYE: CPT

## 2020-06-08 ENCOUNTER — HOSPITAL ENCOUNTER (OUTPATIENT)
Dept: NEUROLOGY | Facility: CLINIC | Age: 80
Discharge: HOME | End: 2020-06-08
Payer: COMMERCIAL

## 2020-06-08 DIAGNOSIS — R41.3 AMNESIA/MEMORY DISORDER: ICD-10-CM

## 2020-06-08 PROCEDURE — 95816 EEG AWAKE AND DROWSY: CPT

## 2020-06-08 PROCEDURE — 95819 EEG AWAKE AND ASLEEP: CPT | Performed by: PSYCHIATRY & NEUROLOGY

## 2020-06-12 ENCOUNTER — TELEPHONE (OUTPATIENT)
Dept: NEUROLOGY | Facility: CLINIC | Age: 80
End: 2020-06-12

## 2020-06-15 ENCOUNTER — OFFICE VISIT (OUTPATIENT)
Dept: NEUROLOGY | Facility: CLINIC | Age: 80
End: 2020-06-15
Payer: COMMERCIAL

## 2020-06-15 VITALS
BODY MASS INDEX: 29.8 KG/M2 | WEIGHT: 147.8 LBS | RESPIRATION RATE: 16 BRPM | DIASTOLIC BLOOD PRESSURE: 71 MMHG | SYSTOLIC BLOOD PRESSURE: 133 MMHG | HEIGHT: 59 IN | TEMPERATURE: 97.6 F | HEART RATE: 71 BPM

## 2020-06-15 DIAGNOSIS — R41.3 AMNESIA/MEMORY DISORDER: Primary | ICD-10-CM

## 2020-06-15 PROCEDURE — 99214 OFFICE O/P EST MOD 30 MIN: CPT | Performed by: PSYCHIATRY & NEUROLOGY

## 2020-06-24 ENCOUNTER — HOSPITAL ENCOUNTER (OUTPATIENT)
Dept: RADIOLOGY | Age: 80
Discharge: HOME | End: 2020-06-24
Payer: COMMERCIAL

## 2020-06-24 DIAGNOSIS — R41.3 AMNESIA/MEMORY DISORDER: ICD-10-CM

## 2020-06-24 LAB — GLUCOSE SERPL-MCNC: 94 MG/DL (ref 65–140)

## 2020-06-24 PROCEDURE — A9552 F18 FDG: HCPCS

## 2020-06-24 PROCEDURE — 78608 BRAIN IMAGING (PET): CPT

## 2020-06-24 PROCEDURE — 82948 REAGENT STRIP/BLOOD GLUCOSE: CPT

## 2020-07-01 ENCOUNTER — OFFICE VISIT (OUTPATIENT)
Dept: NEUROLOGY | Facility: CLINIC | Age: 80
End: 2020-07-01
Payer: COMMERCIAL

## 2020-07-01 VITALS
SYSTOLIC BLOOD PRESSURE: 125 MMHG | TEMPERATURE: 98.1 F | HEIGHT: 59 IN | RESPIRATION RATE: 14 BRPM | BODY MASS INDEX: 28.59 KG/M2 | DIASTOLIC BLOOD PRESSURE: 72 MMHG | WEIGHT: 141.8 LBS | HEART RATE: 92 BPM

## 2020-07-01 DIAGNOSIS — R41.3 AMNESIA/MEMORY DISORDER: Primary | ICD-10-CM

## 2020-07-01 PROCEDURE — 99214 OFFICE O/P EST MOD 30 MIN: CPT | Performed by: PSYCHIATRY & NEUROLOGY

## 2020-07-01 RX ORDER — DONEPEZIL HYDROCHLORIDE 5 MG/1
TABLET, FILM COATED ORAL
Qty: 30 TABLET | Refills: 1 | Status: SHIPPED | OUTPATIENT
Start: 2020-07-01 | End: 2020-07-23 | Stop reason: DRUGHIGH

## 2020-07-01 NOTE — ASSESSMENT & PLAN NOTE
Patient with a recent significant progression of memory and cognitive difficulties  Initial thought was that it may well be on a cerebrovascular basis given her known past stroke  However, follow-up brain MRI has demonstrated only very mild microangiopathic change  In view of that, given the patient's advanced age and very significant amnestic component (short-term memory) Alzheimer's was felt to be a potential origin for her issues  Functional brain imaging was undertaken to further assess  The PET/CT scan demonstrated labeling abnormalities suggesting an underlying Alzheimer's disease  --I reviewed with patient and daughter the now provisional diagnosis of Alzheimer's disease, the potential for progression over time, and the role of medications  With regard to medications we talked about the fact that the medicines are not neuroprotective but only provide a temporary symptomatic benefit and progression over time is to be expected  --we have chosen donepezil as a start  She will begin with 5 mg each morning  Potential side effects and specifically to GI side effects were reviewed with patient and daughter  --we also reviewed the home situation  Patient currently lives with extended family  Presently with no safety issues  However, it would be nice to have patient more socially active during the day is a when everyone is out working  Daughter was advised to contact the [facility] agency on Aging and Adult Services regarding available [employer] (, Meals on wheels, etc)  --daughter Ruperto Gonzales was asked to call the office in 1 month with a status report as to how patient is doing on the donepezil and to discuss the possibility of advancing that dose to a full 10 mg daily  She was instructed to call before then should she have any questions, concerns or should patient have any difficulties tolerating the donepezil

## 2020-07-01 NOTE — LETTER
July 1, 2020     Edwina Lipscomb MD  [facility]  [address]    Patient: Lio Rodrigues   YOB: 1940   Date of Visit: 7/1/2020       Dear Dr Sharonda Kumar:    Thank you for referring Jackson Laird to me for evaluation  Below are my notes for this consultation  If you have questions, please do not hesitate to call me  I look forward to following your patient along with you  Sincerely,        Myah Merritt MD        CC: No Recipients  Myah Merritt MD  7/1/2020 10:43 AM  Sign at close encounter  Patient ID: Lio Rodrigues is a [de-identified] y o  female  Assessment/Plan:    Memory disorder, plus  Patient with a recent significant progression of memory and cognitive difficulties  Initial thought was that it may well be on a cerebrovascular basis given her known past stroke  However, follow-up brain MRI has demonstrated only very mild microangiopathic change  In view of that, given the patient's advanced age and very significant amnestic component (short-term memory) Alzheimer's was felt to be a potential origin for her issues  Functional brain imaging was undertaken to further assess  The PET/CT scan demonstrated labeling abnormalities suggesting an underlying Alzheimer's disease  --I reviewed with patient and daughter the now provisional diagnosis of Alzheimer's disease, the potential for progression over time, and the role of medications  With regard to medications we talked about the fact that the medicines are not neuroprotective but only provide a temporary symptomatic benefit and progression over time is to be expected  --we have chosen donepezil as a start  She will begin with 5 mg each morning  Potential side effects and specifically to GI side effects were reviewed with patient and daughter  --we also reviewed the home situation  Patient currently lives with extended family  Presently with no safety issues    However, it would be nice to have patient more socially active during the day is a when everyone is out working  Daughter was advised to contact the [facility] agency on Aging and Adult Services regarding available [employer] (, Meals on wheels, etc)  --daughter Annika Herron was asked to call the office in 1 month with a status report as to how patient is doing on the donepezil and to discuss the possibility of advancing that dose to a full 10 mg daily  She was instructed to call before then should she have any questions, concerns or should patient have any difficulties tolerating the donepezil  I spent a total of 25 min with the patient with greater than 50% of that time spent counseling and coordinating her care, specifically discussing her diagnosis, potential for progression over time, testing results along with the role of medications, and discussing the case with her daughter Annika Herron, as detailed above  She will follow up in 3 months  Subjective:    HPI  Patient, [de-identified]years of age, with additional diagnoses of chronic atrial fibrillation and pulmonary hypertension, returns today with her daughter, Annika Herron, to review the results of her PET/CT in light of a history of declining memory  As per Annika Herron, patient has since last seen only a few weeks ago not demonstrated any further decline in her general memory or cognitive status  She continues to maintain her basic ADLs  Mood remains good  No behavioral issues reported  Patient continues to reside with extended family  She does have time alone at home, but at this point in time there appears to be no associated safety issues  Patient does not drive nor hold an active 's license  Annika Herron has not yet contacted the agency on Aging but is encouraged to do so to review possible available [employer] including  and Meals on wheels    The patient does have a past history of embolic stroke and the thought was initially that her memory issues may be on the basis of cerebrovascular disease  However, follow-up brain MRI demonstrated only very mild microangiopathic change  Given the strong amnestic feature and her advanced age, Alzheimer's was a definite consideration  This prompted the performance of her PET/CT scan which demonstrated labeling abnormalities felt suggestive of Alzheimer's disease  Past Medical History:   Diagnosis Date    Atrial fibrillation [facility])      Past Surgical History:   Procedure Laterality Date    BREAST SURGERY       Social History     Socioeconomic History    Marital status:       Spouse name: None    Number of children: None    Years of education: None    Highest education level: None   Occupational History    None   Social Needs    Financial resource strain: None    Food insecurity:     Worry: None     Inability: None    Transportation needs:     Medical: None     Non-medical: None   Tobacco Use    Smoking status: Never Smoker    Smokeless tobacco: Never Used   Substance and Sexual Activity    Alcohol use: No    Drug use: No    Sexual activity: None   Lifestyle    Physical activity:     Days per week: None     Minutes per session: None    Stress: None   Relationships    Social connections:     Talks on phone: None     Gets together: None     Attends Cheondoism service: None     Active member of club or organization: None     Attends meetings of clubs or organizations: None     Relationship status: None    Intimate partner violence:     Fear of current or ex partner: None     Emotionally abused: None     Physically abused: None     Forced sexual activity: None   Other Topics Concern    None   Social History Narrative    None     Family History   Problem Relation Age of Onset    Parkinsonism Brother     Dementia Brother      No Known Allergies    Current Outpatient Medications:     aspirin (ECOTRIN LOW STRENGTH) 81 mg EC tablet, Take 1 tablet (81 mg total) by mouth daily, Disp: 30 tablet, Rfl: 11   atorvastatin (LIPITOR) 40 mg tablet, Take 1 tablet (40 mg total) by mouth every evening, Disp: 30 tablet, Rfl: 11    furosemide (LASIX) 20 mg tablet, Take 1 tablet (20 mg total) by mouth daily, Disp: 90 tablet, Rfl: 3    Glucosamine Sulfate 500 MG TABS, Take by mouth Daily, Disp: , Rfl:     metoprolol tartrate (LOPRESSOR) 25 mg tablet, Take 25 mg by mouth 2 (two) times a day, Disp: , Rfl:     montelukast (SINGULAIR) 10 mg tablet, Take 10 mg by mouth every evening, Disp: , Rfl: 0    RESVERATROL PO, Take 1,000 mg by mouth daily, Disp: , Rfl:     rivaroxaban (XARELTO) 20 mg tablet, Take 20 mg by mouth daily, Disp: , Rfl:     Tetrahydrozoline HCl (EYE DROPS OP), Apply to eye, Disp: , Rfl:     timolol (TIMOPTIC-XE) 0 5 % ophthalmic gel-forming, , Disp: , Rfl:     donepezil (ARICEPT) 5 mg tablet, By mouth take 1 tablet daily in the morning, Disp: 30 tablet, Rfl: 1    Objective:    Blood pressure 125/72, pulse 92, temperature 98 1 °F (36 7 °C), resp  rate 14, height 4' 11" (1 499 m), weight 64 3 kg (141 lb 12 8 oz), not currently breastfeeding  Physical Exam  Head normocephalic  Eyes nonicteric, with evidence of past cataract surgeries bilaterally  Lungs clear to auscultation  Rhythm regular  GI (abdomen) soft nontender with bowel sounds present  Mild bilateral lower extremity edema noted  Neurological Exam  Alert  Once again pleasantly interactive  Answered correctly when asked her name  Answered correctly when asked the year and the month  MMSE not administered today as was just done on her last appointment in mid June  On that occasion she scored 22/30 (including 0/3 object recall) which is a 3 point decline in comparison to her score of 3 months earlier  Gait independent  Romberg maneuver performed unremarkably    Cranial nerves 2-12 tested and grossly intact except for evidence of past cataract surgeries bilaterally as well as a left facial asymmetry, unchanged from the past   Accurate with finger-to-nose bilaterally  Tone normal   No tremor observed  Good symmetrical strength throughout the 4 extremities  Remains diffusely and symmetrically hyporeflexic  ROS:    Review of Systems   Constitutional: Positive for appetite change (poor appetite)  Negative for fever  HENT: Negative  Negative for hearing loss, tinnitus, trouble swallowing and voice change  Eyes: Negative  Negative for photophobia and pain  Respiratory: Negative  Negative for shortness of breath  Cardiovascular: Negative  Negative for palpitations  Gastrointestinal: Negative  Negative for nausea and vomiting  Endocrine: Negative  Negative for cold intolerance  Genitourinary: Positive for frequency and urgency  Negative for dysuria  Musculoskeletal: Negative  Negative for myalgias and neck pain  Skin: Negative  Negative for rash  Neurological: Negative  Negative for dizziness, tremors, seizures, syncope, facial asymmetry, speech difficulty, weakness, light-headedness, numbness and headaches  Hematological: Bruises/bleeds easily  Psychiatric/Behavioral: Positive for confusion  Negative for hallucinations and sleep disturbance  The patient is nervous/anxious  Memory issues       I personally reviewed the ROS as entered by the medical assistant  *Please note this document was created using voice recognition software and may contain sound-alike word errors  *

## 2020-07-01 NOTE — PROGRESS NOTES
Patient ID: Francesco Tariq is a [de-identified] y o  female  Assessment/Plan:    Memory disorder, plus  Patient with a recent significant progression of memory and cognitive difficulties  Initial thought was that it may well be on a cerebrovascular basis given her known past stroke  However, follow-up brain MRI has demonstrated only very mild microangiopathic change  In view of that, given the patient's advanced age and very significant amnestic component (short-term memory) Alzheimer's was felt to be a potential origin for her issues  Functional brain imaging was undertaken to further assess  The PET/CT scan demonstrated labeling abnormalities suggesting an underlying Alzheimer's disease  --I reviewed with patient and daughter the now provisional diagnosis of Alzheimer's disease, the potential for progression over time, and the role of medications  With regard to medications we talked about the fact that the medicines are not neuroprotective but only provide a temporary symptomatic benefit and progression over time is to be expected  --we have chosen donepezil as a start  She will begin with 5 mg each morning  Potential side effects and specifically to GI side effects were reviewed with patient and daughter  --we also reviewed the home situation  Patient currently lives with extended family  Presently with no safety issues  However, it would be nice to have patient more socially active during the day is a when everyone is out working  Daughter was advised to contact the [facility] agency on Aging and Adult Services regarding available [employer] (, Meals on wheels, etc)  --daughter Garrie Fothergill was asked to call the office in 1 month with a status report as to how patient is doing on the donepezil and to discuss the possibility of advancing that dose to a full 10 mg daily    She was instructed to call before then should she have any questions, concerns or should patient have any difficulties tolerating the donepezil  I spent a total of 25 min with the patient with greater than 50% of that time spent counseling and coordinating her care, specifically discussing her diagnosis, potential for progression over time, testing results along with the role of medications, and discussing the case with her daughter Glenys Ocampo, as detailed above  She will follow up in 3 months  Subjective:    HPI  Patient, [de-identified]years of age, with additional diagnoses of chronic atrial fibrillation and pulmonary hypertension, returns today with her daughter, Glenys Ocampo, to review the results of her PET/CT in light of a history of declining memory  As per Glenys Ocampo, patient has since last seen only a few weeks ago not demonstrated any further decline in her general memory or cognitive status  She continues to maintain her basic ADLs  Mood remains good  No behavioral issues reported  Patient continues to reside with extended family  She does have time alone at home, but at this point in time there appears to be no associated safety issues  Patient does not drive nor hold an active 's license  Glenys Ocampo has not yet contacted the agency on Aging but is encouraged to do so to review possible available [employer] including  and Meals on wheels  The patient does have a past history of embolic stroke and the thought was initially that her memory issues may be on the basis of cerebrovascular disease  However, follow-up brain MRI demonstrated only very mild microangiopathic change  Given the strong amnestic feature and her advanced age, Alzheimer's was a definite consideration  This prompted the performance of her PET/CT scan which demonstrated labeling abnormalities felt suggestive of Alzheimer's disease      Past Medical History:   Diagnosis Date    Atrial fibrillation [facility])      Past Surgical History:   Procedure Laterality Date    BREAST SURGERY       Social History     Socioeconomic History    Marital status:       Spouse name: None    Number of children: None    Years of education: None    Highest education level: None   Occupational History    None   Social Needs    Financial resource strain: None    Food insecurity:     Worry: None     Inability: None    Transportation needs:     Medical: None     Non-medical: None   Tobacco Use    Smoking status: Never Smoker    Smokeless tobacco: Never Used   Substance and Sexual Activity    Alcohol use: No    Drug use: No    Sexual activity: None   Lifestyle    Physical activity:     Days per week: None     Minutes per session: None    Stress: None   Relationships    Social connections:     Talks on phone: None     Gets together: None     Attends Church service: None     Active member of club or organization: None     Attends meetings of clubs or organizations: None     Relationship status: None    Intimate partner violence:     Fear of current or ex partner: None     Emotionally abused: None     Physically abused: None     Forced sexual activity: None   Other Topics Concern    None   Social History Narrative    None     Family History   Problem Relation Age of Onset    Parkinsonism Brother     Dementia Brother      No Known Allergies    Current Outpatient Medications:     aspirin (ECOTRIN LOW STRENGTH) 81 mg EC tablet, Take 1 tablet (81 mg total) by mouth daily, Disp: 30 tablet, Rfl: 11    atorvastatin (LIPITOR) 40 mg tablet, Take 1 tablet (40 mg total) by mouth every evening, Disp: 30 tablet, Rfl: 11    furosemide (LASIX) 20 mg tablet, Take 1 tablet (20 mg total) by mouth daily, Disp: 90 tablet, Rfl: 3    Glucosamine Sulfate 500 MG TABS, Take by mouth Daily, Disp: , Rfl:     metoprolol tartrate (LOPRESSOR) 25 mg tablet, Take 25 mg by mouth 2 (two) times a day, Disp: , Rfl:     montelukast (SINGULAIR) 10 mg tablet, Take 10 mg by mouth every evening, Disp: , Rfl: 0    RESVERATROL PO, Take 1,000 mg by mouth daily, Disp: , Rfl:    rivaroxaban (XARELTO) 20 mg tablet, Take 20 mg by mouth daily, Disp: , Rfl:     Tetrahydrozoline HCl (EYE DROPS OP), Apply to eye, Disp: , Rfl:     timolol (TIMOPTIC-XE) 0 5 % ophthalmic gel-forming, , Disp: , Rfl:     donepezil (ARICEPT) 5 mg tablet, By mouth take 1 tablet daily in the morning, Disp: 30 tablet, Rfl: 1    Objective:    Blood pressure 125/72, pulse 92, temperature 98 1 °F (36 7 °C), resp  rate 14, height 4' 11" (1 499 m), weight 64 3 kg (141 lb 12 8 oz), not currently breastfeeding  Physical Exam  Head normocephalic  Eyes nonicteric, with evidence of past cataract surgeries bilaterally  Lungs clear to auscultation  Rhythm regular  GI (abdomen) soft nontender with bowel sounds present  Mild bilateral lower extremity edema noted  Neurological Exam  Alert  Once again pleasantly interactive  Answered correctly when asked her name  Answered correctly when asked the year and the month  MMSE not administered today as was just done on her last appointment in mid June  On that occasion she scored 22/30 (including 0/3 object recall) which is a 3 point decline in comparison to her score of 3 months earlier  Gait independent  Romberg maneuver performed unremarkably  Cranial nerves 2-12 tested and grossly intact except for evidence of past cataract surgeries bilaterally as well as a left facial asymmetry, unchanged from the past   Accurate with finger-to-nose bilaterally  Tone normal   No tremor observed  Good symmetrical strength throughout the 4 extremities  Remains diffusely and symmetrically hyporeflexic  ROS:    Review of Systems   Constitutional: Positive for appetite change (poor appetite)  Negative for fever  HENT: Negative  Negative for hearing loss, tinnitus, trouble swallowing and voice change  Eyes: Negative  Negative for photophobia and pain  Respiratory: Negative  Negative for shortness of breath  Cardiovascular: Negative  Negative for palpitations  Gastrointestinal: Negative  Negative for nausea and vomiting  Endocrine: Negative  Negative for cold intolerance  Genitourinary: Positive for frequency and urgency  Negative for dysuria  Musculoskeletal: Negative  Negative for myalgias and neck pain  Skin: Negative  Negative for rash  Neurological: Negative  Negative for dizziness, tremors, seizures, syncope, facial asymmetry, speech difficulty, weakness, light-headedness, numbness and headaches  Hematological: Bruises/bleeds easily  Psychiatric/Behavioral: Positive for confusion  Negative for hallucinations and sleep disturbance  The patient is nervous/anxious  Memory issues       I personally reviewed the ROS as entered by the medical assistant  *Please note this document was created using voice recognition software and may contain sound-alike word errors  *

## 2020-07-01 NOTE — PATIENT INSTRUCTIONS
Start donepezil 5 mg tablets taking 1 tablet each morning  Call the office in 1 month with a status report and to discuss possible increase in the donepezil schedule  Call before then if there are any questions or concerns or difficulties tolerating the donepezil  Please contact the [facility] agency on Aging and Adult Services to review available [employer] including  and Meals on Wheels

## 2020-07-23 DIAGNOSIS — R41.3 AMNESIA/MEMORY DISORDER: Primary | ICD-10-CM

## 2020-07-23 DIAGNOSIS — R41.3 AMNESIA/MEMORY DISORDER: ICD-10-CM

## 2020-07-23 RX ORDER — DONEPEZIL HYDROCHLORIDE 10 MG/1
TABLET, FILM COATED ORAL
Qty: 30 TABLET | Refills: 5 | Status: SHIPPED | OUTPATIENT
Start: 2020-07-23 | End: 2021-01-18

## 2020-07-23 RX ORDER — DONEPEZIL HYDROCHLORIDE 5 MG/1
TABLET, FILM COATED ORAL
Qty: 30 TABLET | Refills: 1 | OUTPATIENT
Start: 2020-07-23

## 2020-07-23 NOTE — TELEPHONE ENCOUNTER
Before reordering the 5 mg donepezil please check with patient's family  If she is having no side effects from the the 5 mg donepezil  daily would like to increase to a full 10 mg daily dose

## 2020-07-23 NOTE — TELEPHONE ENCOUNTER
Telephone call to patients daughter  She has not seen any side effects or changes with the patient being on the 5 mg of the Donepezil  I informed Karla Medrano that you would like to increase the patients Donepezil to 10 mg daily and she is in agreement with the increase to 10 mg   I informed Karla Medrano to call if there are any issues or concerns with the increase dose

## 2020-09-25 ENCOUNTER — TELEPHONE (OUTPATIENT)
Dept: NEUROLOGY | Facility: CLINIC | Age: 80
End: 2020-09-25

## 2020-09-29 ENCOUNTER — TELEPHONE (OUTPATIENT)
Dept: NEUROLOGY | Facility: CLINIC | Age: 80
End: 2020-09-29

## 2020-09-29 NOTE — TELEPHONE ENCOUNTER
L/m on v/m to confirm appt with Dr Layla Villela on 10/1/20 @ 12:30 pm  Also is patient living at home or in a facility  Requested c/b from patient to confirm the patient will be keeping the appointment

## 2020-10-01 ENCOUNTER — OFFICE VISIT (OUTPATIENT)
Dept: NEUROLOGY | Facility: CLINIC | Age: 80
End: 2020-10-01
Payer: COMMERCIAL

## 2020-10-01 VITALS
SYSTOLIC BLOOD PRESSURE: 118 MMHG | WEIGHT: 136.2 LBS | DIASTOLIC BLOOD PRESSURE: 59 MMHG | RESPIRATION RATE: 16 BRPM | HEART RATE: 84 BPM | TEMPERATURE: 97.1 F | HEIGHT: 58 IN | BODY MASS INDEX: 28.59 KG/M2

## 2020-10-01 DIAGNOSIS — F02.80 LATE ONSET ALZHEIMER'S DISEASE WITHOUT BEHAVIORAL DISTURBANCE (HCC): Primary | ICD-10-CM

## 2020-10-01 DIAGNOSIS — G30.1 LATE ONSET ALZHEIMER'S DISEASE WITHOUT BEHAVIORAL DISTURBANCE (HCC): Primary | ICD-10-CM

## 2020-10-01 PROCEDURE — 99214 OFFICE O/P EST MOD 30 MIN: CPT | Performed by: PSYCHIATRY & NEUROLOGY

## 2020-10-01 RX ORDER — ESCITALOPRAM OXALATE 5 MG/1
TABLET ORAL
COMMUNITY
Start: 2020-08-11

## 2020-10-01 RX ORDER — MEMANTINE HYDROCHLORIDE 5 MG/1
TABLET ORAL
Qty: 60 TABLET | Refills: 5 | Status: SHIPPED | OUTPATIENT
Start: 2020-10-01 | End: 2021-02-11

## 2020-12-25 ENCOUNTER — APPOINTMENT (EMERGENCY)
Dept: CT IMAGING | Facility: HOSPITAL | Age: 80
End: 2020-12-25
Payer: COMMERCIAL

## 2020-12-25 ENCOUNTER — HOSPITAL ENCOUNTER (EMERGENCY)
Facility: HOSPITAL | Age: 80
Discharge: HOME | End: 2020-12-25
Attending: EMERGENCY MEDICINE | Admitting: EMERGENCY MEDICINE
Payer: COMMERCIAL

## 2020-12-25 VITALS
HEART RATE: 71 BPM | WEIGHT: 132.28 LBS | SYSTOLIC BLOOD PRESSURE: 119 MMHG | DIASTOLIC BLOOD PRESSURE: 66 MMHG | OXYGEN SATURATION: 98 % | BODY MASS INDEX: 27.65 KG/M2 | RESPIRATION RATE: 18 BRPM

## 2020-12-25 DIAGNOSIS — H57.89 PERIORBITAL SWELLING: ICD-10-CM
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
## 2020-12-25 DIAGNOSIS — S09.90XA INJURY OF HEAD, INITIAL ENCOUNTER: ICD-10-CM

## 2020-12-25 PROCEDURE — 70450 CT HEAD/BRAIN W/O DYE: CPT

## 2020-12-25 PROCEDURE — 70486 CT MAXILLOFACIAL W/O DYE: CPT

## 2020-12-25 PROCEDURE — 99284 EMERGENCY DEPT VISIT MOD MDM: CPT

## 2020-12-25 PROCEDURE — 99282 EMERGENCY DEPT VISIT SF MDM: CPT | Performed by: PHYSICIAN ASSISTANT

## 2020-12-25 RX ORDER — ACETAMINOPHEN 325 MG/1
650 TABLET ORAL ONCE
Status: COMPLETED | OUTPATIENT
Start: 2020-12-25 | End: 2020-12-25

## 2020-12-25 RX ADMIN — ACETAMINOPHEN 650 MG: 325 TABLET ORAL at 11:05

## 2020-12-30 ENCOUNTER — TELEPHONE (OUTPATIENT)
Dept: NEUROLOGY | Facility: CLINIC | Age: 80
End: 2020-12-30

## 2021-01-18 DIAGNOSIS — R41.3 AMNESIA/MEMORY DISORDER: ICD-10-CM

## 2021-01-18 RX ORDER — DONEPEZIL HYDROCHLORIDE 10 MG/1
TABLET, FILM COATED ORAL
Qty: 90 TABLET | Refills: 0 | Status: SHIPPED | OUTPATIENT
Start: 2021-01-18 | End: 2021-02-11

## 2021-01-21 DIAGNOSIS — Z11.52 ENCOUNTER FOR SCREENING FOR COVID-19: ICD-10-CM

## 2021-01-21 PROCEDURE — U0005 INFEC AGEN DETEC AMPLI PROBE: HCPCS | Performed by: FAMILY MEDICINE

## 2021-01-21 PROCEDURE — U0003 INFECTIOUS AGENT DETECTION BY NUCLEIC ACID (DNA OR RNA); SEVERE ACUTE RESPIRATORY SYNDROME CORONAVIRUS 2 (SARS-COV-2) (CORONAVIRUS DISEASE [COVID-19]), AMPLIFIED PROBE TECHNIQUE, MAKING USE OF HIGH THROUGHPUT TECHNOLOGIES AS DESCRIBED BY CMS-2020-01-R: HCPCS | Performed by: FAMILY MEDICINE

## 2021-01-22 LAB — SARS-COV-2 RNA RESP QL NAA+PROBE: NEGATIVE

## 2021-02-05 ENCOUNTER — APPOINTMENT (EMERGENCY)
Dept: RADIOLOGY | Facility: HOSPITAL | Age: 81
End: 2021-02-05
Payer: COMMERCIAL

## 2021-02-05 ENCOUNTER — APPOINTMENT (EMERGENCY)
Dept: CT IMAGING | Facility: HOSPITAL | Age: 81
End: 2021-02-05
Payer: COMMERCIAL

## 2021-02-05 ENCOUNTER — HOSPITAL ENCOUNTER (EMERGENCY)
Facility: HOSPITAL | Age: 81
Discharge: HOME | End: 2021-02-06
Attending: EMERGENCY MEDICINE
Payer: COMMERCIAL

## 2021-02-05 VITALS
RESPIRATION RATE: 18 BRPM | SYSTOLIC BLOOD PRESSURE: 117 MMHG | TEMPERATURE: 98.6 F | HEART RATE: 79 BPM | DIASTOLIC BLOOD PRESSURE: 57 MMHG | OXYGEN SATURATION: 98 %

## 2021-02-05 DIAGNOSIS — S00.81XA ABRASION OF FACE, INITIAL ENCOUNTER: ICD-10-CM

## 2021-02-05 DIAGNOSIS — M25.562 ACUTE PAIN OF LEFT KNEE: ICD-10-CM

## 2021-02-05 DIAGNOSIS — S40.011A TRAUMATIC HEMATOMA OF RIGHT SHOULDER, INITIAL ENCOUNTER: ICD-10-CM

## 2021-02-05 DIAGNOSIS — S09.90XA INJURY OF HEAD, INITIAL ENCOUNTER: ICD-10-CM
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
## 2021-02-05 PROCEDURE — 73060 X-RAY EXAM OF HUMERUS: CPT

## 2021-02-05 PROCEDURE — 99284 EMERGENCY DEPT VISIT MOD MDM: CPT | Performed by: PHYSICIAN ASSISTANT

## 2021-02-05 PROCEDURE — 70450 CT HEAD/BRAIN W/O DYE: CPT

## 2021-02-05 PROCEDURE — 73030 X-RAY EXAM OF SHOULDER: CPT

## 2021-02-05 PROCEDURE — 73564 X-RAY EXAM KNEE 4 OR MORE: CPT

## 2021-02-05 PROCEDURE — 99284 EMERGENCY DEPT VISIT MOD MDM: CPT

## 2021-02-05 PROCEDURE — 70486 CT MAXILLOFACIAL W/O DYE: CPT

## 2021-02-05 RX ORDER — BACITRACIN, NEOMYCIN, POLYMYXIN B 400; 3.5; 5 [USP'U]/G; MG/G; [USP'U]/G
1 OINTMENT TOPICAL ONCE
Status: COMPLETED | OUTPATIENT
Start: 2021-02-05 | End: 2021-02-06

## 2021-02-05 NOTE — ED PROVIDER NOTES
History  Chief Complaint   Patient presents with    Fall     Pt  brought in via EMS  Pt  slipped on rug and fell forward straking her face  Pt  denies LOC but reports thinners  Patient is an 80 y/o female with hx of Afib (on Xarelto and ASA), presents to the ED via EMS for evaluation of mechanical fall  Patient is from [facility], patient states PTA she was called for dinner when she was walking down the hallway and tripped on the carpeting, falling onto her face and knees  Pt denies LOC  Pt denies chest pain, SOB or dizziness prior to fall  Pt was helped up by other residents and EMS was called  Pt sustained abrasion to right forehead  Pt also with bruising to right shoulder and left knee  Pt denies chest pain, SOB, headache, vision changes, focal weakness, sensory deficit, abdominal pain, nausea, vomiting  Prior to Admission Medications   Prescriptions Last Dose Informant Patient Reported? Taking? Glucosamine Sulfate 500 MG TABS   Yes No   Sig: Take by mouth Daily   RESVERATROL PO   Yes No   Sig: Take 1,000 mg by mouth daily   Tetrahydrozoline HCl (EYE DROPS OP)   Yes No   Sig: Apply to eye   aspirin (ECOTRIN LOW STRENGTH) 81 mg EC tablet   No No   Sig: Take 1 tablet (81 mg total) by mouth daily   atorvastatin (LIPITOR) 40 mg tablet   No No   Sig: Take 1 tablet (40 mg total) by mouth every evening   donepezil (ARICEPT) 10 mg tablet   No No   Sig: TAKE 1 TABLET BY MOUTH EVERY DAY   Please schedule 6 month follow up    escitalopram (LEXAPRO) 5 mg tablet   Yes No   furosemide (LASIX) 20 mg tablet   No No   Sig: Take 1 tablet (20 mg total) by mouth daily   memantine (NAMENDA) 5 mg tablet   No No   Sig: By mouth take 1 tablet twice daily or as directed   metoprolol tartrate (LOPRESSOR) 25 mg tablet  Self Yes No   Sig: Take 25 mg by mouth 2 (two) times a day   montelukast (SINGULAIR) 10 mg tablet   Yes No   Sig: Take 10 mg by mouth every evening   rivaroxaban (XARELTO) 20 mg tablet  Self Yes No Sig: Take 20 mg by mouth daily   timolol (TIMOPTIC-XE) 0 5 % ophthalmic gel-forming  Self Yes No      Facility-Administered Medications: None       Past Medical History:   Diagnosis Date    Atrial fibrillation [facility])        Past Surgical History:   Procedure Laterality Date    BREAST SURGERY         Family History   Problem Relation Age of Onset    Parkinsonism Brother     Dementia Brother      I have reviewed and agree with the history as documented  E-Cigarette/Vaping     E-Cigarette/Vaping Substances     Social History     Tobacco Use    Smoking status: Never Smoker    Smokeless tobacco: Never Used   Substance Use Topics    Alcohol use: No    Drug use: No       Review of Systems   Musculoskeletal:        Fall/head injury  Left knee pain  Right shoulder contusion    Skin: Positive for wound (abrasion and ecchymosis)  All other systems reviewed and are negative  Physical Exam  Physical Exam  Constitutional:       General: She is not in acute distress  Appearance: She is well-developed  She is not ill-appearing or toxic-appearing  HENT:      Head: Normocephalic  Contusion present  No raccoon eyes or Arellano's sign  Right Ear: External ear normal  No hemotympanum  Left Ear: External ear normal  No hemotympanum  Nose: Nose normal  No signs of injury  Mouth/Throat:      Lips: Pink  Mouth: Mucous membranes are moist       Pharynx: Oropharynx is clear  Uvula midline  Eyes:      Extraocular Movements: Extraocular movements intact  Conjunctiva/sclera: Conjunctivae normal       Pupils: Pupils are equal, round, and reactive to light  Neck:      Musculoskeletal: Normal range of motion and neck supple  No spinous process tenderness or muscular tenderness  Cardiovascular:      Rate and Rhythm: Normal rate and regular rhythm  Pulmonary:      Effort: Pulmonary effort is normal  No respiratory distress  Breath sounds: Normal breath sounds  No stridor   No decreased breath sounds, wheezing, rhonchi or rales  Musculoskeletal:      Right shoulder: Normal       Left shoulder: Normal       Right hip: Normal       Left hip: Normal       Right knee: Normal       Left knee: She exhibits decreased range of motion (secondary to pain) and ecchymosis  She exhibits no swelling and no effusion  Tenderness found  Cervical back: Normal       Thoracic back: Normal       Lumbar back: Normal         Arms:       Right lower leg: Edema (1+ pitting) present  Left lower leg: Edema (1+ pitting) present  Comments: Neurovascularly intact distally  5/5 strength bilateral upper extremities  Radial pulse 2 +  Cap refill <2 seconds  Sensation intact  Neurovascularly intact distally  Distal pulses intact  Cap refill <2 seconds  Sensation intact  Skin:     General: Skin is warm and dry  Capillary Refill: Capillary refill takes less than 2 seconds  Findings: No rash  Neurological:      Mental Status: She is alert and oriented to person, place, and time  GCS: GCS eye subscore is 4  GCS verbal subscore is 5  GCS motor subscore is 6  Sensory: No sensory deficit  Gait: Gait normal       Deep Tendon Reflexes: Reflexes are normal and symmetric  Reflex Scores:       Patellar reflexes are 2+ on the right side and 2+ on the left side    Psychiatric:         Behavior: Behavior normal          Vital Signs  ED Triage Vitals [02/05/21 1745]   Temperature Pulse Respirations Blood Pressure SpO2   98 6 °F (37 °C) 78 18 125/58 98 %      Temp Source Heart Rate Source Patient Position - Orthostatic VS BP Location FiO2 (%)   Oral Monitor Lying Left arm --      Pain Score       --           Vitals:    02/05/21 1745 02/05/21 1852   BP: 125/58 117/57   Pulse: 78 79   Patient Position - Orthostatic VS: Lying Lying         Visual Acuity  Visual Acuity      Most Recent Value   L Pupil Size (mm)  3   R Pupil Size (mm)  3          ED Medications  Medications neomycin-bacitracin-polymyxin b (NEOSPORIN) ointment 1 small application (has no administration in time range)       Diagnostic Studies  Results Reviewed     None                 XR shoulder 2+ views RIGHT   ED Interpretation by Sherri Echeverria PA-C (02/05 2033)   No acute osseous abnormality seen by me          XR humerus RIGHT   ED Interpretation by Sherri Echeverria PA-C (02/05 2033)   No acute osseous abnormality seen by me        CT head without contrast   Final Result by Ender Mabry MD (02/05 1913)      No acute intracranial abnormality  Workstation performed: [id number]         CT facial bones without contrast   Final Result by Ender Mabry MD (02/05 1911)      Intact facial bones  Workstation performed: [id number]         XR knee 4+ views left injury   ED Interpretation by Sherri Echeverria PA-C (02/05 2033)   No acute osseous abnormality seen by me                   Procedures  Procedures         ED Course                                           MDM  Number of Diagnoses or Management Options  Abrasion of face, initial encounter: new and does not require workup  Acute pain of left knee: new and does not require workup  Fall, initial encounter: new and does not require workup  Injury of head, initial encounter: new and does not require workup  Traumatic hematoma of right shoulder, initial encounter: new and does not require workup  Diagnosis management comments: Patient is an 78 y/o female with hx of Afib (on Xarelto and ASA), presents to the ED via EMS for evaluation of mechanical fall  Patient is from [facility], patient states PTA she was called for dinner when she was walking down the hallway and tripped on the carpeting, falling onto her face and knees  Pt denies LOC  Pt denies chest pain, SOB or dizziness prior to fall  Pt was helped up by other residents and EMS was called  Pt sustained abrasion to right forehead   Pt also with bruising to right shoulder and left knee  X-ray left knee shows no acute osseous abnormality seen by me, however the film will be reviewed by a radiologist   I informed the patient of this and if there is any discrepancy, the patient will be contacted  X-ray right shoulder and humerus shows no acute osseous abnormality seen by me, however the film will be reviewed by a radiologist   I informed the patient of this and if there is any discrepancy, the patient will be contacted  CT head shows no acute intracranial abnormality   CT facial bones shows no fracture     Neosporin applied to abrasion  Patient safe for discharge back to NH  Patient verbalizes understanding and agrees with plan  The management plan was discussed in detail with the patient at bedside and all questions were answered  Prior to discharge, I provided both verbal and written instructions  I discussed with the patient the signs and symptoms for which to return to the emergency department  All questions were answered and patient was comfortable with the plan of care and discharged to home  The patient agrees to return to the Emergency Department for concerns and/or progression of illness  Disposition  Final diagnoses:   Fall, initial encounter   Injury of head, initial encounter   Abrasion of face, initial encounter   Acute pain of left knee   Traumatic hematoma of right shoulder, initial encounter     Time reflects when diagnosis was documented in both MDM as applicable and the Disposition within this note     Time User Action Codes Description Comment    2/5/2021  8:32 PM Jerald Smart Add [G39  ZIPX] Fall, initial encounter     2/5/2021  8:32 PM Jerald Smart Add [O35 [id number]] Injury of head, initial encounter     2/5/2021  8:32 PM Jerald Smart Add [S00 [id number]] Abrasion of face, initial encounter     2/5/2021  8:33 PM Jerald Smart Add [M25 [id number]] Acute pain of left knee     2/5/2021  8:33 PM Jerald Smart Add [S40 011A] Traumatic hematoma of right shoulder, initial encounter       ED Disposition     ED Disposition Condition Date/Time Comment    Discharge Stable Fri Feb 5, 2021  8:32 PM Rubi Powers discharge to home/self care  Follow-up Information     Follow up With Specialties Details Why Contact Info    Telly Mckeon MD [facility] Medicine Call   [facility]  [address]            Patient's Medications   Discharge Prescriptions    No medications on file     No discharge procedures on file      PDMP Review     None          ED Provider  Electronically Signed by           Thierno Moon PA-C  02/06/21 0003

## 2021-02-06 RX ADMIN — BACITRACIN ZINC, NEOMYCIN, POLYMYXIN B 1 SMALL APPLICATION: 400; 3.5; 5 OINTMENT TOPICAL at 00:29

## 2021-02-09 ENCOUNTER — NURSING HOME VISIT (OUTPATIENT)
Dept: GERIATRICS | Facility: OTHER | Age: 81
End: 2021-02-09
Payer: COMMERCIAL

## 2021-02-09 DIAGNOSIS — G30.1 LATE ONSET ALZHEIMER'S DISEASE WITHOUT BEHAVIORAL DISTURBANCE (HCC): ICD-10-CM

## 2021-02-09 DIAGNOSIS — F33.3 SEVERE EPISODE OF RECURRENT MAJOR DEPRESSIVE DISORDER, WITH PSYCHOTIC FEATURES (HCC): Primary | ICD-10-CM

## 2021-02-09 DIAGNOSIS — F02.80 LATE ONSET ALZHEIMER'S DISEASE WITHOUT BEHAVIORAL DISTURBANCE (HCC): ICD-10-CM

## 2021-02-09 PROCEDURE — 99326 PR DOMICIL/REST HOME NEW PT HI-MOD SEVER 45 MINUTES: CPT | Performed by: NURSE PRACTITIONER

## 2021-02-11 PROBLEM — F33.3 SEVERE EPISODE OF RECURRENT MAJOR DEPRESSIVE DISORDER, WITH PSYCHOTIC FEATURES (HCC): Status: ACTIVE | Noted: 2021-02-11

## 2021-02-11 RX ORDER — OLANZAPINE 2.5 MG/1
2.5 TABLET ORAL
COMMUNITY

## 2021-02-11 NOTE — PROGRESS NOTES
[address]  POS: 13: [address], [facility] [employer] [facility]     PSYCHIATRIC EVALUATION     NAME: Dolores Hackett  AGE: 79 y o  SEX: female [id number]    DATE OF ENCOUNTER: 2/9/2021    Code status:  full code    Assessment and Plan      Diagnosis ICD-10-CM Associated Orders   1  Severe episode of recurrent major depressive disorder, with psychotic features ([facility] [address] )  F33 3    2  Late onset Alzheimer's disease without behavioral disturbance (HCC)  G30 1     F02 80        All medications and routine orders were reviewed and updated as needed  Evaluation of Psychotropic Drugs for possible gradual dose reductions    Psychotropic medications have been reviewed  Patient continues with symptoms of depression as noted below  Any dose reductions at this time would be clinically contraindicated, as it would be likely to cause worsening of symptoms  Plan discussed with: Patient    Treatment Recommendations/Precautions:    Continue medications the same  Medication management every 1 month    Medications Risks/Benefits:      Risks, Benefits And Possible Side Effects Of Medications:    Risks, benefits, and possible side effects of medications explained to Leah Molino and she verbalizes understanding and agreement for treatment  Controlled Medication Discussion:     Not applicable - controlled prescriptions are not prescribed by this practice    Chief Complaint     Seen for Psychiatric Consult  at Nursing Facility/Assisted Living    History of Present Illness     Leah Dawn is an 78 yo female with a history of Alzheimer's and Major Depression  She is recently arrived to [facility]  Prior to this, was at another assisted living facility  Psychiatry is consulted to establish and to follow for depression  She did have a fall 4 days ago and seen in ED  States she tripped on the way to the dining room  She has small abrasion on her forehead    She is not sure of the name of facility, believes it is March but does know it is 2021 and current president is Griffin  She is pleasant and cooperative, states mood is "good", she is not able to provide psychiatric history due to dementia  She is currently on Lexapro for depression and Zyprexa 2 5 mg  She was previously on Aricept and Namenda as recently as 1/21/2021- not sure why they were discontinued  Will follow up with her daughter for more history  In meantime, will continue medications the same  Depression  This is a chronic problem  The current episode started more than 1 year ago  The problem occurs daily  The problem has been gradually improving  The treatment provided moderate relief         She  reports fluctuating sleep pattern, fluctuating appetite, fluctuating energy levels    The following portions of the patient's history were reviewed and updated as appropriate: allergies, current medications, past family history, past medical history, past social history, past surgical history and problem list     Past Psychiatric History:     Past Inpatient Psychiatric Treatment:   No history of past inpatient psychiatric admissions  Past Outpatient Psychiatric Treatment:    Not in outpatient treatment recently  Past Suicide Attempts: no  Past Violent Behavior: no  Past Psychiatric Medication Trials: patient does not remember    Traumatic History:     Abuse: no history of physical or sexual abuse  Other Traumatic Events: none    HISTORY:  Past Medical History:   Diagnosis Date    [facility]      Family History   Problem Relation Age of Onset    Parkinsonism Brother     Dementia Brother      Social History     Socioeconomic History    Marital status: Single     Spouse name: None    Number of children: None    Years of education: None    Highest education level: None   Occupational History    None   Social Needs    Financial resource strain: None    Food insecurity     Worry: None     Inability: None    Transportation needs     Medical: None     Non-medical: None   Tobacco Use    Smoking status: Never Smoker    Smokeless tobacco: Never Used   Substance and Sexual Activity    Alcohol use: No    Drug use: No    Sexual activity: None   Lifestyle    Physical activity     Days per week: None     Minutes per session: None    Stress: None   Relationships    Social connections     Talks on phone: None     Gets together: None     Attends Synagogue service: None     Active member of club or organization: None     Attends meetings of clubs or organizations: None     Relationship status: None    Intimate partner violence     Fear of current or ex partner: None     Emotionally abused: None     Physically abused: None     Forced sexual activity: None   Other Topics Concern    None   Social History Narrative    None       Allergies:  No Known Allergies    Review of Systems     Review of Systems   Psychiatric/Behavioral: Positive for confusion, decreased concentration, depression and dysphoric mood  All other systems reviewed and are negative        Medications and orders       Current Outpatient Medications:     aspirin (ECOTRIN LOW STRENGTH) 81 mg EC tablet, Take 1 tablet (81 mg total) by mouth daily, Disp: 30 tablet, Rfl: 11    atorvastatin (LIPITOR) 40 mg tablet, Take 1 tablet (40 mg total) by mouth every evening, Disp: 30 tablet, Rfl: 11    escitalopram (LEXAPRO) 5 mg tablet, , Disp: , Rfl:     furosemide (LASIX) 20 mg tablet, Take 1 tablet (20 mg total) by mouth daily, Disp: 90 tablet, Rfl: 3    Glucosamine Sulfate 500 MG TABS, Take by mouth Daily, Disp: , Rfl:     metoprolol tartrate (LOPRESSOR) 25 mg tablet, Take 25 mg by mouth 2 (two) times a day, Disp: , Rfl:     montelukast (SINGULAIR) 10 mg tablet, Take 10 mg by mouth every evening, Disp: , Rfl: 0    RESVERATROL PO, Take 1,000 mg by mouth daily, Disp: , Rfl:     rivaroxaban (XARELTO) 20 mg tablet, Take 20 mg by mouth daily, Disp: , Rfl:   Tetrahydrozoline HCl (EYE DROPS OP), Apply to eye, Disp: , Rfl:     timolol (TIMOPTIC-XE) 0 5 % ophthalmic gel-forming, , Disp: , Rfl:       Objective     Mental Status Evaluation:    Appearance age appropriate, casually dressed   Behavior cooperative, calm   Speech normal rate and volume   Mood depressed   Affect normal range and intensity, appropriate   Thought Processes organized, goal directed   Associations intact associations   Thought Content no overt delusions   Perceptual Disturbances: no auditory hallucinations, no visual hallucinations   Abnormal Thoughts  Risk Potential Suicidal ideation - None  Homicidal ideation - None  Potential for aggression - No   Orientation oriented to person   Memory recent memory impaired   Consciousness alert and awake   Attention Span Concentration Span decreased attention span  decreased concentration   Intellect appears to be of average intelligence   Insight limited   Judgement limited   Muscle Strength and  Gait normal muscle strength and normal muscle tone, normal gait and normal balance   Motor Activity no abnormal movements   Language no difficulty repeating a phrase, no difficulty writing a sentence, difficulty naming common objects   Fund of Knowledge adequate fund of knowledge regarding past history  adequate fund of knowledge regarding vocabulary   impaired knowledge of current events   Pain none   Pain Scale 0       Physical Exam  Vitals signs and nursing note reviewed  Psychiatric:         Mood and Affect: Mood is depressed  Affect is flat  Speech: Speech is delayed  Behavior: Behavior is slowed and withdrawn  Cognition and Memory: Cognition is impaired  Pertinent Laboratory/Diagnostic Studies:   I have personally reviewed pertinent lab results  Counseling / Coordination of Care  Total floor / unit time spent today 45 minutes   Greater than 50% of total time was spent with the patient and / or family counseling and / or coordination of care       DOMINIQUE Adame 2/9/2021

## 2022-07-23 ENCOUNTER — HOSPITAL ENCOUNTER (EMERGENCY)
Facility: HOSPITAL | Age: 82
Discharge: HOME | End: 2022-07-23
Attending: EMERGENCY MEDICINE | Admitting: EMERGENCY MEDICINE
Payer: COMMERCIAL

## 2022-07-23 ENCOUNTER — APPOINTMENT (EMERGENCY)
Dept: CT IMAGING | Facility: HOSPITAL | Age: 82
End: 2022-07-23
Payer: COMMERCIAL

## 2022-07-23 VITALS
SYSTOLIC BLOOD PRESSURE: 118 MMHG | WEIGHT: 118.83 LBS | RESPIRATION RATE: 18 BRPM | HEART RATE: 95 BPM | DIASTOLIC BLOOD PRESSURE: 55 MMHG | OXYGEN SATURATION: 98 % | TEMPERATURE: 98.4 F | BODY MASS INDEX: 24.84 KG/M2

## 2022-07-23 DIAGNOSIS — S09.90XA INJURY OF HEAD, INITIAL ENCOUNTER: ICD-10-CM

## 2022-07-23 DIAGNOSIS — J32.9 SINUSITIS: ICD-10-CM

## 2022-07-23 DIAGNOSIS — S01.01XA LACERATION OF SCALP, INITIAL ENCOUNTER: ICD-10-CM
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
## 2022-07-23 PROCEDURE — 90715 TDAP VACCINE 7 YRS/> IM: CPT | Performed by: EMERGENCY MEDICINE

## 2022-07-23 PROCEDURE — 72125 CT NECK SPINE W/O DYE: CPT

## 2022-07-23 PROCEDURE — 99284 EMERGENCY DEPT VISIT MOD MDM: CPT

## 2022-07-23 PROCEDURE — G1004 CDSM NDSC: HCPCS

## 2022-07-23 PROCEDURE — 70450 CT HEAD/BRAIN W/O DYE: CPT

## 2022-07-23 PROCEDURE — 99282 EMERGENCY DEPT VISIT SF MDM: CPT | Performed by: EMERGENCY MEDICINE

## 2022-07-23 PROCEDURE — 90471 IMMUNIZATION ADMIN: CPT

## 2022-07-23 RX ORDER — DONEPEZIL HYDROCHLORIDE 5 MG/1
5 TABLET, FILM COATED ORAL
COMMUNITY

## 2022-07-23 RX ORDER — POTASSIUM CHLORIDE 750 MG/1
10 TABLET, EXTENDED RELEASE ORAL 2 TIMES DAILY
COMMUNITY

## 2022-07-23 RX ORDER — CLINDAMYCIN HYDROCHLORIDE 300 MG/1
300 CAPSULE ORAL 3 TIMES DAILY
COMMUNITY

## 2022-07-23 RX ORDER — SENNA PLUS 8.6 MG/1
1 TABLET ORAL DAILY
COMMUNITY

## 2022-07-23 RX ORDER — SERTRALINE HYDROCHLORIDE 25 MG/1
25 TABLET, FILM COATED ORAL DAILY
COMMUNITY

## 2022-07-23 RX ORDER — ANORECTAL OINTMENT 15.7; .44; 24; 20.6 G/100G; G/100G; G/100G; G/100G
OINTMENT TOPICAL 2 TIMES DAILY
COMMUNITY

## 2022-07-23 RX ORDER — BUSPIRONE HYDROCHLORIDE 5 MG/1
5 TABLET ORAL 3 TIMES DAILY
COMMUNITY

## 2022-07-23 RX ADMIN — TETANUS TOXOID, REDUCED DIPHTHERIA TOXOID AND ACELLULAR PERTUSSIS VACCINE, ADSORBED 0.5 ML: 5; 2.5; 8; 8; 2.5 SUSPENSION INTRAMUSCULAR at 19:56

## 2022-07-23 NOTE — ED PROVIDER NOTES
History  Chief Complaint   Patient presents with    Fall     Per EMS patient fell out of bed, positive for head strike, positive for thinners, per facility patient is behaving at baseline  LAC to left head  History provided by:  Patient, medical records and nursing home  History limited by:  Dementia   used: No    Fall  Mechanism of injury: fall    Injury location:  Head/neck  Head/neck injury location:  Head and scalp  Incident location:  Nursing home (dementia unit  Raegan Das out of bed )  Time since incident: just prior to arrival   Arrived directly from scene: yes    Fall:     Fall occurred:  From a bed    Impact surface:  Hard floor    Point of impact:  Unable to specify  Protective equipment: none    Suspicion of alcohol use: no    Suspicion of drug use: no    Tetanus status:  Unknown (reviewed med list and computer chart, no report of last tetanus )  Prior to arrival data: Airway condition since incident:  Stable    Breathing condition since incident:  Stable    Circulation condition since incident:  Stable    Mental status condition since incident:  Stable  Associated symptoms: no abdominal pain, no back pain, no chest pain, no headaches, no loss of consciousness, no nausea, no neck pain and no vomiting    Risk factors: anticoagulation therapy        Prior to Admission Medications   Prescriptions Last Dose Informant Patient Reported? Taking?    Glucosamine Sulfate 500 MG TABS   Yes Yes   Sig: Take by mouth Daily   OLANZapine (ZyPREXA) 2 5 mg tablet Not Taking at Unknown time  Yes No   Sig: Take 2 5 mg by mouth daily at bedtime   Patient not taking: Reported on 7/23/2022   RESVERATROL PO Not Taking at Unknown time  Yes No   Sig: Take 1,000 mg by mouth daily   Patient not taking: Reported on 7/23/2022   Tetrahydrozoline HCl (EYE DROPS OP) Not Taking at Unknown time  Yes No   Sig: Apply to eye   Patient not taking: Reported on 7/23/2022   aspirin (ECOTRIN LOW STRENGTH) 81 mg EC tablet No Yes   Sig: Take 1 tablet (81 mg total) by mouth daily   atorvastatin (LIPITOR) 40 mg tablet   No Yes   Sig: Take 1 tablet (40 mg total) by mouth every evening   busPIRone (BUSPAR) 5 mg tablet   Yes Yes   Sig: Take 5 mg by mouth 3 (three) times a day   clindamycin (CLEOCIN) 300 MG capsule   Yes Yes   Sig: Take 300 mg by mouth 3 (three) times a day   donepezil (ARICEPT) 5 mg tablet   Yes Yes   Sig: Take 5 mg by mouth daily at bedtime   escitalopram (LEXAPRO) 5 mg tablet Not Taking at Unknown time  Yes No   Patient not taking: Reported on 7/23/2022   furosemide (LASIX) 20 mg tablet   No Yes   Sig: Take 1 tablet (20 mg total) by mouth daily   Patient taking differently: Take 40 mg by mouth daily   menthol-zinc oxide (Calmoseptine) 0 44-20 6 % OINT   Yes Yes   Sig: Apply topically 2 (two) times a day   metoprolol tartrate (LOPRESSOR) 25 mg tablet  Self Yes Yes   Sig: Take 25 mg by mouth 2 (two) times a day   montelukast (SINGULAIR) 10 mg tablet   Yes Yes   Sig: Take 10 mg by mouth every evening   mupirocin (BACTROBAN) 2 % ointment   Yes Yes   Sig: Apply topically 3 (three) times a day   potassium chloride (K-DUR,KLOR-CON) 10 mEq tablet   Yes Yes   Sig: Take 10 mEq by mouth 2 (two) times a day   rivaroxaban (XARELTO) 20 mg tablet  Self Yes Yes   Sig: Take 20 mg by mouth daily   senna (SENOKOT) 8 6 MG tablet   Yes Yes   Sig: Take 1 tablet by mouth daily   sertraline (ZOLOFT) 25 mg tablet   Yes Yes   Sig: Take 25 mg by mouth daily   timolol (TIMOPTIC-XE) 0 5 % ophthalmic gel-forming Not Taking at Unknown time Self Yes No   Patient not taking: Reported on 7/23/2022      Facility-Administered Medications: None       Past Medical History:   Diagnosis Date    Alzheimer's dementia ([facility] [address] )     Atrial fibrillation (HCC)     Depression     Hyperlipemia     Hypertension        Past Surgical History:   Procedure Laterality Date    BREAST SURGERY         Family History   Problem Relation Age of Onset    Parkinsonism Brother  Dementia Brother      I have reviewed and agree with the history as documented  E-Cigarette/Vaping     E-Cigarette/Vaping Substances     Social History     Tobacco Use    Smoking status: Never Smoker    Smokeless tobacco: Never Used   Substance Use Topics    Alcohol use: No    Drug use: No       Review of Systems   Unable to perform ROS: Dementia   HENT: Positive for facial swelling  Negative for nosebleeds  Respiratory: Negative for chest tightness and shortness of breath  Cardiovascular: Negative for chest pain  Gastrointestinal: Negative for abdominal pain, nausea and vomiting  Genitourinary: Negative for flank pain  Musculoskeletal: Negative for arthralgias, back pain, joint swelling and neck pain  Skin: Positive for wound  Neurological: Negative for loss of consciousness, facial asymmetry, weakness, numbness and headaches  Psychiatric/Behavioral: Positive for confusion  Physical Exam  Physical Exam  Vitals and nursing note reviewed  Constitutional:       General: She is not in acute distress  Appearance: Normal appearance  She is well-developed  She is not ill-appearing, toxic-appearing or diaphoretic  HENT:      Head: Normocephalic  Laceration present  No raccoon eyes or Arellano's sign  Jaw: There is normal jaw occlusion  Right Ear: Hearing normal  No drainage or swelling  Left Ear: Hearing normal  No drainage or swelling  Eyes:      General: Lids are normal          Right eye: No discharge  Left eye: No discharge  Extraocular Movements: Extraocular movements intact  Conjunctiva/sclera: Conjunctivae normal       Pupils: Pupils are equal, round, and reactive to light  Neck:      Vascular: No JVD  Trachea: Trachea normal    Cardiovascular:      Rate and Rhythm: Normal rate and regular rhythm  Pulses: Normal pulses  Heart sounds: Normal heart sounds  No murmur heard  No friction rub  No gallop     Pulmonary: Effort: Pulmonary effort is normal  No respiratory distress  Breath sounds: Normal breath sounds  No stridor  No wheezing or rales  Chest:      Chest wall: No tenderness  Abdominal:      Palpations: Abdomen is soft  Tenderness: There is no abdominal tenderness  There is no guarding or rebound  Musculoskeletal:         General: No tenderness or deformity  Normal range of motion  Cervical back: Normal, normal range of motion and neck supple  No tenderness  Thoracic back: Normal       Lumbar back: Normal       Right lower leg: No edema  Left lower leg: No edema  Comments: Palpation of all her extremities and movement does not reveal any deformities pain or tenderness  Skin:     General: Skin is warm and dry  Coloration: Skin is not pale  Findings: No rash  Neurological:      Mental Status: She is alert  She is disoriented  GCS: GCS eye subscore is 4  GCS verbal subscore is 5  GCS motor subscore is 6  Cranial Nerves: Cranial nerves are intact  No cranial nerve deficit, dysarthria or facial asymmetry  Sensory: Sensation is intact  No sensory deficit  Motor: Motor function is intact  No tremor or abnormal muscle tone  Coordination: Coordination is intact  Psychiatric:         Mood and Affect: Mood normal          Speech: Speech normal          Behavior: Behavior is cooperative           Vital Signs  ED Triage Vitals [07/23/22 1913]   Temperature Pulse Respirations Blood Pressure SpO2   98 4 °F (36 9 °C) 95 18 118/55 98 %      Temp Source Heart Rate Source Patient Position - Orthostatic VS BP Location FiO2 (%)   Oral Monitor Lying Right arm --      Pain Score       --           Vitals:    07/23/22 1913   BP: 118/55   Pulse: 95   Patient Position - Orthostatic VS: Lying         Visual Acuity      ED Medications  Medications   tetanus-diphtheria-acellular pertussis (BOOSTRIX) IM injection 0 5 mL (0 5 mL Intramuscular Given 7/23/22 1956) Diagnostic Studies  Results Reviewed     None                 CT cervical spine without contrast   Final Result by Nelda Lay DO (07/23 2048)      No cervical spine fracture or traumatic malalignment  Workstation performed: [id number]         CT head without contrast   Final Result by Nelda Lay DO (07/23 2043)      No intracranial hemorrhage or calvarial fracture  Microangiopathy and volume loss  Left maxillary and bilateral sphenoid sinusitis  Workstation performed: [id number]                    Procedures  Procedures         ED Course                                             MDM  Number of Diagnoses or Management Options  Fall, initial encounter  Injury of head, initial encounter  Laceration of scalp, initial encounter  Sinusitis  Diagnosis management comments: Fall out of bed striking head on Xarelto  CT scan negative for acute fracture acute bleeding  The very superficial laceration was cleaned with sterile gauze and water and applied Dermabond to the wound with good hemostasis  There is some surrounding ecchymosis  CT scan of the neck was negative for acute fracture  There are no other signs of acute injury  Family was here in attendance and wants to take her back themselves  Amount and/or Complexity of Data Reviewed  Tests in the radiology section of CPT®: ordered and reviewed    Patient Progress  Patient progress: stable      Disposition  Final diagnoses:   Fall, initial encounter   Injury of head, initial encounter   Laceration of scalp, initial encounter   Sinusitis     Time reflects when diagnosis was documented in both MDM as applicable and the Disposition within this note     Time User Action Codes Description Comment    7/23/2022  9:06 PM Joycelyn Khan Add [Z63  VKEZ] Fall, initial encounter     7/23/2022  9:06 PM Joycelyn Khan Add [S09 90XA] Injury of head, initial encounter     7/23/2022  9:06 PM Valentine Amarilys Bryan Add [S01 01XA] Laceration of scalp, initial encounter     7/23/2022  9:07 PM Lory Arnold Add [J32 9] Sinusitis       ED Disposition     ED Disposition   Discharge    Condition   Stable    Date/Time   Sat Jul 23, 2022  9:06 PM    [place] Ganesh discharge to home/self care  Follow-up Information     Follow up With Specialties Details Why Contact Rudy Benton MD Family Medicine Schedule an appointment as soon as possible for a visit in 1 week for reevaluation [address]            Current Discharge Medication List      CONTINUE these medications which have NOT CHANGED    Details   aspirin (ECOTRIN LOW STRENGTH) 81 mg EC tablet Take 1 tablet (81 mg total) by mouth daily  Qty: 30 tablet, Refills: 11    Associated Diagnoses: Acute embolic stroke (HCC)      atorvastatin (LIPITOR) 40 mg tablet Take 1 tablet (40 mg total) by mouth every evening  Qty: 30 tablet, Refills: 11    Associated Diagnoses: Acute embolic stroke (HCC)      busPIRone (BUSPAR) 5 mg tablet Take 5 mg by mouth 3 (three) times a day      clindamycin (CLEOCIN) 300 MG capsule Take 300 mg by mouth 3 (three) times a day      donepezil (ARICEPT) 5 mg tablet Take 5 mg by mouth daily at bedtime      furosemide (LASIX) 20 mg tablet Take 1 tablet (20 mg total) by mouth daily  Qty: 90 tablet, Refills: 3    Associated Diagnoses: Lower extremity edema      Glucosamine Sulfate 500 MG TABS Take by mouth Daily      menthol-zinc oxide (Calmoseptine) 0 44-20 6 % OINT Apply topically 2 (two) times a day      metoprolol tartrate (LOPRESSOR) 25 mg tablet Take 25 mg by mouth 2 (two) times a day      montelukast (SINGULAIR) 10 mg tablet Take 10 mg by mouth every evening  Refills: 0      mupirocin (BACTROBAN) 2 % ointment Apply topically 3 (three) times a day      potassium chloride (K-DUR,KLOR-CON) 10 mEq tablet Take 10 mEq by mouth 2 (two) times a day rivaroxaban (XARELTO) 20 mg tablet Take 20 mg by mouth daily      senna (SENOKOT) 8 6 MG tablet Take 1 tablet by mouth daily      sertraline (ZOLOFT) 25 mg tablet Take 25 mg by mouth daily      escitalopram (LEXAPRO) 5 mg tablet       OLANZapine (ZyPREXA) 2 5 mg tablet Take 2 5 mg by mouth daily at bedtime      RESVERATROL PO Take 1,000 mg by mouth daily      Tetrahydrozoline HCl (EYE DROPS OP) Apply to eye      timolol (TIMOPTIC-XE) 0 5 % ophthalmic gel-forming              No discharge procedures on file      PDMP Review     None          ED Provider  Electronically Signed by           Linda Mccrary MD  07/23/22 4430

## 2022-11-30 ENCOUNTER — APPOINTMENT (EMERGENCY)
Dept: CT IMAGING | Facility: HOSPITAL | Age: 82
End: 2022-11-30

## 2022-11-30 ENCOUNTER — APPOINTMENT (OUTPATIENT)
Dept: NON INVASIVE DIAGNOSTICS | Facility: HOSPITAL | Age: 82
End: 2022-11-30

## 2022-11-30 ENCOUNTER — HOSPITAL ENCOUNTER (INPATIENT)
Facility: HOSPITAL | Age: 82
LOS: 2 days | Discharge: HOSPICE | End: 2022-12-02
Attending: EMERGENCY MEDICINE | Admitting: STUDENT IN AN ORGANIZED HEALTH CARE EDUCATION/TRAINING PROGRAM

## 2022-11-30 DIAGNOSIS — J96.92 HYPERCAPNIC RESPIRATORY FAILURE (HCC): ICD-10-CM

## 2022-11-30 DIAGNOSIS — A41.9 SEPSIS (HCC): ICD-10-CM

## 2022-11-30 DIAGNOSIS — R41.82 ALTERED MENTAL STATUS: Primary | ICD-10-CM

## 2022-11-30 DIAGNOSIS — R09.02 HYPOXIA: ICD-10-CM

## 2022-11-30 DIAGNOSIS — J18.9 PNEUMONIA: ICD-10-CM

## 2022-11-30 PROBLEM — G93.40 ENCEPHALOPATHY ACUTE: Status: ACTIVE | Noted: 2022-11-30

## 2022-11-30 PROBLEM — J96.02 ACUTE HYPERCAPNIC RESPIRATORY FAILURE (HCC): Status: ACTIVE | Noted: 2022-11-30

## 2022-11-30 LAB
ALBUMIN SERPL BCP-MCNC: 2.3 G/DL (ref 3.5–5)
ALBUMIN SERPL BCP-MCNC: 2.6 G/DL (ref 3.5–5)
ALP SERPL-CCNC: 111 U/L (ref 46–116)
ALP SERPL-CCNC: 97 U/L (ref 46–116)
ALT SERPL W P-5'-P-CCNC: 11 U/L (ref 12–78)
ALT SERPL W P-5'-P-CCNC: 13 U/L (ref 12–78)
ANION GAP SERPL CALCULATED.3IONS-SCNC: 2 MMOL/L (ref 4–13)
ANION GAP SERPL CALCULATED.3IONS-SCNC: 3 MMOL/L (ref 4–13)
AORTIC ROOT: 2.5 CM
APICAL FOUR CHAMBER EJECTION FRACTION: 54 %
APTT PPP: 30 SECONDS (ref 23–37)
APTT PPP: 30 SECONDS (ref 23–37)
AST SERPL W P-5'-P-CCNC: 15 U/L (ref 5–45)
AST SERPL W P-5'-P-CCNC: 16 U/L (ref 5–45)
ATRIAL RATE: 187 BPM
BASE EX.OXY STD BLDV CALC-SCNC: 82.5 % (ref 60–80)
BASE EXCESS BLDV CALC-SCNC: 5.7 MMOL/L
BASOPHILS # BLD AUTO: 0.03 THOUSANDS/ÂΜL (ref 0–0.1)
BASOPHILS # BLD AUTO: 0.05 THOUSANDS/ÂΜL (ref 0–0.1)
BASOPHILS NFR BLD AUTO: 1 % (ref 0–1)
BASOPHILS NFR BLD AUTO: 1 % (ref 0–1)
BILIRUB SERPL-MCNC: 0.27 MG/DL (ref 0.2–1)
BILIRUB SERPL-MCNC: 0.3 MG/DL (ref 0.2–1)
BILIRUB UR QL STRIP: NEGATIVE
BUN SERPL-MCNC: 19 MG/DL (ref 5–25)
BUN SERPL-MCNC: 21 MG/DL (ref 5–25)
CALCIUM ALBUM COR SERPL-MCNC: 10.2 MG/DL (ref 8.3–10.1)
CALCIUM ALBUM COR SERPL-MCNC: 10.5 MG/DL (ref 8.3–10.1)
CALCIUM SERPL-MCNC: 8.8 MG/DL (ref 8.3–10.1)
CALCIUM SERPL-MCNC: 9.4 MG/DL (ref 8.3–10.1)
CARDIAC TROPONIN I PNL SERPL HS: 9 NG/L (ref 8–18)
CHLORIDE SERPL-SCNC: 108 MMOL/L (ref 96–108)
CHLORIDE SERPL-SCNC: 109 MMOL/L (ref 96–108)
CK SERPL-CCNC: 13 U/L (ref 26–192)
CLARITY UR: CLEAR
CO2 SERPL-SCNC: 36 MMOL/L (ref 21–32)
CO2 SERPL-SCNC: 37 MMOL/L (ref 21–32)
COLOR UR: YELLOW
CREAT SERPL-MCNC: 1.21 MG/DL (ref 0.6–1.3)
CREAT SERPL-MCNC: 1.3 MG/DL (ref 0.6–1.3)
EOSINOPHIL # BLD AUTO: 0.17 THOUSAND/ÂΜL (ref 0–0.61)
EOSINOPHIL # BLD AUTO: 0.18 THOUSAND/ÂΜL (ref 0–0.61)
EOSINOPHIL NFR BLD AUTO: 3 % (ref 0–6)
EOSINOPHIL NFR BLD AUTO: 4 % (ref 0–6)
ERYTHROCYTE [DISTWIDTH] IN BLOOD BY AUTOMATED COUNT: 15.1 % (ref 11.6–15.1)
ERYTHROCYTE [DISTWIDTH] IN BLOOD BY AUTOMATED COUNT: 15.1 % (ref 11.6–15.1)
FRACTIONAL SHORTENING: 26 (ref 28–44)
GFR SERPL CREATININE-BSD FRML MDRD: 38 ML/MIN/1.73SQ M
GFR SERPL CREATININE-BSD FRML MDRD: 41 ML/MIN/1.73SQ M
GLUCOSE SERPL-MCNC: 103 MG/DL (ref 65–140)
GLUCOSE SERPL-MCNC: 83 MG/DL (ref 65–140)
GLUCOSE SERPL-MCNC: 91 MG/DL (ref 65–140)
GLUCOSE SERPL-MCNC: 96 MG/DL (ref 65–140)
GLUCOSE SERPL-MCNC: 97 MG/DL (ref 65–140)
GLUCOSE UR STRIP-MCNC: NEGATIVE MG/DL
HCO3 BLDV-SCNC: 34 MMOL/L (ref 24–30)
HCT VFR BLD AUTO: 31.7 % (ref 34.8–46.1)
HCT VFR BLD AUTO: 34.4 % (ref 34.8–46.1)
HGB BLD-MCNC: 10.6 G/DL (ref 11.5–15.4)
HGB BLD-MCNC: 9.8 G/DL (ref 11.5–15.4)
HGB UR QL STRIP.AUTO: NEGATIVE
IMM GRANULOCYTES # BLD AUTO: 0.04 THOUSAND/UL (ref 0–0.2)
IMM GRANULOCYTES # BLD AUTO: 0.05 THOUSAND/UL (ref 0–0.2)
IMM GRANULOCYTES NFR BLD AUTO: 1 % (ref 0–2)
IMM GRANULOCYTES NFR BLD AUTO: 1 % (ref 0–2)
INR PPP: 1.54 (ref 0.84–1.19)
INR PPP: 1.72 (ref 0.84–1.19)
INTERVENTRICULAR SEPTUM IN DIASTOLE (PARASTERNAL SHORT AXIS VIEW): 0.8 CM
INTERVENTRICULAR SEPTUM: 0.8 CM (ref 0.6–1.1)
KETONES UR STRIP-MCNC: NEGATIVE MG/DL
LAAS-AP2: 25.4 CM2
LAAS-AP4: 27.8 CM2
LACTATE SERPL-SCNC: 0.7 MMOL/L (ref 0.5–2)
LACTATE SERPL-SCNC: 0.8 MMOL/L (ref 0.5–2)
LEFT ATRIUM SIZE: 4.4 CM
LEFT INTERNAL DIMENSION IN SYSTOLE: 2.9 CM (ref 2.1–4)
LEFT VENTRICULAR INTERNAL DIMENSION IN DIASTOLE: 3.9 CM (ref 3.5–6)
LEFT VENTRICULAR POSTERIOR WALL IN END DIASTOLE: 1 CM
LEFT VENTRICULAR STROKE VOLUME: 34 ML
LEUKOCYTE ESTERASE UR QL STRIP: NEGATIVE
LVSV (TEICH): 34 ML
LYMPHOCYTES # BLD AUTO: 1.19 THOUSANDS/ÂΜL (ref 0.6–4.47)
LYMPHOCYTES # BLD AUTO: 1.29 THOUSANDS/ÂΜL (ref 0.6–4.47)
LYMPHOCYTES NFR BLD AUTO: 24 % (ref 14–44)
LYMPHOCYTES NFR BLD AUTO: 28 % (ref 14–44)
MAGNESIUM SERPL-MCNC: 2 MG/DL (ref 1.6–2.6)
MCH RBC QN AUTO: 32.8 PG (ref 26.8–34.3)
MCH RBC QN AUTO: 33 PG (ref 26.8–34.3)
MCHC RBC AUTO-ENTMCNC: 30.8 G/DL (ref 31.4–37.4)
MCHC RBC AUTO-ENTMCNC: 30.9 G/DL (ref 31.4–37.4)
MCV RBC AUTO: 107 FL (ref 82–98)
MCV RBC AUTO: 107 FL (ref 82–98)
MONOCYTES # BLD AUTO: 0.47 THOUSAND/ÂΜL (ref 0.17–1.22)
MONOCYTES # BLD AUTO: 0.48 THOUSAND/ÂΜL (ref 0.17–1.22)
MONOCYTES NFR BLD AUTO: 10 % (ref 4–12)
MONOCYTES NFR BLD AUTO: 10 % (ref 4–12)
NEUTROPHILS # BLD AUTO: 2.59 THOUSANDS/ÂΜL (ref 1.85–7.62)
NEUTROPHILS # BLD AUTO: 3.03 THOUSANDS/ÂΜL (ref 1.85–7.62)
NEUTS SEG NFR BLD AUTO: 56 % (ref 43–75)
NEUTS SEG NFR BLD AUTO: 61 % (ref 43–75)
NITRITE UR QL STRIP: NEGATIVE
NRBC BLD AUTO-RTO: 0 /100 WBCS
NRBC BLD AUTO-RTO: 0 /100 WBCS
NT-PROBNP SERPL-MCNC: 1940 PG/ML
O2 CT BLDV-SCNC: 12.4 ML/DL
PCO2 BLDV: 71.8 MM HG (ref 42–50)
PH BLDV: 7.29 [PH] (ref 7.3–7.4)
PH UR STRIP.AUTO: 6 [PH]
PLATELET # BLD AUTO: 187 THOUSANDS/UL (ref 149–390)
PLATELET # BLD AUTO: 196 THOUSANDS/UL (ref 149–390)
PMV BLD AUTO: 9.1 FL (ref 8.9–12.7)
PMV BLD AUTO: 9.2 FL (ref 8.9–12.7)
PO2 BLDV: 53.1 MM HG (ref 35–45)
POTASSIUM SERPL-SCNC: 3.8 MMOL/L (ref 3.5–5.3)
POTASSIUM SERPL-SCNC: 4.5 MMOL/L (ref 3.5–5.3)
PROCALCITONIN SERPL-MCNC: 0.07 NG/ML
PROCALCITONIN SERPL-MCNC: 0.07 NG/ML
PROT SERPL-MCNC: 6.3 G/DL (ref 6.4–8.4)
PROT SERPL-MCNC: 6.7 G/DL (ref 6.4–8.4)
PROT UR STRIP-MCNC: NEGATIVE MG/DL
PROTHROMBIN TIME: 18.4 SECONDS (ref 11.6–14.5)
PROTHROMBIN TIME: 20.1 SECONDS (ref 11.6–14.5)
QRS AXIS: 58 DEGREES
QRSD INTERVAL: 76 MS
QT INTERVAL: 334 MS
QTC INTERVAL: 390 MS
RA PRESSURE ESTIMATED: 5 MMHG
RBC # BLD AUTO: 2.97 MILLION/UL (ref 3.81–5.12)
RBC # BLD AUTO: 3.23 MILLION/UL (ref 3.81–5.12)
RIGHT ATRIAL 2D VOLUME: 21 ML
RIGHT ATRIUM AREA SYSTOLE A4C: 11.9 CM2
RIGHT VENTRICLE ID DIMENSION: 3.5 CM
RV PSP: 33 MMHG
SL CV LEFT ATRIUM LENGTH A2C: 6 CM
SL CV LV EF: 54
SL CV PED ECHO LEFT VENTRICLE DIASTOLIC VOLUME (MOD BIPLANE) 2D: 68 ML
SL CV PED ECHO LEFT VENTRICLE SYSTOLIC VOLUME (MOD BIPLANE) 2D: 33 ML
SODIUM SERPL-SCNC: 147 MMOL/L (ref 135–147)
SODIUM SERPL-SCNC: 148 MMOL/L (ref 135–147)
SP GR UR STRIP.AUTO: 1.01 (ref 1–1.03)
T WAVE AXIS: 56 DEGREES
TR MAX PG: 28 MMHG
TR PEAK VELOCITY: 2.6 M/S
TRICUSPID VALVE PEAK REGURGITATION VELOCITY: 2.64 M/S
UROBILINOGEN UR QL STRIP.AUTO: 0.2 E.U./DL
VALPROATE SERPL-MCNC: <3 UG/ML (ref 50–100)
VENTRICULAR RATE: 82 BPM
WBC # BLD AUTO: 4.64 THOUSAND/UL (ref 4.31–10.16)
WBC # BLD AUTO: 4.93 THOUSAND/UL (ref 4.31–10.16)

## 2022-11-30 RX ORDER — FUROSEMIDE 10 MG/ML
40 INJECTION INTRAMUSCULAR; INTRAVENOUS
Status: DISCONTINUED | OUTPATIENT
Start: 2022-11-30 | End: 2022-12-01

## 2022-11-30 RX ORDER — MONTELUKAST SODIUM 10 MG/1
10 TABLET ORAL EVERY EVENING
Status: DISCONTINUED | OUTPATIENT
Start: 2022-11-30 | End: 2022-12-02 | Stop reason: HOSPADM

## 2022-11-30 RX ORDER — BUSPIRONE HYDROCHLORIDE 5 MG/1
5 TABLET ORAL 3 TIMES DAILY
Status: DISCONTINUED | OUTPATIENT
Start: 2022-11-30 | End: 2022-12-01

## 2022-11-30 RX ORDER — DONEPEZIL HYDROCHLORIDE 5 MG/1
5 TABLET, FILM COATED ORAL
Status: DISCONTINUED | OUTPATIENT
Start: 2022-11-30 | End: 2022-12-01

## 2022-11-30 RX ORDER — DOXYCYCLINE HYCLATE 100 MG/1
100 CAPSULE ORAL EVERY 12 HOURS SCHEDULED
Status: DISCONTINUED | OUTPATIENT
Start: 2022-11-30 | End: 2022-11-30

## 2022-11-30 RX ORDER — SERTRALINE HYDROCHLORIDE 25 MG/1
25 TABLET, FILM COATED ORAL DAILY
Status: DISCONTINUED | OUTPATIENT
Start: 2022-11-30 | End: 2022-12-01

## 2022-11-30 RX ORDER — ATORVASTATIN CALCIUM 40 MG/1
40 TABLET, FILM COATED ORAL EVERY EVENING
Status: DISCONTINUED | OUTPATIENT
Start: 2022-11-30 | End: 2022-12-01

## 2022-11-30 RX ORDER — ASPIRIN 81 MG/1
81 TABLET ORAL DAILY
Status: DISCONTINUED | OUTPATIENT
Start: 2022-11-30 | End: 2022-12-01

## 2022-11-30 RX ORDER — FUROSEMIDE 10 MG/ML
40 INJECTION INTRAMUSCULAR; INTRAVENOUS ONCE
Status: COMPLETED | OUTPATIENT
Start: 2022-11-30 | End: 2022-11-30

## 2022-11-30 RX ORDER — DEXTROSE AND SODIUM CHLORIDE 5; .45 G/100ML; G/100ML
75 INJECTION, SOLUTION INTRAVENOUS CONTINUOUS
Status: DISCONTINUED | OUTPATIENT
Start: 2022-11-30 | End: 2022-11-30

## 2022-11-30 RX ADMIN — MONTELUKAST 10 MG: 10 TABLET, FILM COATED ORAL at 17:06

## 2022-11-30 RX ADMIN — FUROSEMIDE 40 MG: 10 INJECTION, SOLUTION INTRAMUSCULAR; INTRAVENOUS at 16:37

## 2022-11-30 RX ADMIN — BUSPIRONE HYDROCHLORIDE 5 MG: 5 TABLET ORAL at 21:28

## 2022-11-30 RX ADMIN — FUROSEMIDE 40 MG: 10 INJECTION, SOLUTION INTRAVENOUS at 07:29

## 2022-11-30 RX ADMIN — DONEPEZIL HYDROCHLORIDE 5 MG: 5 TABLET ORAL at 21:28

## 2022-11-30 RX ADMIN — METOPROLOL TARTRATE 25 MG: 25 TABLET, FILM COATED ORAL at 17:05

## 2022-11-30 RX ADMIN — CEFTRIAXONE SODIUM 2000 MG: 2 INJECTION, POWDER, FOR SOLUTION INTRAMUSCULAR; INTRAVENOUS at 04:39

## 2022-11-30 RX ADMIN — RIVAROXABAN 10 MG: 10 TABLET, FILM COATED ORAL at 17:06

## 2022-11-30 RX ADMIN — BUSPIRONE HYDROCHLORIDE 5 MG: 5 TABLET ORAL at 17:05

## 2022-11-30 RX ADMIN — IOHEXOL 100 ML: 350 INJECTION, SOLUTION INTRAVENOUS at 01:00

## 2022-11-30 NOTE — SEPSIS NOTE
Sepsis Note   Waldemar Patel 79 y o  female MRN: [id number]  Unit/Bed#: ED 16 Encounter: [id number]       qSOFA     Row Name 11/30/22 0345 11/30/22 0300 11/30/22 0246 11/30/22 0140 11/30/22 0107    Altered mental status GCS < 15 -- -- -- -- 1    Respiratory Rate > / =22 0 0 0 0 --    Systolic BP < / =490 1 0 0 0 --    Q Sofa Score 2 1 1 1 1    Row Name 11/30/22 0017 11/30/22 0015             Altered mental status GCS < 15 1 --       Respiratory Rate > / =22 -- 0       Systolic BP < / =421 -- 0       Q Sofa Score 1 0                  Initial Sepsis Screening     Row Name 11/30/22 0400                Is the patient's history suggestive of a new or worsening infection? Yes (Proceed)  -CM        Suspected source of infection pneumonia  -CM        Are two or more of the following signs & symptoms of infection both present and new to the patient? Yes (Proceed)  -CM        Indicate SIRS criteria Hypothermia < 36C (96 8F)  AMS  -CM        If the answer is yes to both questions, suspicion of sepsis is present --        If severe sepsis is present AND tissue hypoperfusion perists in the hour after fluid resuscitation or lactate > 4, the patient meets criteria for SEPTIC SHOCK --        Are any of the following organ dysfunction criteria present within 6 hours of suspected infection and SIRS criteria that are NOT considered to be chronic conditions?  No  -CM        Organ dysfunction --        Date of presentation of severe sepsis --        Time of presentation of severe sepsis --        Tissue hypoperfusion persists in the hour after crystalloid fluid administration, evidenced, by either: --        Was hypotension present within one hour of the conclusion of crystalloid fluid administration? --        Date of presentation of septic shock --        Time of presentation of septic shock --              User Key  (r) = Recorded By, (t) = Taken By, (c) = Cosigned By    [address] Name Provider Type    CM Lesley Pacheco PA-C Physician Assistant

## 2022-11-30 NOTE — H&P
[facility]  H&P- Valarie Marker 1940, 79 y o  female MRN: [id number]  Unit/Bed#: ED 16 Encounter: [id number]  Primary Care Provider: Milana Bialey MD   Date and time admitted to hospital: 11/30/2022 12:11 AM    * Encephalopathy acute  Assessment & Plan  -reportedly very difficult to arouse at her nursing facility  -on my exam patient does groan and open her eyes slightly with sternal rub  -blood pressure 124/59, heart rate 82, respirations 13, 98% on 2 L  -CT head and C-spine negative for acute abnormality  -VBG with evidence of both hypercapnic and hypoxemic respiratory failure (7 29/71/53/34)  -CT chest/abdomen/pelvis: Large bilateral effusions  Bilateral basilar compressive atelectatic changes  Superimposed infection must be excluded clinically    10 mm nodule in the right upper lobe  I suspect this is a focus of pulmonary edema  Recommend imaging follow-up to resolution   -lactic acid 0 7, procalcitonin 0 07 initially; UA ordered and pending  -EKG AFib with PVCs and nonspecific T-wave changes-troponin ordered and pending as well as BNP  -Echo in 2018:  LV normal in size and systolic function  Mild MR, moderate TR, mild pulmonary hypertension  Plan  > admit to medicine step-down level 2 on telemetry given likely need for BiPAP in the setting of hypercapnic respiratory failure caused by volume overload in the setting of underlying pulmonary hypertension and possible underlying pneumonia  Spoke with patient's daughter via telephone about her critical condition and we 1st discussed patient's code status  Patient's daughter reports that she has spoken with her mother about code status recently given that her mother was very ill several months ago and they “almost lost her then"  She reports that her mother is a DNR/DNI now and we have switched her code status to this    Additionally I explained to patient's daughter her elevated CO2 level in the setting of volume overload and possible infection  I explained to her that we would likely need to try and diurese patient while also watching her blood pressure very closely  Additionally, I did inform her that BiPAP in her mother's case comes with a risk of aspiration and that this is a significant risk using BiPAP with her  Daughter reports that she understands the difficult situation and the risk associated with using BiPAP in her mother's case, but reports that the should be attempted  Daughter reports that she is coming to the hospital    > order IV Lasix 40 mg once and assess volume output; if good diuresis and no hypotension, would recommend repeat dosing this afternoon  > will additionally treat for any possible concurrent infection with ceftriaxone and doxycycline  > we will additionally consult our Pulmonary team for their thoughts and any other recommendations  > order repeat 2D echocardiogram    Acute hypercapnic respiratory failure ([facility] )  Assessment & Plan  -see encephalopathy section above    Late onset Alzheimer's disease without behavioral disturbance ([facility] )  Assessment & Plan  -history of Alzheimer's disease  -on PTA BuSpar, Aricept and Zoloft    Chronic a-fib ([facility] )  Assessment & Plan  -continue PTA Lopressor, aspirin and Xarelto    Pulmonary HTN ([facility] )  Assessment & Plan  -history of pulmonary hypertension; noted on echocardiogram 2018  -on PTA Lasix 40 mg oral daily  Plan  > as above in respiratory failure section          VTE Prophylaxis: Rivaroxaban (Xarelto)  / sequential compression device   Code Status: Level 3 - DNAR and DNI confirmed with daughter via telephone      Anticipated Length of Stay:  Patient will be admitted on an Observation basis with an anticipated length of stay of  < 2 midnights  Justification for Hospital Stay: Please see detailed plans noted above      Chief Complaint:     Encephalopathy, hypercapnic and hypoxic respiratory failure, concern for infection   History of Present Illness:  Jeovanny Yamileth is a 79 y o  female who has past medical history significant for Alzheimer's disease, pulmonary hypertension, paroxysmal atrial fibrillation, embolic CVA who presented to [facility] ER on the morning of 11/30 from her skilled nursing facility with decreased responsiveness that began earlier in the day  Per patient's daughter who I spoke with on the telephone, she received phone call from the nursing facility that her mother was difficult to arouse and as such they were sending her to the hospital   Patient's daughter reports that her mother was seen in the ER in a different hospital system about a week ago for chest pain and discharged home  Patient's daughter reports that she went to visit her mother at the facility on Thanksgiving and that her mother was at her baseline  Patient's daughter does report that her mother had episode of sepsis several months ago for which the family was concerned she might not make it  After this, patient's daughter reports that she spoke with her mother about code status and they made her a DNR/DNI  Review of Systems: Unable to be completed 2/2 Altered Mental Status  Past Medical and Surgical History:   Past Medical History:   Diagnosis Date   • Alzheimer's dementia ([facility] )    • Atrial fibrillation ([facility] )    • Depression    • Hyperlipemia    • Hypertension      Past Surgical History:   Procedure Laterality Date   • BREAST SURGERY         Meds/Allergies:  No current facility-administered medications on file prior to encounter       Current Outpatient Medications on File Prior to Encounter   Medication Sig Dispense Refill   • aspirin (ECOTRIN LOW STRENGTH) 81 mg EC tablet Take 1 tablet (81 mg total) by mouth daily 30 tablet 11   • atorvastatin (LIPITOR) 40 mg tablet Take 1 tablet (40 mg total) by mouth every evening 30 tablet 11   • busPIRone (BUSPAR) 5 mg tablet Take 5 mg by mouth 3 (three) times a day     • clindamycin (CLEOCIN) 300 MG capsule Take 300 mg by mouth 3 (three) times a day     • donepezil (ARICEPT) 5 mg tablet Take 5 mg by mouth daily at bedtime     • escitalopram (LEXAPRO) 5 mg tablet  (Patient not taking: Reported on 7/23/2022)     • furosemide (LASIX) 20 mg tablet Take 1 tablet (20 mg total) by mouth daily (Patient taking differently: Take 40 mg by mouth daily) 90 tablet 3   • Glucosamine Sulfate 500 MG TABS Take by mouth Daily     • menthol-zinc oxide (Calmoseptine) 0 44-20 6 % OINT Apply topically 2 (two) times a day     • metoprolol tartrate (LOPRESSOR) 25 mg tablet Take 25 mg by mouth 2 (two) times a day     • montelukast (SINGULAIR) 10 mg tablet Take 10 mg by mouth every evening  0   • mupirocin (BACTROBAN) 2 % ointment Apply topically 3 (three) times a day     • OLANZapine (ZyPREXA) 2 5 mg tablet Take 2 5 mg by mouth daily at bedtime (Patient not taking: Reported on 7/23/2022)     • potassium chloride (K-DUR,KLOR-CON) 10 mEq tablet Take 10 mEq by mouth 2 (two) times a day     • RESVERATROL PO Take 1,000 mg by mouth daily (Patient not taking: Reported on 7/23/2022)     • rivaroxaban (XARELTO) 20 mg tablet Take 20 mg by mouth daily     • senna (SENOKOT) 8 6 MG tablet Take 1 tablet by mouth daily     • sertraline (ZOLOFT) 25 mg tablet Take 25 mg by mouth daily     • Tetrahydrozoline HCl (EYE DROPS OP) Apply to eye (Patient not taking: Reported on 7/23/2022)     • timolol (TIMOPTIC-XE) 0 5 % ophthalmic gel-forming  (Patient not taking: Reported on 7/23/2022)       Allergies: No Known Allergies    History:  Marital Status: Single     Substance Use History:   Social History     Substance and Sexual Activity   Alcohol Use No     Social History     Tobacco Use   Smoking Status Never   Smokeless Tobacco Never     Social History     Substance and Sexual Activity   Drug Use No       Family History:  Family History   Problem Relation Age of Onset   • Parkinsonism Brother    • Dementia Brother        Physical Exam:     Vitals:   Blood Pressure: 135/60 (11/30/22 8938)  Pulse: 82 (11/30/22 0615)  Temperature: (!) 95 1 °F (35 1 °C) (provider aware) (11/30/22 0156)  Temp Source: Rectal (11/30/22 0156)  Respirations: 17 (11/30/22 0615)  Height: 4' 10" (147 3 cm) (11/30/22 6345)  Weight - Scale: 70 kg (154 lb 5 2 oz) (11/30/22 0614)  SpO2: 98 % (11/30/22 0615)    Constitutional:  Somnolent, Difficult to Arouse, Opens eyes with sternal rub  Eyes:  Ocular reflex in tact   HENT:   oropharynx dry, external ears normal, external nose normal   Respiratory:  Crackles bilaterally, audible breath sounds bilaterally   Cardiovascular:  Normal rate, no murmurs   GI:  Soft, nondistended, no guarding   :  No costovertebral angle tenderness   Musculoskeletal:  Passive movement of extremities performed without patient expressing discomfort  Integument:  no jaundice, no rash   Neurologic: Somnolent, Difficult to arouse but does open eyes with sternal rub, encephalopathic, moves extremities spontaneously        Lab Results: I have personally reviewed pertinent reports  Results from last 7 days   Lab Units 11/30/22  0030   WBC Thousand/uL 4 64   HEMOGLOBIN g/dL 10 6*   HEMATOCRIT % 34 4*   PLATELETS Thousands/uL 196   NEUTROS PCT % 56   LYMPHS PCT % 28   MONOS PCT % 10   EOS PCT % 4     Results from last 7 days   Lab Units 11/30/22  0030   POTASSIUM mmol/L 4 5   CHLORIDE mmol/L 108   CO2 mmol/L 37*   BUN mg/dL 21   CREATININE mg/dL 1 30   CALCIUM mg/dL 9 4   ALK PHOS U/L 111   ALT U/L 11*   AST U/L 16     Results from last 7 days   Lab Units 11/30/22  0428   INR  1 72*         Imaging: I have personally reviewed pertinent reports  CT head without contrast    Result Date: 11/30/2022  Narrative: CT BRAIN - WITHOUT CONTRAST INDICATION:   Head trauma, minor (Age >= 65y) AMS, possible trauma  COMPARISON:  7/23/2022  TECHNIQUE:  CT examination of the brain was performed  In addition to axial images, sagittal and coronal 2D reformatted images were created and submitted for interpretation  Radiation dose length product (DLP) for this visit:  904 mGy-cm   This examination, like all CT scans performed in the [facility], was performed utilizing techniques to minimize radiation dose exposure, including the use of iterative reconstruction and automated exposure control  IMAGE QUALITY:  Diagnostic  FINDINGS: PARENCHYMA:  Stable punctate chronic infarcts in the left cerebellar hemisphere and chronic lacunar infarct in the right caudate body  Periventricular and subcortical hypoattenuating foci consistent with microangiopathic disease  No acute intracranial hemorrhage or mass effect  VENTRICLES AND EXTRA-AXIAL SPACES:  No hydrocephalus or extra-axial collection  VISUALIZED ORBITS AND PARANASAL SINUSES:  Intact globes and orbits  Clear paranasal sinuses  CALVARIUM AND EXTRACRANIAL SOFT TISSUES:  No acute calvarial fracture  Impression: No acute intracranial abnormality  Workstation performed: [id number]     CT cervical spine without contrast    Result Date: 11/30/2022  Narrative: CT CERVICAL SPINE - WITHOUT CONTRAST INDICATION:   Neck trauma (Age >= 65y) AMS, trauma  COMPARISON:  7/23/2022  TECHNIQUE:  CT examination of the cervical spine was performed without intravenous contrast   Contiguous axial images were obtained  Sagittal and coronal reconstructions were performed  Radiation dose length product (DLP) for this visit:  391 mGy-cm   This examination, like all CT scans performed in the [facility], was performed utilizing techniques to minimize radiation dose exposure, including the use of iterative reconstruction and automated exposure control  IMAGE QUALITY:  Diagnostic  FINDINGS: ALIGNMENT:  Increased cervical lordosis secondary to thoracic kyphosis  VERTEBRAL BODIES:  No acute cervical spine fracture   DEGENERATIVE CHANGES:  Disc and facet osteophytosis, most prominent at C5-6 and C6-7 resulting in at least moderate central canal stenosis and neural foraminal narrowing  PREVERTEBRAL AND PARASPINAL SOFT TISSUES:  No soft tissue swelling  THORACIC INLET:  Clear lung apices  Impression: No acute cervical spine fracture or traumatic malalignment  Workstation performed: [id number]     CT chest abdomen pelvis w contrast    Result Date: 11/30/2022  Narrative: CT CHEST, ABDOMEN AND PELVIS WITH IV CONTRAST INDICATION:   AMS, possible trauma  COMPARISON:  None  TECHNIQUE: CT examination of the chest, abdomen and pelvis was performed  Axial, sagittal, and coronal 2D reformatted images were created from the source data and submitted for interpretation  Radiation dose length product (DLP) for this visit:  570 mGy-cm   This examination, like all CT scans performed in the [facility], was performed utilizing techniques to minimize radiation dose exposure, including the use of iterative reconstruction and automated exposure control  IV Contrast:  100 mL of iohexol (OMNIPAQUE) Enteric Contrast: Enteric contrast was administered  FINDINGS: CHEST LUNGS:  Bilateral basilar compressive atelectatic changes  Superimposed infection must be excluded clinically    10 mm nodule in the right upper lobe  I suspect this is a focus of pulmonary edema  Recommend follow-up to resolution  There is no tracheal or  endobronchial lesion  PLEURA:  Large bilateral effusions  Thelda Fare HEART/GREAT VESSELS: Heart is unremarkable for patient's age  No thoracic aortic aneurysm  MEDIASTINUM AND MORENO:  Unremarkable  CHEST WALL AND LOWER NECK:  Unremarkable  ABDOMEN LIVER/BILIARY TREE:  Unremarkable   GALLBLADDER:  Gallbladder is surgically absent  SPLEEN:  Unremarkable  PANCREAS:  Unremarkable  ADRENAL GLANDS:  Unremarkable  KIDNEYS/URETERS:  Unremarkable  No hydronephrosis  STOMACH AND BOWEL:  Unremarkable  APPENDIX:  A normal appendix was visualized  ABDOMINOPELVIC CAVITY:  No ascites  No pneumoperitoneum  No lymphadenopathy  VESSELS:  Unremarkable for patient's age   PELVIS REPRODUCTIVE ORGANS: Unremarkable for patient's age  URINARY BLADDER:  Unremarkable  ABDOMINAL WALL/INGUINAL REGIONS:  Unremarkable  OSSEOUS STRUCTURES:  No acute fracture or destructive osseous lesion  Impression: Large bilateral effusions     Bilateral basilar compressive atelectatic changes  Superimposed infection must be excluded clinically    10 mm nodule in the right upper lobe  I suspect this is a focus of pulmonary edema  Recommend imaging follow-up to resolution  The study was marked in [facility] for immediate notification  Workstation performed: [id number]       Total time for visit, including counseling/coordination of care: 60 minutes  Greater than 50% of this total time spent on direct patient counseling and coorination of care  Epic Records Reviewed as well as Records in Care Everywhere    ** Please Note: Dragon 360 Dictation voice to text software was used in the creation of this document   **

## 2022-11-30 NOTE — CONSULTS
Pulmonary Consultation   Leobardo Cheng 79 y o  female MRN: [id number]   [place] [id number] Encounter: [id number]      Reason for consultation:   Hypercapnic respiratory failure  Requesting physician:   Fareed Thao DO      Impressions:   • Most likely acute on chronic hypercapnic respiratory failure  • Bilateral pleural effusions  • Chronic diastolic congestive heart failure  • Encephalopathy  Multifactorial   • Alzheimer's dementia  Advance  Recommendations:  • Will take her off the BiPAP  Transition her to nasal cannula and titrate for O2 saturation above 88%  • Monitor neuro status  • No indication for thoracentesis  • Consider diuresis  History of Present Illness   HPI:  Leobardo Cheng is a 79 y o  female who was admitted because of altered mental status  The patient has advanced dementia and she lives in a skilled nursing facility  She was found to be altered and was brought to the hospital   She had venous blood gases done in the morning and it showed hypercapnia  She was placed on BiPAP  When I saw the patient she was on BiPAP  The daughter was at the bedside  The patient was responding by nodding her head  She seemed to be comfortable  She has a history of CVA in 2018  Since then she was found to have dementia which has progressively worsened  She has atrial fibrillation  Review of systems:  12 point review of systems was completed and was otherwise negative except as listed in HPI  Historical Information   Past Medical History:   Diagnosis Date   • Alzheimer's dementia ([address] )    • Atrial fibrillation ([facility] )    • Depression    • Hyperlipemia    • Hypertension      Past Surgical History:   Procedure Laterality Date   • BREAST SURGERY       Family History   Problem Relation Age of Onset   • Parkinsonism Brother    • Dementia Brother        Family History:  No family history of chronic lung disease  Social History:  She lives in the skilled nursing facility    She is a lifelong nonsmoker  Meds/Allergies   Current Facility-Administered Medications   Medication Dose Route Frequency   • aspirin (ECOTRIN LOW STRENGTH) EC tablet 81 mg  81 mg Oral Daily   • atorvastatin (LIPITOR) tablet 40 mg  40 mg Oral QPM   • busPIRone (BUSPAR) tablet 5 mg  5 mg Oral TID   • donepezil (ARICEPT) tablet 5 mg  5 mg Oral HS   • furosemide (LASIX) injection 40 mg  40 mg Intravenous BID (diuretic)   • metoprolol tartrate (LOPRESSOR) tablet 25 mg  25 mg Oral BID   • montelukast (SINGULAIR) tablet 10 mg  10 mg Oral QPM   • rivaroxaban (XARELTO) tablet 10 mg  10 mg Oral Daily With Dinner   • sertraline (ZOLOFT) tablet 25 mg  25 mg Oral Daily     Medications Prior to Admission   Medication   • aspirin (ECOTRIN LOW STRENGTH) 81 mg EC tablet   • atorvastatin (LIPITOR) 40 mg tablet   • busPIRone (BUSPAR) 5 mg tablet   • clindamycin (CLEOCIN) 300 MG capsule   • donepezil (ARICEPT) 5 mg tablet   • escitalopram (LEXAPRO) 5 mg tablet   • furosemide (LASIX) 20 mg tablet   • Glucosamine Sulfate 500 MG TABS   • menthol-zinc oxide (Calmoseptine) 0 44-20 6 % OINT   • metoprolol tartrate (LOPRESSOR) 25 mg tablet   • montelukast (SINGULAIR) 10 mg tablet   • mupirocin (BACTROBAN) 2 % ointment   • OLANZapine (ZyPREXA) 2 5 mg tablet   • potassium chloride (K-DUR,KLOR-CON) 10 mEq tablet   • RESVERATROL PO   • rivaroxaban (XARELTO) 20 mg tablet   • senna (SENOKOT) 8 6 MG tablet   • sertraline (ZOLOFT) 25 mg tablet   • Tetrahydrozoline HCl (EYE DROPS OP)   • timolol (TIMOPTIC-XE) 0 5 % ophthalmic gel-forming     No Known Allergies    Vitals: Blood pressure 112/68, pulse (!) 109, temperature 97 9 °F (36 6 °C), temperature source Axillary, resp   rate 18, height 4' 10" (1 473 m), weight 69 9 kg (154 lb), SpO2 94 %, not currently breastfeeding ,      Intake/Output Summary (Last 24 hours) at 11/30/2022 1039  Last data filed at 11/30/2022 1700  Gross per 24 hour   Intake 50 ml   Output 628 ml   Net -578 ml       Physical exam: Head/eyes:    Normocephalic, without obvious abnormality, atraumatic,         PERRL, extraocular muscles intact, no scleral icterus    Nose:   Nares normal, septum midline, mucosa normal, no drainage    or sinus tenderness   Throat:   Moist mucous membranes, no thrush   Neck:   Supple, trachea midline, no adenopathy; no carotid    bruit or JVD   Lungs:     Decreased breath sounds  No wheezing or rhonchi  Heart:    Irregular rate and rhythm, S1 and S2 normal, no murmur, rub   or gallop   Abdomen:     Soft, non-tender, bowel sounds active all four quadrants,     no masses, no organomegaly   Extremities:   Extremities normal, atraumatic, no cyanosis or edema   Skin:   Warm, dry, turgor normal, no rashes or lesions   Neurologic:   CNII-XII intact, normal strength, non-focal             Labs:   CBC:   Lab Results   Component Value Date    WBC 4 93 11/30/2022    HGB 9 8 (L) 11/30/2022    HCT 31 7 (L) 11/30/2022     (H) 11/30/2022     11/30/2022    MCH 33 0 11/30/2022    MCHC 30 9 (L) 11/30/2022    RDW 15 1 11/30/2022    MPV 9 2 11/30/2022    NRBC 0 11/30/2022   , CMP:   Lab Results   Component Value Date    SODIUM 148 (H) 11/30/2022    K 3 8 11/30/2022     (H) 11/30/2022    CO2 36 (H) 11/30/2022    BUN 19 11/30/2022    CREATININE 1 21 11/30/2022    CALCIUM 8 8 11/30/2022    AST 15 11/30/2022    ALT 13 11/30/2022    ALKPHOS 97 11/30/2022    EGFR 41 11/30/2022 11/30/2022 0623 11/30/2022 0815 NT-BNP PRO [[id number]]   (Abnormal)   Blood from Arm, Left    Final result Component Value Units   NT-proBNP 1,940 High  pg/mL            Imaging and other studies: I have personally reviewed pertinent films in PACS CT of the chest is reviewed on the [facility] system  It showed bilateral pleural effusions            Terrence Chandra MD

## 2022-11-30 NOTE — PLAN OF CARE
Problem: Potential for Falls  Goal: Patient will remain free of falls  Description: INTERVENTIONS:  - Educate patient/family on patient safety including physical limitations  - Instruct patient to call for assistance with activity   - Consult OT/PT to assist with strengthening/mobility   - Keep Call bell within reach  - Keep bed low and locked with side rails adjusted as appropriate  - Keep care items and personal belongings within reach  - Initiate and maintain comfort rounds  - Make Fall Risk Sign visible to staff  - Initiate/Maintain bed alarm  - Apply yellow socks and bracelet for high fall risk patients  - Consider moving patient to room near nurses station  Outcome: Progressing     Problem: MOBILITY - ADULT  Goal: Maintain or return to baseline ADL function  Description: INTERVENTIONS:  -  Assess patient's ability to carry out ADLs; assess patient's baseline for ADL function and identify physical deficits which impact ability to perform ADLs (bathing, care of mouth/teeth, toileting, grooming, dressing, etc )  - Assess/evaluate cause of self-care deficits   - Assess range of motion  - Assess patient's mobility; develop plan if impaired  - Assess patient's need for assistive devices and provide as appropriate  - Encourage maximum independence but intervene and supervise when necessary  - Involve family in performance of ADLs  - Assess for home care needs following discharge   - Consider OT consult to assist with ADL evaluation and planning for discharge  - Provide patient education as appropriate  Outcome: Progressing  Goal: Maintains/Returns to pre admission functional level  Description: INTERVENTIONS:  - Perform BMAT or MOVE assessment daily    - Set and communicate daily mobility goal to care team and patient/family/caregiver  - Collaborate with rehabilitation services on mobility goals if consulted  - Reposition patient every 2 hours    - Out of bed for toileting  - Record patient progress and toleration of activity level   Outcome: Progressing     Problem: PAIN - ADULT  Goal: Verbalizes/displays adequate comfort level or baseline comfort level  Description: Interventions:  - Encourage patient to monitor pain and request assistance  - Assess pain using appropriate pain scale  - Administer analgesics based on type and severity of pain and evaluate response  - Implement non-pharmacological measures as appropriate and evaluate response  - Consider cultural and social influences on pain and pain management  - Notify physician/advanced practitioner if interventions unsuccessful or patient reports new pain  Outcome: Progressing     Problem: INFECTION - ADULT  Goal: Absence or prevention of progression during hospitalization  Description: INTERVENTIONS:  - Assess and monitor for signs and symptoms of infection  - Monitor lab/diagnostic results  - Monitor all insertion sites, i e  indwelling lines, tubes, and drains  - Monitor endotracheal if appropriate and nasal secretions for changes in amount and color  - [place] appropriate cooling/warming therapies per order  - Administer medications as ordered  - Instruct and encourage patient and family to use good hand hygiene technique  - Identify and instruct in appropriate isolation precautions for identified infection/condition  Outcome: Progressing  Goal: Absence of fever/infection during neutropenic period  Description: INTERVENTIONS:  - Monitor WBC    Outcome: Progressing     Problem: SAFETY ADULT  Goal: Patient will remain free of falls  Description: INTERVENTIONS:  - Educate patient/family on patient safety including physical limitations  - Instruct patient to call for assistance with activity   - Consult OT/PT to assist with strengthening/mobility   - Keep Call bell within reach  - Keep bed low and locked with side rails adjusted as appropriate  - Keep care items and personal belongings within reach  - Initiate and maintain comfort rounds  - Make Fall Risk Sign visible to staff  - Initiate/Maintain bed alarm  - Apply yellow socks and bracelet for high fall risk patients  - Consider moving patient to room near nurses station  Outcome: Progressing  Goal: Maintain or return to baseline ADL function  Description: INTERVENTIONS:  -  Assess patient's ability to carry out ADLs; assess patient's baseline for ADL function and identify physical deficits which impact ability to perform ADLs (bathing, care of mouth/teeth, toileting, grooming, dressing, etc )  - Assess/evaluate cause of self-care deficits   - Assess range of motion  - Assess patient's mobility; develop plan if impaired  - Assess patient's need for assistive devices and provide as appropriate  - Encourage maximum independence but intervene and supervise when necessary  - Involve family in performance of ADLs  - Assess for home care needs following discharge   - Consider OT consult to assist with ADL evaluation and planning for discharge  - Provide patient education as appropriate  Outcome: Progressing  Goal: Maintains/Returns to pre admission functional level  Description: INTERVENTIONS:  - Perform BMAT or MOVE assessment daily    - Set and communicate daily mobility goal to care team and patient/family/caregiver  - Collaborate with rehabilitation services on mobility goals if consulted  - Reposition patient every 2 hours    - Out of bed for toileting  - Record patient progress and toleration of activity level   Outcome: Progressing     Problem: DISCHARGE PLANNING  Goal: Discharge to home or other facility with appropriate resources  Description: INTERVENTIONS:  - Identify barriers to discharge w/patient and caregiver  - Arrange for needed discharge resources and transportation as appropriate  - Identify discharge learning needs (meds, wound care, etc )  - Arrange for interpretive services to assist at discharge as needed  - Refer to Case Management Department for coordinating discharge planning if the patient needs post-hospital services based on physician/advanced practitioner order or complex needs related to functional status, cognitive ability, or social support system  Outcome: Progressing     Problem: Knowledge Deficit  Goal: Patient/family/caregiver demonstrates understanding of disease process, treatment plan, medications, and discharge instructions  Description: Complete learning assessment and assess knowledge base    Interventions:  - Provide teaching at level of understanding  - Provide teaching via preferred learning methods  Outcome: Progressing     Problem: MOBILITY - ADULT  Goal: Maintain or return to baseline ADL function  Description: INTERVENTIONS:  -  Assess patient's ability to carry out ADLs; assess patient's baseline for ADL function and identify physical deficits which impact ability to perform ADLs (bathing, care of mouth/teeth, toileting, grooming, dressing, etc )  - Assess/evaluate cause of self-care deficits   - Assess range of motion  - Assess patient's mobility; develop plan if impaired  - Assess patient's need for assistive devices and provide as appropriate  - Encourage maximum independence but intervene and supervise when necessary  - Involve family in performance of ADLs  - Assess for home care needs following discharge   - Consider OT consult to assist with ADL evaluation and planning for discharge  - Provide patient education as appropriate  Outcome: Progressing     Problem: PAIN - ADULT  Goal: Verbalizes/displays adequate comfort level or baseline comfort level  Description: Interventions:  - Encourage patient to monitor pain and request assistance  - Assess pain using appropriate pain scale  - Administer analgesics based on type and severity of pain and evaluate response  - Implement non-pharmacological measures as appropriate and evaluate response  - Consider cultural and social influences on pain and pain management  - Notify physician/advanced practitioner if interventions unsuccessful or patient reports new pain  Outcome: Progressing     Problem: INFECTION - ADULT  Goal: Absence or prevention of progression during hospitalization  Description: INTERVENTIONS:  - Assess and monitor for signs and symptoms of infection  - Monitor lab/diagnostic results  - Monitor all insertion sites, i e  indwelling lines, tubes, and drains  - Monitor endotracheal if appropriate and nasal secretions for changes in amount and color  - [place] appropriate cooling/warming therapies per order  - Administer medications as ordered  - Instruct and encourage patient and family to use good hand hygiene technique  - Identify and instruct in appropriate isolation precautions for identified infection/condition  Outcome: Progressing     Problem: SAFETY ADULT  Goal: Patient will remain free of falls  Description: INTERVENTIONS:  - Educate patient/family on patient safety including physical limitations  - Instruct patient to call for assistance with activity   - Consult OT/PT to assist with strengthening/mobility   - Keep Call bell within reach  - Keep bed low and locked with side rails adjusted as appropriate  - Keep care items and personal belongings within reach  - Initiate and maintain comfort rounds  - Make Fall Risk Sign visible to staff  - Initiate/Maintain bed alarm  - Apply yellow socks and bracelet for high fall risk patients  - Consider moving patient to room near nurses station  Outcome: Progressing

## 2022-11-30 NOTE — ED NOTES
[address] [facility] [address] at this time     Maria Luz Part, [facility]  11/30/22 [id number]

## 2022-11-30 NOTE — ASSESSMENT & PLAN NOTE
-reportedly very difficult to arouse at her nursing facility  -on my exam patient does groan and open her eyes slightly with sternal rub  -blood pressure 124/59, heart rate 82, respirations 13, 98% on 2 L  -CT head and C-spine negative for acute abnormality  -VBG with evidence of both hypercapnic and hypoxemic respiratory failure (7 29/71/53/34)  -CT chest/abdomen/pelvis: Large bilateral effusions  Bilateral basilar compressive atelectatic changes  Superimposed infection must be excluded clinically    10 mm nodule in the right upper lobe  I suspect this is a focus of pulmonary edema  Recommend imaging follow-up to resolution   -lactic acid 0 7, procalcitonin 0 07 initially; UA ordered and pending  -EKG AFib with PVCs and nonspecific T-wave changes-troponin ordered and pending as well as BNP  -Echo in 2018:  LV normal in size and systolic function  Mild MR, moderate TR, mild pulmonary hypertension  Plan  > admit to medicine step-down level 2 on telemetry given likely need for BiPAP in the setting of hypercapnic respiratory failure caused by volume overload in the setting of underlying pulmonary hypertension and possible underlying pneumonia  Spoke with patient's daughter via telephone about her critical condition and we 1st discussed patient's code status  Patient's daughter reports that she has spoken with her mother about code status recently given that her mother was very ill several months ago and they “almost lost her then"  She reports that her mother is a DNR/DNI now and we have switched her code status to this  Additionally I explained to patient's daughter her elevated CO2 level in the setting of volume overload and possible infection  I explained to her that we would likely need to try and diurese patient while also watching her blood pressure very closely    Additionally, I did inform her that BiPAP in her mother's case comes with a risk of aspiration and that this is a significant risk using BiPAP with her  Daughter reports that she understands the difficult situation and the risk associated with using BiPAP in her mother's case, but reports that the should be attempted   Daughter reports that she is coming to the hospital    > order IV Lasix 40 mg once and assess volume output; if good diuresis and no hypotension, would recommend repeat dosing this afternoon  > will additionally treat for any possible concurrent infection with ceftriaxone and doxycycline  > we will additionally consult our Pulmonary team for their thoughts and any other recommendations  > order repeat 2D echocardiogram

## 2022-11-30 NOTE — QUICK NOTE
77-year-old female presents from nursing home for increasing lethargy, altered mental status  Patient is found to be hypercapnic with pCO2 in the 70s on VBG  CT imaging negative for pulmonary embolism but did show bilateral large pleural effusions  ProBNP elevated, prior 2D echo showing normal EF with moderate dilated left atrium, and no other significant valvular abnormality  Patient was started on BiPAP, and given IV Lasix for which patient had good urine output  Assessment/plan:  Acute encephalopathy  Acute hypercapnic respiratory failure  Large Bilateral pleural effusions  Alzheimer's dementia  Chronic AFib on Xarelto    Patient appears to be responding to IV Lasix, will continue 40 b i d   CT imaging personally reviewed, showing large bilateral pleural effusions  2D echo ordered and pending  Currently on BiPAP, wean to nasal cannula as able, repeat VBG in a [facility] consulted, with appreciate recommendations  Currently NPO, would recommend bedside swallow evaluation once awake, alert and following commands      Non billable note

## 2022-11-30 NOTE — ED NOTES
Patient transported to [address] [place] [address], [address] [facility]  11/30/22 0065

## 2022-11-30 NOTE — PHYSICAL THERAPY NOTE
PHYSICAL THERAPY NOTE          Patient Name: Kya Massey  DTHYX'V Date: 11/30/2022 11/30/22 1343   PT Last Visit   PT Visit Date 11/30/22   Note Type   Note type Cancelled Session   Cancel Reasons Medical status   Additional Comments PT consult received  Pt currently on BiPAP & not medically appropriate for PT eval hence will defer PT eval at this time  Will continue to follow as appropriate     Alysha Lanier

## 2022-11-30 NOTE — SPEECH THERAPY NOTE
Speech Language/Pathology  Speech/Language Pathology  Assessment    Patient Name: Ally Shook  Today's Date: 11/30/2022     Problem List  Principal Problem:    Encephalopathy acute  Active Problems:    Pulmonary HTN ([facility] [address] )    Chronic a-fib ([facility] [address] )    Late onset Alzheimer's disease without behavioral disturbance ([facility] [address] )    Acute hypercapnic respiratory failure ([facility] [address] )    Past Medical History  Past Medical History:   Diagnosis Date   • Alzheimer's dementia ([facility] [address] )    • Atrial fibrillation ([facility] [address] )    • Depression    • Hyperlipemia    • Hypertension      Past Surgical History  Past Surgical History:   Procedure Laterality Date   • BREAST SURGERY          Bedside Swallow Evaluation:    Summary:  Pt presented w/ eyes closed  opened a few times independently but not when cued  Family at bedside  Reported pt was tolerating a Regular diet prior  With verbal cues and prompts  Pt was able to finish her chocolate pudding  Needed cues to open mouth, which was often not very much  Also needed periodic cues to swallow  Co cough or wet vocal quality  Inconsistently able to suck nectar from the straw  Liquids were accepted better by tsp  Probable reduced control w/ spill  Family educated on her transferring/ or spilling the bolus but not swallowing yet promptly  They were in good understanding and watched for swallow as well  Mild cough x 2  Mod oral stage, mild/mod pharyngeal  Variable alertness  Recommendations:  Diet: puree (when alert and as tolerated)  Liquid: nectar (when alert as as tolerated)  Stop feeding if coughing  Meds: crush or iv  Supervision: full  Must be fed  Positioning:Upright  Strategies: allow extra time to swallow/watch for swallow/cue to swallow  Pt to take PO/Meds only when fully alert and upright  Oral care  Aspiration precautions  Reflux precautions  Therapy Prognosis: guarded at this time  Depends on status over the next few days  Frequency: 2-5x/wk as appropriate and able       Consider consult w/:  Nutrition    Goal(s):  Dysphagia LTG  -Patient will demonstrate safe and effective oral intake (without overt s/s significant oral/pharyngeal dysphagia including s/s penetration or aspiration) for the highest appropriate diet level  1 Pt will tolerate least restrictive diet w/out s/s aspiration or oral/pharyngeal difficulties  2 Pt will will effectively manipulate/masticate and transfer purees/solids w/out s/s dysphagia/aspiration  3 Pt will tolerate nectar thick---> liquids w/out s/s aspiration  H&P/Admit info/ pertinent provider notes: (PMH noted above)  Chief Complaint:     Encephalopathy, hypercapnic and hypoxic respiratory failure, concern for infection   History of Present Illness:  Carolynn Salcedo is a 79 y o  female who has past medical history significant for Alzheimer's disease, pulmonary hypertension, paroxysmal atrial fibrillation, embolic CVA who presented to [facility] ER on the morning of 11/30 from her skilled nursing facility with decreased responsiveness that began earlier in the day  Per patient's daughter who I spoke with on the telephone, she received phone call from the nursing facility that her mother was difficult to arouse and as such they were sending her to the hospital   Patient's daughter reports that her mother was seen in the ER in a different hospital system about a week ago for chest pain and discharged home  Patient's daughter reports that she went to visit her mother at the facility on Thanksgiving and that her mother was at her baseline  Patient's daughter does report that her mother had episode of sepsis several months ago for which the family was concerned she might not make it  After this, patient's daughter reports that she spoke with her mother about code status and they made her a DNR/DNI  Per pulmonary 11/30/22:  Impressions:   • Most likely acute on chronic hypercapnic respiratory failure  • Bilateral pleural effusions    • Chronic diastolic congestive heart failure  • Encephalopathy  Multifactorial   • Alzheimer's dementia  Advance      Recommendations:  • Will take her off the BiPAP  Transition her to nasal cannula and titrate for O2 saturation above 88%  • Monitor neuro status  • No indication for thoracentesis  • Consider diuresis  •   Special Studies:  Ct chest/abd/pelvis 11/30  Large bilateral effusions     Bilateral basilar compressive atelectatic changes  Superimposed infection must be excluded clinically    10 mm nodule in the right upper lobe  I suspect this is a focus of pulmonary edema  Recommend imaging follow-up to   resolution  CT c spine:11/30  DEGENERATIVE CHANGES:  Disc and facet osteophytosis, most prominent at C5-6 and C6-7 resulting in at least moderate central canal stenosis and neural foraminal narrowing  IMPRESSION:  No acute cervical spine fracture or traumatic malalignment  Ct head 11/30:  Neg acute    Previous VBS:  none    Patient's goal: none stated    Did the pt report pain?stated "it hurts" x 1 but could not tell me where  No grimace or nonverbal indication  She was trying to take off her o2 and the sensor on her finger  Possibly those  If yes, was nursing notified/was it addressed? yes    Reason for consult:  R/o aspiration  Determine safest and least restrictive diet  Change in mental status  H/o neurological disease  respiratory compromise  98 on 2 liters    Precautions:  Aspiration  Fall      Food Allergies: none   Current Diet: Level 2 mechanical w/ nectar   Premorbid diet: Regular w/ thin   O2 requirement: 2L   Social/Prior living [facility] and beyond at the [facility]   Voice/Speech: Hypophonic    Follows commands: poorly   Cognitive status: In and out of alertness   Responded to BASIC ?s wfl     Oral mech exam:  Dentition:minimal natural  Lips (VII):no gross asymmetry  Secretion management:mouth mildly dry    Esophageal stage:  No s/s reported    Results d/w:  Pt, nursing, family, physician

## 2022-11-30 NOTE — OCCUPATIONAL THERAPY NOTE
Occupational Therapy         Patient Name: Carl Chung  ZLQDM'E Date: 11/30/2022      MD's orders received  Pt currently on bi-pap  Will continue when medically appropriate   Elvis Martin

## 2022-11-30 NOTE — ED PROVIDER NOTES
History  Chief Complaint   Patient presents with   • Medical Problem     Pt arrived via EMS from [facility]  Per EMS, staff found pt unresponsive for an unknown amount of time but responds to painful stimuli  Per staff at [facility], pt has been recently getting over a cold  This is an 67-year-old female with past medical history of AFib, HTN, Alzheimer's dementia, HLD presenting from [facility] for evaluation of altered mental status  Patient was not responding to staff, was only responding to painful stimuli  Staff at nursing home states this is not like the patient  It denies any falls or injuries recently  Patient was otherwise acting normal earlier today  No other history as patient is not verbally responsive at this time  History provided by:  EMS personnel and nursing home  History limited by:  Mental status change  Medical Problem  Location:  Altered mental status  Onset quality:  Sudden  Timing:  Constant  Progression:  Unchanged  Chronicity:  Recurrent  Context:  Altered mental status, Alzheimer's dementia      Prior to Admission Medications   Prescriptions Last Dose Informant Patient Reported? Taking?    Glucosamine Sulfate 500 MG TABS   Yes No   Sig: Take by mouth Daily   OLANZapine (ZyPREXA) 2 5 mg tablet   Yes No   Sig: Take 2 5 mg by mouth daily at bedtime   Patient not taking: Reported on 7/23/2022   RESVERATROL PO   Yes No   Sig: Take 1,000 mg by mouth daily   Patient not taking: Reported on 7/23/2022   Tetrahydrozoline HCl (EYE DROPS OP)   Yes No   Sig: Apply to eye   Patient not taking: Reported on 7/23/2022   aspirin (ECOTRIN LOW STRENGTH) 81 mg EC tablet   No Yes   Sig: Take 1 tablet (81 mg total) by mouth daily   atorvastatin (LIPITOR) 40 mg tablet   No Yes   Sig: Take 1 tablet (40 mg total) by mouth every evening   busPIRone (BUSPAR) 5 mg tablet   Yes No   Sig: Take 5 mg by mouth 3 (three) times a day   clindamycin (CLEOCIN) 300 MG capsule   Yes No   Sig: Take 300 mg by mouth 3 (three) times a day   donepezil (ARICEPT) 5 mg tablet   Yes No   Sig: Take 5 mg by mouth daily at bedtime   escitalopram (LEXAPRO) 5 mg tablet   Yes No   Patient not taking: Reported on 7/23/2022   furosemide (LASIX) 20 mg tablet   No No   Sig: Take 1 tablet (20 mg total) by mouth daily   Patient taking differently: Take 40 mg by mouth daily   menthol-zinc oxide (Calmoseptine) 0 44-20 6 % OINT   Yes No   Sig: Apply topically 2 (two) times a day   metoprolol tartrate (LOPRESSOR) 25 mg tablet   Yes No   Sig: Take 25 mg by mouth 2 (two) times a day   montelukast (SINGULAIR) 10 mg tablet   Yes No   Sig: Take 10 mg by mouth every evening   mupirocin (BACTROBAN) 2 % ointment   Yes No   Sig: Apply topically 3 (three) times a day   potassium chloride (K-DUR,KLOR-CON) 10 mEq tablet   Yes No   Sig: Take 10 mEq by mouth 2 (two) times a day   rivaroxaban (XARELTO) 20 mg tablet   Yes No   Sig: Take 20 mg by mouth daily   senna (SENOKOT) 8 6 MG tablet   Yes No   Sig: Take 1 tablet by mouth daily   sertraline (ZOLOFT) 25 mg tablet   Yes No   Sig: Take 25 mg by mouth daily   timolol (TIMOPTIC-XE) 0 5 % ophthalmic gel-forming   Yes No   Patient not taking: Reported on 7/23/2022      Facility-Administered Medications: None       Past Medical History:   Diagnosis Date   • Alzheimer's dementia ([facility] [address] )    • Atrial fibrillation (HCC)    • Depression    • Hyperlipemia    • Hypertension        Past Surgical History:   Procedure Laterality Date   • BREAST SURGERY         Family History   Problem Relation Age of Onset   • Parkinsonism Brother    • Dementia Brother      I have reviewed and agree with the history as documented      E-Cigarette/Vaping     E-Cigarette/Vaping Substances     Social History     Tobacco Use   • Smoking status: Never   • Smokeless tobacco: Never   Substance Use Topics   • Alcohol use: No   • Drug use: No       Review of Systems   Unable to perform ROS: Dementia   Psychiatric/Behavioral: Positive for confusion  All other systems reviewed and are negative  Physical Exam  Physical Exam  Vitals reviewed  Constitutional:       General: She is not in acute distress  Appearance: She is well-developed and well-groomed  She is not diaphoretic  HENT:      Head: Normocephalic  Contusion present  Comments: Healing ecchymosis to the left forehead  Right Ear: External ear normal       Left Ear: External ear normal       Nose: Nose normal  No congestion or rhinorrhea  Mouth/Throat:      Mouth: Mucous membranes are moist       Pharynx: Oropharynx is clear  No oropharyngeal exudate or posterior oropharyngeal erythema  Eyes:      General: Lids are normal  No scleral icterus  Right eye: No discharge  Left eye: No discharge  Extraocular Movements: Extraocular movements intact  Conjunctiva/sclera: Conjunctivae normal       Pupils:      Right eye: Pupil is not reactive  Left eye: Pupil is round and reactive  Cardiovascular:      Rate and Rhythm: Normal rate and regular rhythm  Pulses: Normal pulses  Heart sounds: No murmur heard  No friction rub  No gallop  Pulmonary:      Effort: Pulmonary effort is normal  No respiratory distress  Breath sounds: Normal breath sounds  No wheezing, rhonchi or rales  Abdominal:      General: Abdomen is flat  There is no distension  Palpations: Abdomen is soft  Tenderness: There is no abdominal tenderness  There is no right CVA tenderness, left CVA tenderness, guarding or rebound  Musculoskeletal:         General: No deformity  Normal range of motion  Cervical back: Normal range of motion and neck supple  Skin:     General: Skin is warm and dry  Coloration: Skin is not jaundiced or pale  Findings: No rash  Neurological:      General: No focal deficit present  Mental Status: She is lethargic  GCS: GCS eye subscore is 2  GCS verbal subscore is 4  GCS motor subscore is 5  Cranial Nerves: No facial asymmetry  Comments: Unable to do neurologic exam as patient no cooperative  Responsive to pain      Psychiatric:         Mood and Affect: Mood normal          Behavior: Behavior normal          Vital Signs  ED Triage Vitals   Temperature Pulse Respirations Blood Pressure SpO2   11/30/22 0156 11/30/22 0015 11/30/22 0015 11/30/22 0015 11/30/22 0015   (!) 95 1 °F (35 1 °C) 76 19 146/63 95 %      Temp Source Heart Rate Source Patient Position - Orthostatic VS BP Location FiO2 (%)   11/30/22 0156 11/30/22 0015 11/30/22 0015 11/30/22 0015 --   Rectal Monitor Lying Right arm       Pain Score       11/30/22 0600       No Pain           Vitals:    11/30/22 0545 11/30/22 0600 11/30/22 0615 11/30/22 0728   BP: (!) 83/48 124/59 135/60 131/77   Pulse: 78 82 82 (!) 110   Patient Position - Orthostatic VS: Lying Lying Lying Lying         Visual Acuity  Visual Acuity    Flowsheet Row Most Recent Value   L Pupil Size (mm) 1   R Pupil Size (mm) 1          ED Medications  Medications   aspirin (ECOTRIN LOW STRENGTH) EC tablet 81 mg (has no administration in time range)   atorvastatin (LIPITOR) tablet 40 mg (has no administration in time range)   busPIRone (BUSPAR) tablet 5 mg (has no administration in time range)   donepezil (ARICEPT) tablet 5 mg (has no administration in time range)   metoprolol tartrate (LOPRESSOR) tablet 25 mg (has no administration in time range)   montelukast (SINGULAIR) tablet 10 mg (has no administration in time range)   rivaroxaban (XARELTO) tablet 10 mg (has no administration in time range)   sertraline (ZOLOFT) tablet 25 mg (has no administration in time range)   ceftriaxone (ROCEPHIN) 1 g/50 mL in dextrose IVPB (has no administration in time range)   doxycycline hyclate (VIBRAMYCIN) capsule 100 mg (has no administration in time range)   iohexol (OMNIPAQUE) 350 MG/ML injection (SINGLE-DOSE) 100 mL (100 mL Intravenous Given 11/30/22 0100)   ceftriaxone (ROCEPHIN) 2 g/50 mL in dextrose IVPB (0 mg Intravenous Stopped 11/30/22 0559)   furosemide (LASIX) injection 40 mg (40 mg Intravenous Given 11/30/22 0729)       Diagnostic Studies  Results Reviewed     Procedure Component Value Units Date/Time    CBC and differential [[id number]]  (Abnormal) Collected: 11/30/22 0724    Lab Status: Final result Specimen: Blood from Arm, Left Updated: 11/30/22 0735     WBC 4 93 Thousand/uL      RBC 2 97 Million/uL      Hemoglobin 9 8 g/dL      Hematocrit 31 7 %       fL      MCH 33 0 pg      MCHC 30 9 g/dL      RDW 15 1 %      MPV 9 2 fL      Platelets 448 Thousands/uL      nRBC 0 /100 WBCs      Neutrophils Relative 61 %      Immat GRANS % 1 %      Lymphocytes Relative 24 %      Monocytes Relative 10 %      Eosinophils Relative 3 %      Basophils Relative 1 %      Neutrophils Absolute 3 03 Thousands/µL      Immature Grans Absolute 0 04 Thousand/uL      Lymphocytes Absolute 1 19 Thousands/µL      Monocytes Absolute 0 47 Thousand/µL      Eosinophils Absolute 0 17 Thousand/µL      Basophils Absolute 0 03 Thousands/µL     Lactic Acid with Reflex STAT [[id number]] Collected: 11/30/22 0724    Lab Status: In process Specimen: Blood from Arm, Left Updated: 11/30/22 0732    Comprehensive metabolic panel [[id number]] Collected: 11/30/22 0724    Lab Status: In process Specimen: Blood from Arm, Left Updated: 11/30/22 0732    Magnesium [[id number]] Collected: 11/30/22 0724    Lab Status: In process Specimen: Blood from Arm, Left Updated: 11/30/22 0732    Protime-INR [[id number]] Collected: 11/30/22 0724    Lab Status: In process Specimen: Blood from Arm, Left Updated: 11/30/22 0732    APTT [[id number]] Collected: 11/30/22 0724    Lab Status: In process Specimen: Blood from Arm, Left Updated: 11/30/22 0732    Procalcitonin [[id number]] Collected: 11/30/22 0724    Lab Status:  In process Specimen: Blood from Arm, Left Updated: 11/30/22 0732    High Sensitivity Troponin I Random [[id number]]  (Normal) Collected: 11/30/22 7238    Lab Status: Final result Specimen: Blood from Arm, Left Updated: 11/30/22 0704     HS TnI random 9 ng/L     NT-BNP PRO [[id number]] Collected: 11/30/22 0985    Lab Status: In process Specimen: Blood from Arm, Left Updated: 11/30/22 0628    Procalcitonin [[id number]]  (Normal) Collected: 11/30/22 0428    Lab Status: Final result Specimen: Blood from Arm, Left Updated: 11/30/22 0516     Procalcitonin 0 07 ng/ml     Protime-INR [[id number]]  (Abnormal) Collected: 11/30/22 0428    Lab Status: Final result Specimen: Blood from Arm, Left Updated: 11/30/22 0513     Protime 20 1 seconds      INR 1 72    APTT [[id number]]  (Normal) Collected: 11/30/22 0428    Lab Status: Final result Specimen: Blood from Arm, Left Updated: 11/30/22 0513     PTT 30 seconds     Lactic acid [[id number]]  (Normal) Collected: 11/30/22 0428    Lab Status: Final result Specimen: Blood from Arm, Left Updated: 11/30/22 0512     LACTIC ACID 0 7 mmol/L     Narrative:      Result may be elevated if tourniquet was used during collection  Blood gas, venous [[id number]]  (Abnormal) Collected: 11/30/22 0439    Lab Status: Final result Specimen: Blood from Arm, Left Updated: 11/30/22 0454     pH, Dio 7 293     pCO2, Dio 71 8 mm Hg      pO2, Dio 53 1 mm Hg      HCO3, Dio 34 0 mmol/L      Base Excess, Dio 5 7 mmol/L      O2 Content, Dio 12 4 ml/dL      O2 HGB, VENOUS 82 5 %     Blood culture #2 [[id number]] Collected: 11/30/22 0024    Lab Status: In process Specimen: Blood from Arm, Right Updated: 11/30/22 0446    Blood culture #1 [[id number]] Collected: 11/30/22 0024    Lab Status:  In process Specimen: Blood from Arm, Left Updated: 11/30/22 0446    UA w Reflex to Microscopic w Reflex to Culture [[id number]]     Lab Status: No result Specimen: Urine     Valproic acid level, total [[id number]]  (Abnormal) Collected: 11/30/22 0030    Lab Status: Final result Specimen: Blood from Arm, Left Updated: 11/30/22 0152     Valproic Acid, Total <3 ug/mL Comprehensive metabolic panel [[id number]]  (Abnormal) Collected: 11/30/22 0030    Lab Status: Final result Specimen: Blood from Arm, Left Updated: 11/30/22 0111     Sodium 147 mmol/L      Potassium 4 5 mmol/L      Chloride 108 mmol/L      CO2 37 mmol/L      ANION GAP 2 mmol/L      BUN 21 mg/dL      Creatinine 1 30 mg/dL      Glucose 103 mg/dL      Calcium 9 4 mg/dL      Corrected Calcium 10 5 mg/dL      AST 16 U/L      ALT 11 U/L      Alkaline Phosphatase 111 U/L      Total Protein 6 7 g/dL      Albumin 2 6 g/dL      Total Bilirubin 0 30 mg/dL      eGFR 38 ml/min/1 73sq m     Narrative:      National [employer] guidelines for Chronic Kidney Disease (CKD):   •  Stage 1 with normal or high GFR (GFR > 90 mL/min/1 73 square meters)  •  Stage 2 Mild CKD (GFR = 60-89 mL/min/1 73 square meters)  •  Stage 3A Moderate CKD (GFR = 45-59 mL/min/1 73 square meters)  •  Stage 3B Moderate CKD (GFR = 30-44 mL/min/1 73 square meters)  •  Stage 4 Severe CKD (GFR = 15-29 mL/min/1 73 square meters)  •  Stage 5 End Stage CKD (GFR <15 mL/min/1 73 square meters)  Note: GFR calculation is accurate only with a steady state creatinine    CK (with reflex to MB) [[id number]]  (Abnormal) Collected: 11/30/22 0030    Lab Status: Final result Specimen: Blood from Arm, Left Updated: 11/30/22 0111     Total CK 13 U/L     CBC and differential [[id number]]  (Abnormal) Collected: 11/30/22 0030    Lab Status: Final result Specimen: Blood from Arm, Left Updated: 11/30/22 0049     WBC 4 64 Thousand/uL      RBC 3 23 Million/uL      Hemoglobin 10 6 g/dL      Hematocrit 34 4 %       fL      MCH 32 8 pg      MCHC 30 8 g/dL      RDW 15 1 %      MPV 9 1 fL      Platelets 716 Thousands/uL      nRBC 0 /100 WBCs      Neutrophils Relative 56 %      Immat GRANS % 1 %      Lymphocytes Relative 28 %      Monocytes Relative 10 %      Eosinophils Relative 4 %      Basophils Relative 1 %      Neutrophils Absolute 2 59 Thousands/µL      Immature Grans Absolute 0 05 Thousand/uL      Lymphocytes Absolute 1 29 Thousands/µL      Monocytes Absolute 0 48 Thousand/µL      Eosinophils Absolute 0 18 Thousand/µL      Basophils Absolute 0 05 Thousands/µL                  CT head without contrast   Final Result by Eugenio Spivey MD (11/30 0128)      No acute intracranial abnormality  Workstation performed: [id number]         CT cervical spine without contrast   Final Result by Eugenio Spivey MD (11/30 0157)      No acute cervical spine fracture or traumatic malalignment  Workstation performed: [id number]         CT chest abdomen pelvis w contrast   Final Result by Theodore Ching MD (11/30 0151)      Large bilateral effusions     Bilateral basilar compressive atelectatic changes  Superimposed infection must be excluded clinically    10 mm nodule in the right upper lobe  I suspect this is a focus of pulmonary edema  Recommend imaging follow-up to    resolution  The study was marked in [facility] for immediate notification         Workstation performed: [id number]                    Procedures  ECG 12 Lead Documentation Only    Date/Time: 11/30/2022 1:08 AM  Performed by: Calin Law PA-C  Authorized by: Calin Law PA-C     Indications / Diagnosis:  AMS  ECG reviewed by me, the ED Provider: yes (Also reviewed by Dr Maya Mabry)    Patient location:  ED  Previous ECG:     Previous ECG:  Compared to current    Similarity:  Changes noted  Interpretation:     Interpretation: normal    Rate:     ECG rate:  81    ECG rate assessment: normal    Rhythm:     Rhythm: atrial fibrillation    Ectopy:     Ectopy: none    QRS:     QRS axis:  Normal    QRS intervals:  Normal  Conduction:     Conduction: normal    ST segments:     ST segments:  Normal  T waves:     T waves: non-specific               ED Course  ED Course as of 11/30/22 0740   Wed Nov 30, 2022   R [address] at [facility]: states this evening patient was not responding to staff, states not like her, usually talkative and active at night but not tonight  No new medication or recent changes  0141 CT head without contrast  No acute intracranial abnormality  0158 CT chest abdomen pelvis w contrast  Large bilateral effusions     Bilateral basilar compressive atelectatic changes  Superimposed infection must be excluded clinically    10 mm nodule in the right upper lobe  I suspect this is a focus of pulmonary edema  Recommend imaging follow-up to   resolution      The study was marked in EPIC for immediate notification  0208 CT cervical spine without contrast  No acute cervical spine fracture or traumatic malalignment  Initial Sepsis Screening     Row Name 11/30/22 0400                Is the patient's history suggestive of a new or worsening infection? Yes (Proceed)  -CM        Suspected source of infection pneumonia  -CM        Are two or more of the following signs & symptoms of infection both present and new to the patient? Yes (Proceed)  -CM        Indicate SIRS criteria Hypothermia < 36C (96 8F)  AMS  -CM        If the answer is yes to both questions, suspicion of sepsis is present --        If severe sepsis is present AND tissue hypoperfusion perists in the hour after fluid resuscitation or lactate > 4, the patient meets criteria for SEPTIC SHOCK --        Are any of the following organ dysfunction criteria present within 6 hours of suspected infection and SIRS criteria that are NOT considered to be chronic conditions?  No  -CM        Organ dysfunction --        Date of presentation of severe sepsis --        Time of presentation of severe sepsis --        Tissue hypoperfusion persists in the hour after crystalloid fluid administration, evidenced, by either: --        Was hypotension present within one hour of the conclusion of crystalloid fluid administration? --        Date of presentation of septic shock --        Time of presentation of septic shock --              User Key  (r) = Recorded By, (t) = Taken By, (c) = Cosigned By    [address] Name Provider Type    NOEMY Jaime PA-C Physician Assistant                SBIRT 22yo+    Flowsheet Row Most Recent Value   SBIRT (23 yo +)    In order to provide better care to our patients, we are screening all of our patients for alcohol and drug use  Would it be okay to ask you these screening questions? Unable to answer at this time Filed at: 11/30/2022 0047                    MDM  Number of Diagnoses or Management Options  Altered mental status: new and requires workup  Hypoxia: new and requires workup  Pneumonia: new and requires workup  Sepsis [facility]): new and requires workup  Diagnosis management comments:     Patient presenting for altered mental status from skilled nursing facility  Patient is responsive to pain, when being stuck by nurses for IV, patient awakes and says “no”  Discussed with hermila Pa from patient's nursing facility who states the patient was not as arousable and would not answer questions this evening which is “unlike the patient”  Will order CBC for evaluation of anemia infection, CMP for evaluation of LFTs, kidney function and electrolyte abnormalities  Will order Depakote level  Will order a CT head for bruising, possible intracranial bleed  Will order CT cervical spine for evaluation of neck trauma  Will order CT chest abdomen pelvis with contrast     Reassessed patient, patient was verbally responsive  She was oriented to person and place, believed year was 36  Patient O2 dropped to high 80s, will place on NC 2L  CT C/A/P:Large bilateral effusions     Bilateral basilar compressive atelectatic changes  Superimposed infection must be excluded clinically  10 mm nodule in the right upper lobe  I suspect this is a focus of pulmonary edema  Recommend imaging follow-up to resolution  Will add on sepsis workup as patient hypothermic at 95 1  Will start IV Rocephin   Will admit to [facility] for new O2 requirement, possible pneumonia  Patient stable at time of admission  Amount and/or Complexity of Data Reviewed  Clinical lab tests: ordered and reviewed  Tests in the radiology section of CPT®: ordered and reviewed  Tests in the medicine section of CPT®: ordered and reviewed  Obtain history from someone other than the patient: yes  Review and summarize past medical records: yes  Independent visualization of images, tracings, or specimens: yes        Disposition  Final diagnoses: Altered mental status   Hypoxia   Pneumonia   Sepsis ([facility] [address] )     Time reflects when diagnosis was documented in both MDM as applicable and the Disposition within this note     Time User Action Codes Description Comment    11/30/2022  4:01 AM Rhoderick Rather Add [R41 82] Altered mental status     11/30/2022  4:01 AM Rhoderick Rather Add [R09 02] Hypoxia     11/30/2022  4:02 AM Rhoderick Rather Add [J18 9] Pneumonia     11/30/2022  4:02 AM Rhoderick Rather Add [A41 9] Sepsis ([facility] [address] )     11/30/2022  5:24 AM Wandalee Perkins Add [J96 92] Hypercapnic respiratory failure [facility])       ED Disposition     ED Disposition   Admit    Condition   Stable    Date/Time   Wed Nov 30, 2022  4:37 AM    Comment   Case was discussed with VICKI and the patient's admission status was agreed to be Admission Status: observation status to the service of Dr Gail Cantrell             Follow-up Information    None         Current Discharge Medication List      CONTINUE these medications which have NOT CHANGED    Details   aspirin (ECOTRIN LOW STRENGTH) 81 mg EC tablet Take 1 tablet (81 mg total) by mouth daily  Qty: 30 tablet, Refills: 11    Associated Diagnoses: Acute embolic stroke (HCC)      atorvastatin (LIPITOR) 40 mg tablet Take 1 tablet (40 mg total) by mouth every evening  Qty: 30 tablet, Refills: 11    Associated Diagnoses: Acute embolic stroke (HCC)      busPIRone (BUSPAR) 5 mg tablet Take 5 mg by mouth 3 (three) times a day      clindamycin (CLEOCIN) 300 MG capsule Take 300 mg by mouth 3 (three) times a day      donepezil (ARICEPT) 5 mg tablet Take 5 mg by mouth daily at bedtime      escitalopram (LEXAPRO) 5 mg tablet       furosemide (LASIX) 20 mg tablet Take 1 tablet (20 mg total) by mouth daily  Qty: 90 tablet, Refills: 3    Associated Diagnoses: Lower extremity edema      Glucosamine Sulfate 500 MG TABS Take by mouth Daily      menthol-zinc oxide (Calmoseptine) 0 44-20 6 % OINT Apply topically 2 (two) times a day      metoprolol tartrate (LOPRESSOR) 25 mg tablet Take 25 mg by mouth 2 (two) times a day      montelukast (SINGULAIR) 10 mg tablet Take 10 mg by mouth every evening  Refills: 0      mupirocin (BACTROBAN) 2 % ointment Apply topically 3 (three) times a day      OLANZapine (ZyPREXA) 2 5 mg tablet Take 2 5 mg by mouth daily at bedtime      potassium chloride (K-DUR,KLOR-CON) 10 mEq tablet Take 10 mEq by mouth 2 (two) times a day      RESVERATROL PO Take 1,000 mg by mouth daily      rivaroxaban (XARELTO) 20 mg tablet Take 20 mg by mouth daily      senna (SENOKOT) 8 6 MG tablet Take 1 tablet by mouth daily      sertraline (ZOLOFT) 25 mg tablet Take 25 mg by mouth daily      Tetrahydrozoline HCl (EYE DROPS OP) Apply to eye      timolol (TIMOPTIC-XE) 0 5 % ophthalmic gel-forming              No discharge procedures on file      PDMP Review     None          ED Provider  Electron[facility] Signed [facility]  11/30/22 845 [facility] IVELISSE Falcon  11/30/22 2608

## 2022-11-30 NOTE — Clinical Note
Case was discussed with VICKI and the patient's admission status was agreed to be Admission Status: inpatient status to the service of Dr Yaya Jones

## 2022-11-30 NOTE — ASSESSMENT & PLAN NOTE
-history of pulmonary hypertension; noted on echocardiogram 2018  -on PTA Lasix 40 mg oral daily  Plan  > as above in respiratory failure section

## 2022-12-01 LAB
ANION GAP SERPL CALCULATED.3IONS-SCNC: 1 MMOL/L (ref 4–13)
BASOPHILS # BLD AUTO: 0.04 THOUSANDS/ÂΜL (ref 0–0.1)
BASOPHILS NFR BLD AUTO: 1 % (ref 0–1)
BUN SERPL-MCNC: 19 MG/DL (ref 5–25)
CALCIUM SERPL-MCNC: 8.8 MG/DL (ref 8.3–10.1)
CHLORIDE SERPL-SCNC: 107 MMOL/L (ref 96–108)
CO2 SERPL-SCNC: 40 MMOL/L (ref 21–32)
CREAT SERPL-MCNC: 1.34 MG/DL (ref 0.6–1.3)
EOSINOPHIL # BLD AUTO: 0.05 THOUSAND/ÂΜL (ref 0–0.61)
EOSINOPHIL NFR BLD AUTO: 1 % (ref 0–6)
ERYTHROCYTE [DISTWIDTH] IN BLOOD BY AUTOMATED COUNT: 14.8 % (ref 11.6–15.1)
GFR SERPL CREATININE-BSD FRML MDRD: 36 ML/MIN/1.73SQ M
GLUCOSE SERPL-MCNC: 84 MG/DL (ref 65–140)
GLUCOSE SERPL-MCNC: 87 MG/DL (ref 65–140)
GLUCOSE SERPL-MCNC: 89 MG/DL (ref 65–140)
GLUCOSE SERPL-MCNC: 96 MG/DL (ref 65–140)
HCT VFR BLD AUTO: 34.2 % (ref 34.8–46.1)
HGB BLD-MCNC: 10.4 G/DL (ref 11.5–15.4)
IMM GRANULOCYTES # BLD AUTO: 0.04 THOUSAND/UL (ref 0–0.2)
IMM GRANULOCYTES NFR BLD AUTO: 1 % (ref 0–2)
LYMPHOCYTES # BLD AUTO: 1.2 THOUSANDS/ÂΜL (ref 0.6–4.47)
LYMPHOCYTES NFR BLD AUTO: 23 % (ref 14–44)
MCH RBC QN AUTO: 32.4 PG (ref 26.8–34.3)
MCHC RBC AUTO-ENTMCNC: 30.4 G/DL (ref 31.4–37.4)
MCV RBC AUTO: 107 FL (ref 82–98)
MONOCYTES # BLD AUTO: 0.38 THOUSAND/ÂΜL (ref 0.17–1.22)
MONOCYTES NFR BLD AUTO: 7 % (ref 4–12)
NEUTROPHILS # BLD AUTO: 3.42 THOUSANDS/ÂΜL (ref 1.85–7.62)
NEUTS SEG NFR BLD AUTO: 67 % (ref 43–75)
NRBC BLD AUTO-RTO: 0 /100 WBCS
PLATELET # BLD AUTO: 180 THOUSANDS/UL (ref 149–390)
PMV BLD AUTO: 8.8 FL (ref 8.9–12.7)
POTASSIUM SERPL-SCNC: 3.6 MMOL/L (ref 3.5–5.3)
PROCALCITONIN SERPL-MCNC: 0.07 NG/ML
RBC # BLD AUTO: 3.21 MILLION/UL (ref 3.81–5.12)
SODIUM SERPL-SCNC: 148 MMOL/L (ref 135–147)
WBC # BLD AUTO: 5.13 THOUSAND/UL (ref 4.31–10.16)

## 2022-12-01 RX ORDER — ECHINACEA PURPUREA EXTRACT 125 MG
1 TABLET ORAL
Status: DISCONTINUED | OUTPATIENT
Start: 2022-12-01 | End: 2022-12-02 | Stop reason: HOSPADM

## 2022-12-01 RX ORDER — HALOPERIDOL 5 MG/ML
0.5 INJECTION INTRAMUSCULAR EVERY 2 HOUR PRN
Status: DISCONTINUED | OUTPATIENT
Start: 2022-12-01 | End: 2022-12-02 | Stop reason: HOSPADM

## 2022-12-01 RX ORDER — HYDROMORPHONE HCL/PF 1 MG/ML
0.3 SYRINGE (ML) INJECTION EVERY 2 HOUR PRN
Status: DISCONTINUED | OUTPATIENT
Start: 2022-12-01 | End: 2022-12-02 | Stop reason: HOSPADM

## 2022-12-01 RX ORDER — LORAZEPAM 2 MG/ML
1 INJECTION INTRAMUSCULAR
Status: DISCONTINUED | OUTPATIENT
Start: 2022-12-01 | End: 2022-12-02 | Stop reason: HOSPADM

## 2022-12-01 RX ORDER — GLYCOPYRROLATE 0.2 MG/ML
0.1 INJECTION INTRAMUSCULAR; INTRAVENOUS EVERY 4 HOURS PRN
Status: DISCONTINUED | OUTPATIENT
Start: 2022-12-01 | End: 2022-12-02 | Stop reason: HOSPADM

## 2022-12-01 RX ADMIN — METOPROLOL TARTRATE 25 MG: 25 TABLET, FILM COATED ORAL at 09:34

## 2022-12-01 RX ADMIN — FUROSEMIDE 40 MG: 10 INJECTION, SOLUTION INTRAMUSCULAR; INTRAVENOUS at 09:34

## 2022-12-01 RX ADMIN — BUSPIRONE HYDROCHLORIDE 5 MG: 5 TABLET ORAL at 09:34

## 2022-12-01 RX ADMIN — MONTELUKAST 10 MG: 10 TABLET, FILM COATED ORAL at 18:31

## 2022-12-01 RX ADMIN — ASPIRIN 81 MG: 81 TABLET, COATED ORAL at 09:34

## 2022-12-01 RX ADMIN — SERTRALINE HYDROCHLORIDE 25 MG: 25 TABLET ORAL at 09:34

## 2022-12-01 RX ADMIN — SALINE NASAL SPRAY 1 SPRAY: 1.5 SOLUTION NASAL at 17:53

## 2022-12-01 NOTE — PLAN OF CARE
Problem: PHYSICAL THERAPY ADULT  Goal: Performs mobility at highest level of function for planned discharge setting  See evaluation for individualized goals  Description: Treatment/Interventions: Functional transfer training, LE strengthening/ROM, Therapeutic exercise, Endurance training, Patient/family training, Bed mobility, Gait training, Spoke to nursing, OT, Family          See flowsheet documentation for full assessment, interventions and recommendations  Note: Prognosis: Guarded  Problem List: Decreased strength, Decreased endurance, Impaired balance, Decreased mobility, Decreased cognition, Impaired judgement, Decreased safety awareness  Assessment: Pt  79 y  o female presented from retirement w/ inc lethargy & altered mental status  Pt admitted for Encephalopathy acute w/ acute hypercapnic respiratory failure & large B/L pleural effusion  Pt referred to PT for mobility assessment & D/C planning  Please see above for information re: home set-up & PLOF as well as objective findings during PT assessment  On eval, pt functioning below baseline hence will continue skilled PT to improve function & safety  Pt require maxAx2 for bed mobility & transfers + cues for techniques & safety  Pt unable to tolerate amb at this time 2* to inc lethargy  Pt w/ inc difficulty keeping herself awake, would only open her eyes briefly, pt able to mouth words at times & require frequent tactile & verbal stimulation to keep pt engaged  The patient's AM-PAC Basic Mobility Inpatient Short Form Raw Score is 6  A Raw score of less than or equal to 16 suggests the patient may benefit from discharge to post-acute rehabilitation services  Please also refer to the recommendation of the Physical Therapist for safe discharge planning  From PT standpoint, given pt's dementia, it will be more beneficial for pt to return to [facility] w/ rehab services  Pt may need STR if retirement unable to accept pt at her current level of function   No SOB & dizziness reported t/o session  Pt on 2L O2 NC t/o session w/ SpO2 at 97%  Nsg staff most recent vital signs as follows: /59   Pulse 93   Temp 98 °F (36 7 °C)   Resp 19   Ht 4' 10" (1 473 m)   Wt 69 9 kg (154 lb)   LMP  (LMP Unknown)   SpO2 99%   BMI 32 19 kg/m²   At end of session, pt back in bed in stable condition, call bell & phone in reach, bed alarm activated, all lines intact  CM to follow  Nsg staff to continue to mobilized pt as tolerated to prevent further decline in function  Nsg notified  Co-eval was necessary to complete this PT eval for the pt's best interest given pt's medical acuity & complexity  PT Discharge Recommendation: Return to facility with rehabilitation services    See flowsheet documentation for full assessment

## 2022-12-01 NOTE — PHYSICAL THERAPY NOTE
PT EVALUATION    Pt  Name: Carl Chung  Pt  Age: 79 y o  MRN: [id number]  LENGTH OF STAY: 1      Admitting Diagnoses: Altered mental status [R41 82]  Pneumonia [J18 9]  Hypoxia [R09 02]  Sepsis ([address] ) [A41 9]  Known medical problems [Z78 9]  Hypercapnic respiratory failure ([address] ) [J96 92]    Past Medical History:   Diagnosis Date    Alzheimer's dementia ([address] )     Atrial fibrillation ([address] )     Depression     Hyperlipemia     Hypertension        Past Surgical History:   Procedure Laterality Date    BREAST SURGERY         Imaging Studies:  CT head without contrast   Final Result by Celio Mccarthy MD (11/30 0128)      No acute intracranial abnormality  Workstation performed: [id number]         CT cervical spine without contrast   Final Result by Celio Mccarthy MD (11/30 0157)      No acute cervical spine fracture or traumatic malalignment  Workstation performed: [id number]         CT chest abdomen pelvis w contrast   Final Result by Brody Herrera MD (11/30 0151)      Large bilateral effusions     Bilateral basilar compressive atelectatic changes  Superimposed infection must be excluded clinically    10 mm nodule in the right upper lobe  I suspect this is a focus of pulmonary edema  Recommend imaging follow-up to    resolution  The study was marked in [facility] for immediate notification         Workstation performed: [id number]               12/01/22 1019   PT Last Visit   PT Visit Date 12/01/22   Note Type   Note type Evaluation   Pain Assessment   Pain Assessment Tool FLACC   Pain Rating: FLACC (Rest) - Face 0   Pain Rating: FLACC (Rest) - Legs 0   Pain Rating: FLACC (Rest) - Activity 0   Pain Rating: FLACC (Rest) - Cry 0   Pain Rating: FLACC (Rest) - Consolability 0   Score: FLACC (Rest) 0   Pain Rating: FLACC (Activity) - Face 0   Pain Rating: FLACC (Activity) - Legs 0   Pain Rating: FLACC (Activity) - Activity 0   Pain Rating: FLACC (Activity) - Cry 0   Pain Rating: FLACC (Activity) - Consolability 0   Score: FLACC (Activity) 0   Restrictions/Precautions   Weight Bearing Precautions Per Order No   Other Precautions Cognitive; Chair Alarm; Bed Alarm;Multiple lines;Telemetry;O2;Fall Risk  (2L O2 NC)   Home Living   Type of Home Assisted living  ([facility])   Additional Comments above information gathered from chart & per family   Prior Function   Level of [place] Independent with functional mobility; Needs assistance with ADLs  (w/o AD)   Lives With Facility staff   Receives Help From [facility] in the last 6 months 1 to 4  (1x, per family)   Vocational Retired   Comments Per daughters, pt was ambulating on her own w/o AD at facility but requires assistance w/ her ADL's which the facility provides  General   Additional Pertinent History Alzheimer's dementia   Family/Caregiver Present Yes  (daughters)   Cognition   Overall Cognitive Status Unable to assess   Arousal/Participation Lethargic   Orientation Level Unable to assess   Following Commands Follows one step commands inconsistently   Comments pt lethargic; briefly opens eyes w/ verbal stimulation   Subjective   Subjective None stated  RN cleared pt for PT/OT evals  RUE Assessment   RUE Assessment   (refer to OT)   LUE Assessment   LUE Assessment   (refer to OT)   RLE Assessment   RLE Assessment X  (WFL PROM; unable to formally test MMT as pt unable to follow commands)   LLE Assessment   LLE Assessment X  (WFL PROM; unable to formally test MMT as pt unable to follow commands)   Bed Mobility   Rolling R 2  Maximal assistance   Additional items Assist x 2;HOB elevated; Increased time required;Verbal cues;LE management   Supine to Sit 2  Maximal assistance   Additional items Assist x 2; Increased time required;Verbal cues;LE management;HOB elevated   Sit to Supine 2  Maximal assistance   Additional items Assist x 2; Increased time required;Verbal cues;LE management   Additional Comments cues for techniques & safety   Transfers   Sit to Stand 2  Maximal assistance   Additional items Assist x 2; Increased time required;Verbal cues   Stand to Sit 2  Maximal assistance   Additional items Assist x 2; Increased time required;Verbal cues   Additional Comments cues for techniques & safety   Ambulation/Elevation   Gait pattern Not appropriate   Ambulation/Elevation Additional Comments pt unable to tolerate at this time 2* to inc lethargy   Balance   Static Sitting Poor +   Dynamic Sitting Poor   Static Standing Poor -   Dynamic Standing   (zero)   Ambulatory Zero   Activity Tolerance   Activity Tolerance Treatment limited secondary to medical complications (Comment)   Medical Staff Made Aware OTR Murali   Nurse Made Aware RN SB   Assessment   Prognosis Guarded   Problem List Decreased strength;Decreased endurance; Impaired balance;Decreased mobility; Decreased cognition; Impaired judgement;Decreased safety awareness   Assessment Pt  82 y  o female presented from [facility] w/ inc lethargy & altered mental status  Pt admitted for Encephalopathy acute w/ acute hypercapnic respiratory failure & large B/L pleural effusion  Pt referred to PT for mobility assessment & D/C planning  Please see above for information re: home set-up & PLOF as well as objective findings during PT assessment  On eval, pt functioning below baseline hence will continue skilled PT to improve function & safety  Pt require maxAx2 for bed mobility & transfers + cues for techniques & safety  Pt unable to tolerate amb at this time 2* to inc lethargy  Pt w/ inc difficulty keeping herself awake, would only open her eyes briefly, pt able to mouth words at times & require frequent tactile & verbal stimulation to keep pt engaged  The patient's AM-PAC Basic Mobility Inpatient Short Form Raw Score is 6  A Raw score of less than or equal to 16 suggests the patient may benefit from discharge to post-acute rehabilitation services   Please also refer to the recommendation of the Physical Therapist for safe discharge planning  From PT standpoint, given pt's dementia, it will be more beneficial for pt to return to [facility] w/ rehab services  Pt may need STR if [facility] unable to accept pt at her current level of function  No SOB & dizziness reported t/o session  Pt on 2L O2 NC t/o session w/ SpO2 at 97%  Nsg staff most recent vital signs as follows: /59   Pulse 93   Temp 98 °F (36 7 °C)   Resp 19   Ht 4' 10" (1 473 m)   Wt 69 9 kg (154 lb)   LMP  (LMP Unknown)   SpO2 99%   BMI 32 19 kg/m²   At end of session, pt back in bed in stable condition, call bell & phone in reach, bed alarm activated, all lines intact  CM to follow  Nsg staff to continue to mobilized pt as tolerated to prevent further decline in function  [facility] notified  Co-eval was necessary to complete this PT eval for the pt's best interest given pt's medical acuity & complexity  Goals   Patient Goals daughters goals is for pt to return to [facility]   STG Expiration Date 12/15/22   Short Term Goal #1 Goals to be met in 14 days; pt will be able to: 1) inc strength & balance by 1/2 grade to improve overall functional mobility & dec fall risk; 2) inc bed mobility to minAx1 for pt to be able to get in/OOB safely w/ proper techniques 100% of the time, to dec caregiver burden & safely function at home; 3) inc transfers to minAx1 for pt to transition safely from one surface to another w/o % of the time, to dec caregiver burden & safely function at home; 4) continue pre-gait training & progress to amb w/ RW approx  >50' w/ minAx1 as appropriate; 5) pt/caregiver ed   PT Treatment Day 0   Plan   Treatment/Interventions Functional transfer training;LE strengthening/ROM; Therapeutic exercise; Endurance training;Patient/family training;Bed mobility;Gait training;Spoke to nursing;OT;Family   PT Frequency 3-5x/wk   Recommendation   PT Discharge Recommendation Return to facility with rehabilitation services   Kita Thompson 003 Turning in Bed Without Bedrails 1   Lying on Back to Sitting on Edge of Flat Bed 1   Moving Bed to Chair 1   Standing Up From Chair 1   Walk in Room 1   Climb 3-5 Stairs 1   Basic Mobility Inpatient Raw Score 6   Turning Head Towards Sound 1   Follow Simple Instructions 1   Low Function Basic Mobility Raw Score 8   Low Function Basic Mobility Standardized Score 10 37   Highest Level Of Mobility   -[facility] Goal 2: Bed activities/Dependent transfer   -HL Achieved 3: Sit at edge of bed   End of Consult   Patient Position at End of Consult Supine;Bed/Chair alarm activated; All needs within reach   End of Consult Comments Pt in stable condition  All needs in reach  Bed alarm activated  All line intact     Hx/personal factors: co-morbidities, advanced age, mutliple lines, telemetry, use of AD, dec cognition, h/o of falls, fall risk, assist w/ ADL's, and O2  Examination: dec mobility, dec balance, dec endurance, dec amb, risk for falls, dec cognition  Clinical: unpredictable (ongoing medical status, abnormal lab values, and risk for falls)  Complexity: high    [employer]

## 2022-12-01 NOTE — PLAN OF CARE
Problem: Potential for Falls  Goal: Patient will remain free of falls  Description: INTERVENTIONS:  - Educate patient/family on patient safety including physical limitations  - Instruct patient to call for assistance with activity   - Consult OT/PT to assist with strengthening/mobility   - Keep Call bell within reach  - Keep bed low and locked with side rails adjusted as appropriate  - Keep care items and personal belongings within reach  - Initiate and maintain comfort rounds  - Make Fall Risk Sign visible to staff  - Initiate/Maintain bed alarm  - Apply yellow socks and bracelet for high fall risk patients  - Consider moving patient to room near nurses station  Outcome: Progressing     Problem: MOBILITY - ADULT  Goal: Maintain or return to baseline ADL function  Description: INTERVENTIONS:  -  Assess patient's ability to carry out ADLs; assess patient's baseline for ADL function and identify physical deficits which impact ability to perform ADLs (bathing, care of mouth/teeth, toileting, grooming, dressing, etc )  - Assess/evaluate cause of self-care deficits   - Assess range of motion  - Assess patient's mobility; develop plan if impaired  - Assess patient's need for assistive devices and provide as appropriate  - Encourage maximum independence but intervene and supervise when necessary  - Involve family in performance of ADLs  - Assess for home care needs following discharge   - Consider OT consult to assist with ADL evaluation and planning for discharge  - Provide patient education as appropriate  Outcome: Progressing  Goal: Maintains/Returns to pre admission functional level  Description: INTERVENTIONS:  - Perform BMAT or MOVE assessment daily    - Set and communicate daily mobility goal to care team and patient/family/caregiver  - Collaborate with rehabilitation services on mobility goals if consulted  - Reposition patient every 2 hours    - Out of bed for toileting  - Record patient progress and toleration of activity level   Outcome: Progressing     Problem: PAIN - ADULT  Goal: Verbalizes/displays adequate comfort level or baseline comfort level  Description: Interventions:  - Encourage patient to monitor pain and request assistance  - Assess pain using appropriate pain scale  - Administer analgesics based on type and severity of pain and evaluate response  - Implement non-pharmacological measures as appropriate and evaluate response  - Consider cultural and social influences on pain and pain management  - Notify physician/advanced practitioner if interventions unsuccessful or patient reports new pain  Outcome: Progressing     Problem: INFECTION - ADULT  Goal: Absence or prevention of progression during hospitalization  Description: INTERVENTIONS:  - Assess and monitor for signs and symptoms of infection  - Monitor lab/diagnostic results  - Monitor all insertion sites, i e  indwelling lines, tubes, and drains  - Monitor endotracheal if appropriate and nasal secretions for changes in amount and color  - [place] appropriate cooling/warming therapies per order  - Administer medications as ordered  - Instruct and encourage patient and family to use good hand hygiene technique  - Identify and instruct in appropriate isolation precautions for identified infection/condition  Outcome: Progressing  Goal: Absence of fever/infection during neutropenic period  Description: INTERVENTIONS:  - Monitor WBC    Outcome: Progressing     Problem: SAFETY ADULT  Goal: Patient will remain free of falls  Description: INTERVENTIONS:  - Educate patient/family on patient safety including physical limitations  - Instruct patient to call for assistance with activity   - Consult OT/PT to assist with strengthening/mobility   - Keep Call bell within reach  - Keep bed low and locked with side rails adjusted as appropriate  - Keep care items and personal belongings within reach  - Initiate and maintain comfort rounds  - Make Fall Risk Sign visible to staff  - Initiate/Maintain bed alarm  - Apply yellow socks and bracelet for high fall risk patients  - Consider moving patient to room near nurses station  Outcome: Progressing  Goal: Maintain or return to baseline ADL function  Description: INTERVENTIONS:  -  Assess patient's ability to carry out ADLs; assess patient's baseline for ADL function and identify physical deficits which impact ability to perform ADLs (bathing, care of mouth/teeth, toileting, grooming, dressing, etc )  - Assess/evaluate cause of self-care deficits   - Assess range of motion  - Assess patient's mobility; develop plan if impaired  - Assess patient's need for assistive devices and provide as appropriate  - Encourage maximum independence but intervene and supervise when necessary  - Involve family in performance of ADLs  - Assess for home care needs following discharge   - Consider OT consult to assist with ADL evaluation and planning for discharge  - Provide patient education as appropriate  Outcome: Progressing  Goal: Maintains/Returns to pre admission functional level  Description: INTERVENTIONS:  - Perform BMAT or MOVE assessment daily    - Set and communicate daily mobility goal to care team and patient/family/caregiver  - Collaborate with rehabilitation services on mobility goals if consulted  - Reposition patient every 2 hours    - Out of bed for toileting  - Record patient progress and toleration of activity level   Outcome: Progressing     Problem: DISCHARGE PLANNING  Goal: Discharge to home or other facility with appropriate resources  Description: INTERVENTIONS:  - Identify barriers to discharge w/patient and caregiver  - Arrange for needed discharge resources and transportation as appropriate  - Identify discharge learning needs (meds, wound care, etc )  - Arrange for interpretive services to assist at discharge as needed  - Refer to Case Management Department for coordinating discharge planning if the patient needs post-hospital services based on physician/advanced practitioner order or complex needs related to functional status, cognitive ability, or social support system  Outcome: Progressing     Problem: Knowledge Deficit  Goal: Patient/family/caregiver demonstrates understanding of disease process, treatment plan, medications, and discharge instructions  Description: Complete learning assessment and assess knowledge base    Interventions:  - Provide teaching at level of understanding  - Provide teaching via preferred learning methods  Outcome: Progressing     Problem: Prexisting or High Potential for Compromised Skin Integrity  Goal: Skin integrity is maintained or improved  Description: INTERVENTIONS:  - Identify patients at risk for skin breakdown  - Assess and monitor skin integrity  - Assess and monitor nutrition and hydration status  - Monitor labs   - Assess for incontinence   - Turn and reposition patient  - Assist with mobility/ambulation  - Relieve pressure over bony prominences  - Avoid friction and shearing  - Provide appropriate hygiene as needed including keeping skin clean and dry  - Evaluate need for skin moisturizer/barrier cream  - Collaborate with interdisciplinary team   - Patient/family teaching  - Consider wound care consult   Outcome: Progressing

## 2022-12-01 NOTE — ASSESSMENT & PLAN NOTE
· reportedly very difficult to arouse at her nursing facility  · CT head and C-spine negative for acute abnormality  · CT chest/abdomen/pelvis: Large bilateral effusions  · Blood gas shows that patient was hypercapnic, likely due to bilateral effusion  · 2D echo ordered shows normal EF, severe left atrial dilation, unable to evaluate diastolic dysfunction   · Placement paced on BiPAP, diuresed with IV Lasix and eventually wean to nasal cannula  · Pulmonary consulted evaluated, recommend no further BiPAP, or blood gases  · Patient had been a DNR/DNI, further goals of care discussion had and family was agreeable to comfort care measures  · Code status changed to level 4, comfort measures in place  · Hospice consulted, case management aware, possible discharge to previous facility with hospice

## 2022-12-01 NOTE — PROGRESS NOTES
Progress Note - Pulmonary   Belvin Marker 79 y o  female MRN: [id number]  Unit/Bed#: [place] [address] -01 Encounter: [id number]    Assessment/Plan:    1  Acute hypercapnic respiratory failure likely multifaceted as listed below       -  transition to 2 L 97%, patient does not wear home O2       -  continue saturations greater than 88%       -  pulmonary toilet:  Deep breathing cough, OOB as tolerated, IS Q 1 hr       -  recommend overnight pulse ox and morning ABG prior to discharge to determine if patient will need BiPAP       -  continue to trend BMP    2  B/L moderate/large pleural effusion likely secondary to AFib/PHTN       -  currently on IV Lasix 40 mg b i d        -  will continue diuresing       -  if hypoxia worsens will consider thoracentesis       -  may consider goals of care discussion    3  AFib w/ mild PHTN 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       -  11/31/2022-  Echo   EF 54%     PA pressure 33 mmHg   BNP 8405       -  systolic function is normal, unable to assess diastolic dysfunction, RV systolic function low       -  Continue:  Lopressor, Xarelto    4  Alzheimer's       -  will continue appropriate sleep-wake cycles       -  will continue supportive care        Chief Complaint:    "Good morning"    Subjective:    Duane Gambino was comfortably sitting in her bed  No significant overnight events reported  Patient currently denying any fevers, chills, hemoptysis, headaches, night sweats, pleuritic chest pain, or palpitations  Objective:    Vitals: Blood pressure 113/59, pulse 95, temperature 98 8 °F (37 1 °C), resp  rate 12, height 4' 10" (1 473 m), weight 69 9 kg (154 lb), SpO2 97 %, not currently breastfeeding  2,Body mass index is 32 19 kg/m²        Intake/Output Summary (Last 24 hours) at 12/1/2022 0731  Last data filed at 12/1/2022 0501  Gross per 24 hour   Intake --   Output 1778 ml   Net -1778 ml       Invasive Devices     Peripheral Intravenous Line  Duration           Peripheral IV 12/01/22 Right;Ventral (anterior) Forearm <1 day          Drain  Duration           External Urinary Catheter <1 day                Physical Exam:   Physical Exam  Constitutional:       General: She is not in acute distress  Appearance: Normal appearance  She is normal weight  She is not ill-appearing  HENT:      Head: Normocephalic and atraumatic  Nose: Nose normal  No congestion or rhinorrhea  Mouth/Throat:      Mouth: Mucous membranes are dry  Pharynx: Oropharynx is clear  No oropharyngeal exudate or posterior oropharyngeal erythema  Cardiovascular:      Rate and Rhythm: Normal rate and regular rhythm  Pulses: Normal pulses  Heart sounds: Normal heart sounds  No murmur heard  No friction rub  No gallop  Pulmonary:      Effort: Pulmonary effort is normal  No tachypnea, bradypnea, accessory muscle usage or respiratory distress  Breath sounds: Decreased air movement present  No stridor  Decreased breath sounds present  No wheezing, rhonchi or rales  Comments: Diminished aeration  Chest:      Chest wall: No tenderness  Abdominal:      General: Abdomen is flat  Bowel sounds are normal  There is no distension  Palpations: Abdomen is soft  There is no mass  Musculoskeletal:         General: No swelling or tenderness  Normal range of motion  Cervical back: Normal range of motion  No rigidity or tenderness  Skin:     General: Skin is warm and dry  Coloration: Skin is not jaundiced or pale  Neurological:      General: No focal deficit present  Mental Status: She is alert and oriented to person, place, and time  Mental status is at baseline  Psychiatric:         Mood and Affect: Mood normal          Behavior: Behavior normal          Labs:  I have personally reviewed pertinent lab results 11/31/2022    Imaging and other studies: I have personally reviewed pertinent films in PACS     Chest CT- 11/30/2022- bilateral compressive atelectasis, large bilateral effusion,, right upper lobe pulmonary nodule

## 2022-12-01 NOTE — OCCUPATIONAL THERAPY NOTE
Occupational Therapy Evaluation(time=[id number])     Patient Name: Daniele Herrera  PSGCZ'O Date: 12/1/2022  Problem List  Principal Problem:    Encephalopathy acute  Active Problems:    Pulmonary HTN ([facility] )    Chronic a-fib ([facility] )    Late onset Alzheimer's disease without behavioral disturbance (HCC)    Acute hypercapnic respiratory failure ([facility] )    Past Medical History  Past Medical History:   Diagnosis Date    Alzheimer's dementia ([facility] )     Atrial fibrillation ([place] [address] )     Depression     Hyperlipemia     Hypertension      Past Surgical History  Past Surgical History:   Procedure Laterality Date    BREAST SURGERY             12/01/22 0940   Note Type   Note type Evaluation   Pain Assessment   Pain Assessment Tool FLACC   Pain Rating: FLACC (Rest) - Face 0   Pain Rating: FLACC (Rest) - Legs 0   Pain Rating: FLACC (Rest) - Activity 0   Pain Rating: FLACC (Rest) - Cry 0   Pain Rating: FLACC (Rest) - Consolability 0   Score: FLACC (Rest) 0   Restrictions/Precautions   Weight Bearing Precautions Per Order No   Other Precautions Cognitive; Chair Alarm; Bed Alarm;O2;Fall Risk;Telemetry   Home Living   Type of Home Assisted living  ([facility])   Home Layout One level   Prior Function   Lives With Facility staff   Falls in the last 6 months 1 to 4  (1)   Lifestyle   Autonomy PTA family(i e dtrs) states pt had assistance with her ADLs; states independence with her transfers, ambulation--w/o device; +falls=1   Reciprocal Relationships 6 children   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 1  Total Assistance   LB Bathing Assistance 1  Total Assistance   [address] 1  Total Assistance   [address] 1  [address] [facility]  1 Total Assistance   Bed Mobility   Rolling R 2  Maximal assistance   Additional items Assist x 2; Increased time required;Verbal cues;LE management   Rolling L 2  Maximal assistance   Additional items Assist x 2; Increased time required;Verbal cues;LE management   Supine to Sit 2  Maximal assistance   Additional items Assist x 2; Increased time required;Verbal cues;LE management   Sit to Supine 2  Maximal assistance   Additional items Assist x 2; Increased time required;Verbal cues;LE management   Transfers   Sit to Stand 2  Maximal assistance   Additional items Assist x 2; Increased time required;Verbal cues   Stand to Sit 2  Maximal assistance   Additional items Assist x 2; Increased time required;Verbal cues   Stand pivot 2  Maximal assistance   Additional items Assist x 2; Increased time required;Verbal cues   Functional Mobility   Functional Mobility   (recommend hoyerlift for OOB with nsg)   Balance   Static Sitting Poor +   Dynamic Sitting Poor   Static Standing Poor -   Activity Tolerance   Activity Tolerance Patient limited by fatigue   Medical Staff Made Aware nsg, P T    RUE Assessment   RUE Assessment X  (limited pt effort; actively moving b/l UE; PROM=WFLs)   RUE Strength   RUE Overall Strength   (3-/5 throughout; limited pt effort)   LUE Assessment   LUE Assessment X  (limited pt effort; actively moving b/l UE; PROM=WFLs)   LUE Strength   LUE Overall Strength   (3-/5 throughout; limited pt effort)   Hand Function   Gross Motor Coordination Functional   Fine Motor Coordination Functional   Sensation   Light Touch No apparent deficits   Proprioception   Proprioception No apparent deficits   Vision-Basic Assessment   Current Vision   (no glasses noted; pt keeping eyes closed for most of the evaluation)   Vision - Complex Assessment   Acuity   (unable to assess)   Psychosocial   Psychosocial (WDL) X   Patient Behaviors/Mood Flat affect   Perception   Inattention/Neglect   (unable to assess)   Cognition   Overall Cognitive Status Impaired   Arousal/Participation Lethargic   Attention Difficulty attending to directions   Orientation Level   (pt not responding to any orientation questions)   Memory Decreased long term memory;Decreased recall of biographical information;Decreased short term memory;Decreased recall of recent events;Decreased recall of precautions   Following Commands Follows one step commands inconsistently   Comments hx Alzheimer's   Assessment   Limitation Decreased ADL status; Decreased UE ROM; Decreased UE strength;Decreased Safe judgement during ADL;Decreased cognition;Decreased endurance;Decreased high-level ADLs   Prognosis Fair   Assessment Pt is a 81y/o female admitted to the hospital 2* decrease responsiveness  Pt noted with large b/l effusions, acute encephalopathy  Pt with PMH Alzheimer's dementia, A-fib, depression, HTN  PTA family(i e dtrs) states pt had assistance with her ADLs; states independence with her transfers, ambulation--w/o device; +falls=1  During initial eval, pt demonstrated deficits with her functional balance, functional mobility, ADL status, transfer safety, b/l UE strength, activity tolerance(currently poor=5-10mins), and cognition(i e orientation, memory, problem-solving, judgement/safety, attention)  If pt is able to demonstrate tx carryover, pt would benefit from continued OT tx for the above deficits  2-3xwk/1-2wks  The patient's raw score on the AM-PAC Daily Activity inpatient short form is 6, standardized score is10  , less than 39 4  Patients at this level are likely to benefit from discharge to post-acute rehabilitation services  Please refer to the recommendation of the Occupational Therapist for safe discharge planning  Goals   Patient Goals unable to assess   STG Time Frame   (1-7 days)   Short Term Goal #1 Pt will demonstrate improved activity tolerance to good(20-30mins) and standing tolerance to 3-5mins to assist with ADLs     Short Term Goal #2 Pt will tolerate continued cognitive/home-safety assessment and appropriate d/c recommendations will be provided  Short Term Goal  Pt will demonstrate Don with their bed mobility to facilitate EOB ADLs  LTG Time Frame   (7-14 days)   Long Term Goal #1 Pt will demonstrate proper walker/transfer safety 100% of the time  Long Term Goal #2 Pt will demonstrate improved functional balance by 1 grade to assist with ADLs/transfers  Long Term Goal Pt will demonstrate Don with her UE and mod A with her LE bathing/dressing  Plan   Treatment Interventions ADL retraining;Functional transfer training;UE strengthening/ROM; Endurance training;Cognitive reorientation;Patient/family training;Equipment evaluation/education; Compensatory technique education;Continued evaluation   Goal Expiration Date 12/15/22   OT Treatment Day 0   OT Frequency 2-3x/wk   Recommendation   OT Discharge Recommendation Return to facility with rehabilitation services   AM-PAC Daily Activity Inpatient   Lower Body Dressing 1   Bathing 1   Toileting 1   Upper Body Dressing 1   Grooming 1   Eating 1   Daily Activity Raw Score 6   Turning Head Towards Sound 2   Follow Simple Instructions 2   Low Function Daily Activity Raw Score 10   Low Function Daily Activity Standardized Score 17 31   AM-PAC Applied Cognition Inpatient   Following a Speech/Presentation 2   Understanding Ordinary Conversation 1   Taking Medications 1   Remembering Where Things Are Placed or Put Away 1   Remembering List of 4-5 Errands 1   Taking Care of Complicated Tasks 1   Applied Cognition Raw Score 7   Applied Cognition Standardized Score 15 17   Joana Dacosta

## 2022-12-01 NOTE — ACP (ADVANCE CARE PLANNING)
Given as patient has acute respiratory failure with hypercapnia with bilateral pleural effusions  Patient does have good urine output, but recent lab shows worsening renal functions, and hypernatremia  Patient was placed on BiPAP with no significant improvement in mental status  Recent 2D echo showing severely dilated left atrium, with suspected diastolic dysfunction though unable to evaluate given atrial fibrillation  Pulmonary evaluated recommending no further BiPAP and blood gas  Given patient's poor prognosis, have further goals of care discussion with family at bedside  After extensive conversation, family reports that this was discussed previously in the past, that she would like to be comfortable with no aggressive treatment  Ultimately decided that patient be comfort care, and transition to hospice  [facility] is [facility], will reach out to case management    Hospice consulted    No further lab draws  Comfort care orders in place  Code status changed to level for comfort care

## 2022-12-01 NOTE — UTILIZATION REVIEW
Initial Clinical Review    OBSERVATION 11/30 AND CHANGED TO INPATIENT ON 11/30 @ 1540 - hypercapnic and hypoxemic respiratory failure, BIPAP, encephalopathy    Admission: Date/Time/Statement:   Admission Orders (From admission, onward)     Ordered        11/30/22 1540  Inpatient Admission  Once                      Orders Placed This Encounter   Procedures   • Inpatient Admission     Standing Status:   Standing     Number of Occurrences:   1     Order Specific Question:   Level of Care     Answer:   Level 2 Stepdown / HOT [14]     Order Specific Question:   Estimated length of stay     Answer:   More than 2 Midnights     Order Specific Question:   Certification     Answer:   I certify that inpatient services are medically necessary for this patient for a duration of greater than two midnights  See H&P and MD Progress Notes for additional information about the patient's course of treatment  ED Arrival Information     Expected   -    Arrival   11/30/2022 00:11    Acuity   Emergent            Means of arrival   Ambulance    Escorted by   [facility] ([address])    Service   Hospitalist    Admission type   Emergency            Arrival complaint   Altered Mental Status           Chief Complaint   Patient presents with   • Medical Problem     Pt [facility]  Per EMS, staff found pt unresponsive for an unknown amount of time but responds to painful stimuli  Per staff at [facility], pt has been recently getting over a cold  Initial Presentation: 79 y o  female presents to the ED via EMS from [facility] with c/o decreased responsiveness  PMH: Alzheimer's disease, pulmonary HTN, PAF, embolic CVA, recent sepsis, Alzheimers w/o behavioral disturbance  In the ED labs show hypercapnic and hypoxemic respiratory failure  Imaging shows large bilateral effusions, bilat basilar compressive atelectatic changes, exclude superimposed infection, 10 mm nodule RUL  Has elevated proBNP  On exam she has crackles bilaterally, is somnolent, difficult to arouse  She was treated with IV Lasix x 2, IV antibiotics  She is admitted to OBSERVATION status to Level 2 SD initially and then changed to INPATIENT status with Acute Encephalopathy - IV Lasix, assess UO, IV antibiotics, Pulmonary consult, Echo  Acute hypercapnic respiratory failure - NC oxygen and BIPAP for a short time, back on oxygen @ 2L NC  Post admit note - had good UO post IV Lasix, continue IV Lasix 40 mg BID, NPO, swallow eval       11/30 Pulmonary Consult - Acute on chronic hypercapnic resp failure, bilat pleural effusions, chronic dCHF, encephalopathy, advanced Alzheimer's disease - will d/c BIPAP and put on NC oxygen, monitor neuro status, no thoracentesis, continue diuresis  Date: 12/1   Day 2:   Initially level 2 SD and today transitioned to [facility] Level of care  Off BIPAP and on oxygen NC 2L, will get overnight pulse ox and morning ABG prior to d/c to determine ongoing need for BIPAP  Continue diuresing with IV Lasix 40 mg BID, goals of care discussion occurred  Pt is DNR/DNI  She remains encephalopathic today  Will not do any more BIPAP as it was not a significant help  On exam she had decreased aeration, decreased breath sounds and air movement  VS stable        ED Triage Vitals   Temperature Pulse Respirations Blood Pressure SpO2   11/30/22 0156 11/30/22 0015 11/30/22 0015 11/30/22 0015 11/30/22 0015   (!) 95 1 °F (35 1 °C) 76 19 146/63 95 %      Temp Source Heart Rate Source Patient Position - Orthostatic VS BP Location FiO2 (%)   11/30/22 0156 11/30/22 0015 11/30/22 0015 11/30/22 0015 --   Rectal Monitor Lying Right arm       Pain Score       11/30/22 0600       No Pain          Wt Readings from Last 1 Encounters:   11/30/22 69 9 kg (154 lb)     Additional Vital Signs:   12/01/22 11:01:40 98 °F (36 7 °C) 93 19 106/59 75 99 % -- -- -- -- --   12/01/22 10:07:18 -- 101 -- 106/60 75 95 % -- -- -- -- --   12/01/22 0800 -- -- -- -- -- -- 28 2 L/min Nasal cannula -- --   12/01/22 07:04:35 98 8 °F (37 1 °C) 95 -- 113/59 77 97 % -- -- -- -- --   12/01/22 03:34:10 97 8 °F (36 6 °C) 89 12 110/58 75 94 % -- -- -- -- --   12/01/22 01:06:46 -- 97 -- -- -- 94 % -- -- -- -- --   12/01/22 00:32:53 98 5 °F (36 9 °C) -- 20 118/58 78 -- -- -- -- -- --   11/30/22 19:43:01 -- 85 20 108/53 71 97 % -- -- -- -- --   11/30/22 1700 -- -- -- -- -- -- 28 2 L/min Nasal cannula -- --   11/30/22 1522 -- -- -- -- -- -- -- -- -- Full face mask --   11/30/22 15:11:39 97 9 °F (36 6 °C) 109 Abnormal  18 112/68 83 94 % -- -- BiPAP -- Lying   11/30/22 1300 -- -- -- -- -- -- -- -- BiPAP -- --   11/30/22 1145 -- 77 -- 131/77 -- -- -- -- -- -- --   11/30/22 09:06:44 -- 77 22 -- -- 96 % -- -- -- -- --   11/30/22 0900 -- -- -- -- -- -- 28 2 L/min BiPAP -- --   11/30/22 0851 -- -- -- -- -- 97 % -- -- -- Full face mask --   11/30/22 0743 96 1 °F (35 6 °C) Abnormal  -- -- -- -- -- -- -- -- -- --   11/30/22 0728 -- 110 Abnormal  19 131/77 -- 95 % -- -- BiPAP -- Lying   11/30/22 0721 -- -- -- -- -- 97 % -- -- -- Full face mask --   11/30/22 0615 -- 82 17 135/60 -- 98 % 28 2 L/min Nasal cannula -- Lying   11/30/22 0600 -- 82 13 124/59 -- 98 % 28 2 L/min Nasal cannula -- Lying   11/30/22 0545 -- 78 13 83/48 Abnormal  -- 96 % 28 2 L/min Nasal cannula -- Lying   11/30/22 0445 -- 88 20 108/66 -- 96 % 28 2 L/min Nasal cannula -- Lying   11/30/22 0345 -- 80 15 98/63 -- 94 % 28 2 L/min Nasal cannula -- Lying   11/30/22 0300 -- 76 14 104/54 -- 92 % 28 2 L/min Nasal cannula -- Lying   11/30/22 0246 -- 74 16 104/54 -- 97 % -- -- None (Room air) -- Lying   11/30/22 0156 95 1 °F (35 1 °C) Abnormal   -- -- -- -- -- -- -- -- -- --   Temp: provider aware at 11/30/22 0156   11/30/22 0144 -- -- -- -- -- 96 % 28 2 L/min Nasal cannula -- --   11/30/22 0140 -- 68 20 107/66 -- 88 % Abnormal  -- -- None (Room air) -- Lying     Pertinent Labs/Diagnostic Test Results:     11/30 ECG - Atrial fibrillation with premature ventricular or aberrantly conducted complexes  Nonspecific T wave abnormality  Abnormal ECG    11/30 Echo - •  Left Ventricle: Left ventricular cavity size is normal  Wall thickness is normal  The left ventricular ejection fraction is 54% by single dimension measurement  Systolic function is normal  Wall motion is normal   •  Right Ventricle: Systolic function is low normal  Normal tricuspid annular plane systolic excursion (TAPSE) = 1 7 cm  •  Left Atrium: The atrium is severely dilated (>48 mL/m2)  •  Mitral Valve: There is mild annular calcification  •  Tricuspid Valve: There is mild to moderate regurgitation  CT head without contrast   Final Result by Shelley Duncan MD (11/30 0128)      No acute intracranial abnormality  CT cervical spine without contrast   Final Result by Shelley Duncan MD (11/30 0157)      No acute cervical spine fracture or traumatic malalignment  CT chest abdomen pelvis w contrast   Final Result by Liza Gore MD (11/30 0151)      Large bilateral effusions     Bilateral basilar compressive atelectatic changes  Superimposed infection must be excluded clinically    10 mm nodule in the right upper lobe  I suspect this is a focus of pulmonary edema  Recommend imaging follow-up to    resolution           Results from last 7 days   Lab Units 12/01/22  1121 11/30/22 0724 11/30/22  0030   WBC Thousand/uL 5 13 4 93 4 64   HEMOGLOBIN g/dL 10 4* 9 8* 10 6*   HEMATOCRIT % 34 2* 31 7* 34 4*   PLATELETS Thousands/uL 180 187 196   NEUTROS ABS Thousands/µL 3 42 3 03 2 59         Results from last 7 days   Lab Units 11/30/22  0724 11/30/22  0030   SODIUM mmol/L 148* 147   POTASSIUM mmol/L 3 8 4 5   CHLORIDE mmol/L 109* 108   CO2 mmol/L 36* 37*   ANION GAP mmol/L 3* 2*   BUN mg/dL 19 21   CREATININE mg/dL 1 21 1 30   EGFR ml/min/1 73sq m 41 38   CALCIUM mg/dL 8 8 9 4   MAGNESIUM mg/dL 2 0  --      Results from last 7 days   Lab Units 11/30/22 0724 11/30/22  0030   AST U/L 15 16   ALT U/L 13 11*   ALK PHOS U/L 97 111   TOTAL PROTEIN g/dL 6 3* 6 7   ALBUMIN g/dL 2 3* 2 6*   TOTAL BILIRUBIN mg/dL 0 27 0 30     Results from last 7 days   Lab Units 12/01/22  1131 12/01/22  0759 11/30/22  2043 11/30/22  1735 11/30/22  0744   POC GLUCOSE mg/dl 84 87 91 97 96     Results from last 7 days   Lab Units 11/30/22  0724 11/30/22  0030   GLUCOSE RANDOM mg/dL 83 103     Results from last 7 days   Lab Units 11/30/22  0439   PH PETER  7 293*   PCO2 PETER mm Hg 71 8*   PO2 PETER mm Hg 53 1*   HCO3 PETER mmol/L 34 0*   BASE EXC PETER mmol/L 5 7   O2 CONTENT PETER ml/dL 12 4   O2 HGB, VENOUS % 82 5*         Results from last 7 days   Lab Units 11/30/22  0030   CK TOTAL U/L 13*             Results from last 7 days   Lab Units 11/30/22  0724 11/30/22  0428   PROTIME seconds 18 4* 20 1*   INR  1 54* 1 72*   PTT seconds 30 30         Results from last 7 days   Lab Units 12/01/22  0536 11/30/22  0724 11/30/22  0428   PROCALCITONIN ng/ml 0 07 0 07 0 07     Results from last 7 days   Lab Units 11/30/22  0724 11/30/22  0428   LACTIC ACID mmol/L 0 8 0 7             Results from last 7 days   Lab Units 11/30/22  0623   NT-PRO BNP pg/mL 1,940*     Results from last 7 days   Lab Units 11/30/22  0840   CLARITY UA  Clear   COLOR UA  Yellow   SPEC GRAV UA  1 010   PH UA  6 0   GLUCOSE UA mg/dl Negative   KETONES UA mg/dl Negative   BLOOD UA  Negative   PROTEIN UA mg/dl Negative   NITRITE UA  Negative   BILIRUBIN UA  Negative   UROBILINOGEN UA E U /dl 0 2   LEUKOCYTES UA  Negative     Results from last 7 days   Lab Units 11/30/22  0024   BLOOD CULTURE  No Growth at 24 hrs  No Growth at 24 hrs           ED Treatment:   Medication Administration from 11/30/2022 0011 to 11/30/2022 2624       Date/Time Order Dose Route Action     11/30/2022 0100 EST iohexol (OMNIPAQUE) 350 MG/ML injection (SINGLE-DOSE) 100 mL 100 mL Intravenous Given     11/30/2022 0439 EST ceftriaxone (ROCEPHIN) 2 g/50 mL in dextrose IVPB 2,000 mg Intravenous New Bag     11/30/2022 0729 EST furosemide (LASIX) injection 40 mg 40 mg Intravenous Given     11/30/2022 0551 EST furosemide (LASIX) injection 40 mg 0 mg Intravenous Hold        Past Medical History:   Diagnosis Date   • Alzheimer's dementia ([facility] )    • Atrial fibrillation ([place] [address] )    • Depression    • Hyperlipemia    • Hypertension      Present on Admission:  • Late onset Alzheimer's disease without behavioral disturbance (HCC)  • Chronic a-fib (HCC)  • Pulmonary HTN (HCC)      Admitting Diagnosis: Altered mental status [R41 82]  Pneumonia [J18 9]  Hypoxia [R09 02]  Sepsis ([place] [address] ) [A41 9]  Known medical problems [Z78 9]  Hypercapnic respiratory failure ([place] [address] ) [J96 92]  Age/Sex: 79 y o  female  Admission Orders:  Scheduled Medications:  aspirin, 81 mg, Oral, Daily  atorvastatin, 40 mg, Oral, QPM  busPIRone, 5 mg, Oral, TID  donepezil, 5 mg, Oral, HS  furosemide, 40 mg, Intravenous, BID (diuretic)  metoprolol tartrate, 25 mg, Oral, BID  montelukast, 10 mg, Oral, QPM  rivaroxaban, 10 mg, Oral, Daily With Dinner  sertraline, 25 mg, Oral, Daily      Continuous IV Infusions:  IV fluids @ 75 ml/hr d/c 11/30 @ 0521     PRN Meds:     Tele  Continuous pulse ox  Dysphagia diet  Level 2 SD  IP CONSULT TO PULMONOLOGY    Network Utilization Review Department  ATTENTION: Please call with any questions or concerns to [phone] and carefully listen to the prompts so that you are directed to the right person  All voicemails are confidential   Jomar Ugalde all requests for admission clinical reviews, approved or denied determinations and any other requests to dedicated fax number below belonging to the campus where the patient is receiving treatment   List of dedicated fax numbers for the Facilities:  FACILITY NAME UR FAX NUMBER   ADMISSION DENIALS (Administrative/Medical Necessity) [phone]   [address] (Maternity/NICU/Pediatrics) [place] [address] [phone]     Shauna Aguirre [address] [facility] 77 [phone]   [address] [place] [address] [facility]  [phone] [address] [place] [address] 815 [facility] [phone]

## 2022-12-01 NOTE — PLAN OF CARE
Problem: OCCUPATIONAL THERAPY ADULT  Goal: Performs self-care activities at highest level of function for planned discharge setting  See evaluation for individualized goals  Description: Treatment Interventions: ADL retraining, Functional transfer training, UE strengthening/ROM, Endurance training, Cognitive reorientation, Patient/family training, Equipment evaluation/education, Compensatory technique education, Continued evaluation          See flowsheet documentation for full assessment, interventions and recommendations  Note: Limitation: Decreased ADL status, Decreased UE ROM, Decreased UE strength, Decreased Safe judgement during ADL, Decreased cognition, Decreased endurance, Decreased high-level ADLs  Prognosis: Fair  Assessment: Pt is a 81y/o female admitted to the hospital 2* decrease responsiveness  Pt noted with large b/l effusions, acute encephalopathy  Pt with PMH Alzheimer's dementia, A-fib, depression, HTN  PTA family(i e dtrs) states pt had assistance with her ADLs; states independence with her transfers, ambulation--w/o device; +falls=1  During initial eval, pt demonstrated deficits with her functional balance, functional mobility, ADL status, transfer safety, b/l UE strength, activity tolerance(currently poor=5-10mins), and cognition(i e orientation, memory, problem-solving, judgement/safety, attention)  If pt is able to demonstrate tx carryover, pt would benefit from continued OT tx for the above deficits  2-3xwk/1-2wks  The patient's raw score on the AM-PAC Daily Activity inpatient short form is 6, standardized score is10  , less than 39 4  Patients at this level are likely to benefit from discharge to post-acute rehabilitation services  Please refer to the recommendation of the Occupational Therapist for safe discharge planning       OT Discharge Recommendation: Return to facility with rehabilitation services

## 2022-12-01 NOTE — PROGRESS NOTES
[address] [facility]  Progress Note - Leonardo Carl 1940, 79 y o  female MRN: [id number]  Unit/Bed#: [place] [address] [address] 212-01 Encounter: [id number]  Primary Care Provider: Mc Duckworth MD   Date and time admitted to hospital: 11/30/2022 12:11 AM    * Encephalopathy acute  Assessment & Plan  · reportedly very difficult to arouse at her nursing facility  · CT head and C-spine negative for acute abnormality  · CT chest/abdomen/pelvis: Large bilateral effusions  · Blood gas shows that patient was hypercapnic, likely due to bilateral effusion  · 2D echo ordered shows normal EF, severe left atrial dilation, unable to evaluate diastolic dysfunction   · Placement paced on BiPAP, diuresed with IV Lasix and eventually wean to nasal cannula  · Pulmonary consulted evaluated, recommend no further BiPAP, or blood gases  · Patient had been a DNR/DNI, further goals of care discussion had and family was agreeable to comfort care measures  · Code status changed to level 4, comfort measures in place  · Hospice consulted, case management aware, possible discharge to previous facility with hospice      Acute hypercapnic respiratory failure ([address] )  Assessment & Plan  -see encephalopathy section above    Late onset Alzheimer's disease without behavioral disturbance ([facility] )  Assessment & Plan  -history of Alzheimer's disease  Supportive care    Chronic a-fib (HCC)  Assessment & Plan  Continue p o  Lopressor, discontinue aspirin, Xarelto as patient now on comfort care        VTE Pharmacologic Prophylaxis:   Pharmacologic: none  Mechanical VTE Prophylaxis in Place: Yes    Patient Centered Rounds: I have performed bedside rounds with nursing staff today  Discussions with Specialists or Other Care Team Provider: CM, Pulmonary    Education and Discussions with Family / Patient: family bedside    Time Spent for Care: 30 minutes    More than 50% of total time spent on counseling and coordination of care as described above     Current Length of Stay: 1 day(s)    Current Patient Status: Inpatient   Certification Statement: The patient will continue to require additional inpatient hospital stay due to hospice evaluation    Discharge Plan: pending    Code Status: Level 4 - Comfort Care      Subjective:   No events overnight  Currently on nasal cannula  Appears comfortable  Family at bedside, discussed [address] and agreeable for comfort care and hospice  Objective:     Vitals:   Temp (24hrs), Av 4 °F (36 9 °C), Min:97 8 °F (36 6 °C), Max:98 8 °F (37 1 °C)    Temp:  [97 8 °F (36 6 °C)-98 8 °F (37 1 °C)] 98 8 °F (37 1 °C)  HR:  [] 84  Resp:  [12-20] 15  BP: ()/(44-60) 94/44  SpO2:  [94 %-99 %] 98 %  Body mass index is 32 19 kg/m²  Input and Output Summary (last 24 hours): Intake/Output Summary (Last 24 hours) at 2022 1546  Last data filed at 2022 0501  Gross per 24 hour   Intake --   Output 1278 ml   Net -1278 ml       Physical Exam:     Physical Exam  Vitals and nursing note reviewed  Constitutional:       Appearance: Normal appearance  She is normal weight  She is ill-appearing  Comments: Difficult to arouse   HENT:      Head: Normocephalic and atraumatic  Cardiovascular:      Rate and Rhythm: Normal rate  Rhythm irregular  Pulmonary:      Breath sounds: Rales present  No wheezing or rhonchi  Abdominal:      General: Bowel sounds are normal  There is no distension  Palpations: Abdomen is soft  Musculoskeletal:         General: No swelling  Right lower leg: No edema  Left lower leg: No edema  Skin:     General: Skin is warm and dry     Neurological:      Comments: Unable to evaluate         Additional Data:     Labs:    Results from last 7 days   Lab Units 22  1121   WBC Thousand/uL 5 13   HEMOGLOBIN g/dL 10 4*   HEMATOCRIT % 34 2*   PLATELETS Thousands/uL 180   NEUTROS PCT % 67   LYMPHS PCT % 23   MONOS PCT % 7   EOS PCT % 1     Results from last 7 days   Lab Units 12/01/22  1121 11/30/22  0724   SODIUM mmol/L 148* 148*   POTASSIUM mmol/L 3 6 3 8   CHLORIDE mmol/L 107 109*   CO2 mmol/L 40* 36*   BUN mg/dL 19 19   CREATININE mg/dL 1 34* 1 21   ANION GAP mmol/L 1* 3*   CALCIUM mg/dL 8 8 8 8   ALBUMIN g/dL  --  2 3*   TOTAL BILIRUBIN mg/dL  --  0 27   ALK PHOS U/L  --  97   ALT U/L  --  13   AST U/L  --  15   GLUCOSE RANDOM mg/dL 89 83     Results from last 7 days   Lab Units 11/30/22  0724   INR  1 54*     Results from last 7 days   Lab Units 12/01/22  1131 12/01/22  0759 11/30/22  2043 11/30/22  1735 11/30/22  0744   POC GLUCOSE mg/dl 84 87 91 97 96         Results from last 7 days   Lab Units 12/01/22  0536 11/30/22  0724 11/30/22  0428   LACTIC ACID mmol/L  --  0 8 0 7   PROCALCITONIN ng/ml 0 07 0 07 0 07           * I Have Reviewed All Lab Data Listed Above  * Additional Pertinent Lab Tests Reviewed: All Labs For Current Hospital Admission Reviewed    Imaging:    CT head without contrast    Result Date: 11/30/2022  Impression: No acute intracranial abnormality  Workstation performed: [id number]     CT cervical spine without contrast    Result Date: 11/30/2022  Impression: No acute cervical spine fracture or traumatic malalignment  Workstation performed: [id number]     CT chest abdomen pelvis w contrast    Result Date: 11/30/2022  Impression: Large bilateral effusions     Bilateral basilar compressive atelectatic changes  Superimposed infection must be excluded clinically    10 mm nodule in the right upper lobe  I suspect this is a focus of pulmonary edema  Recommend imaging follow-up to resolution  The study was marked in [facility] for immediate notification  Workstation performed: [id number]       Recent Cultures (last 7 days):     Results from last 7 days   Lab Units 11/30/22  0024   BLOOD CULTURE  No Growth at 24 hrs  No Growth at 24 hrs         Last 24 Hours Medication List:   Current Facility-Administered Medications   Medication Dose Route Frequency Provider Last Rate • glycopyrrolate  0 1 mg Intravenous Q4H PRN Brandi Pagan MD     • haloperidol lactate  0 5 mg Intravenous Q2H PRN Brandi Pagan MD     • HYDROmorphone  0 3 mg Intravenous Q2H PRN Brandi Pagan MD     • LORazepam  1 mg Intravenous Q10 Min PRN Brandi Pagan MD     • metoprolol tartrate  25 mg Oral BID Pierce Rubins, DO     • montelukast  10 mg Oral QPM Pierce Rubins, DO          Today, Patient Was Seen By: Brandi Pagan MD    ** Please Note: Dictation voice to text software may have been used in the creation of this document   **

## 2022-12-02 ENCOUNTER — HOME CARE VISIT (OUTPATIENT)
Dept: HOME HEALTH SERVICES | Facility: HOME HEALTHCARE | Age: 82
End: 2022-12-02

## 2022-12-02 VITALS
WEIGHT: 154 LBS | TEMPERATURE: 99.3 F | SYSTOLIC BLOOD PRESSURE: 111 MMHG | DIASTOLIC BLOOD PRESSURE: 53 MMHG | RESPIRATION RATE: 14 BRPM | OXYGEN SATURATION: 96 % | HEIGHT: 58 IN | BODY MASS INDEX: 32.32 KG/M2 | HEART RATE: 94 BPM

## 2022-12-02 RX ORDER — ACETAMINOPHEN 325 MG/1
650 TABLET ORAL EVERY 6 HOURS PRN
Status: DISCONTINUED | OUTPATIENT
Start: 2022-12-02 | End: 2022-12-02 | Stop reason: HOSPADM

## 2022-12-02 RX ADMIN — ACETAMINOPHEN 650 MG: 325 TABLET, FILM COATED ORAL at 01:01

## 2022-12-02 NOTE — PLAN OF CARE
Problem: Potential for Falls  Goal: Patient will remain free of falls  Description: INTERVENTIONS:  - Educate patient/family on patient safety including physical limitations  - Instruct patient to call for assistance with activity   - Consult OT/PT to assist with strengthening/mobility   - Keep Call bell within reach  - Keep bed low and locked with side rails adjusted as appropriate  - Keep care items and personal belongings within reach  - Initiate and maintain comfort rounds  - Make Fall Risk Sign visible to staff  - Initiate/Maintain bed alarm  - Apply yellow socks and bracelet for high fall risk patients  - Consider moving patient to room near nurses station  Outcome: Progressing     Problem: MOBILITY - ADULT  Goal: Maintain or return to baseline ADL function  Description: INTERVENTIONS:  -  Assess patient's ability to carry out ADLs; assess patient's baseline for ADL function and identify physical deficits which impact ability to perform ADLs (bathing, care of mouth/teeth, toileting, grooming, dressing, etc )  - Assess/evaluate cause of self-care deficits   - Assess range of motion  - Assess patient's mobility; develop plan if impaired  - Assess patient's need for assistive devices and provide as appropriate  - Encourage maximum independence but intervene and supervise when necessary  - Involve family in performance of ADLs  - Assess for home care needs following discharge   - Consider OT consult to assist with ADL evaluation and planning for discharge  - Provide patient education as appropriate  Outcome: Progressing  Goal: Maintains/Returns to pre admission functional level  Description: INTERVENTIONS:  - Perform BMAT or MOVE assessment daily    - Set and communicate daily mobility goal to care team and patient/family/caregiver  - Collaborate with rehabilitation services on mobility goals if consulted  - Reposition patient every 2 hours    - Out of bed for toileting  - Record patient progress and toleration of activity level   Outcome: Progressing     Problem: PAIN - ADULT  Goal: Verbalizes/displays adequate comfort level or baseline comfort level  Description: Interventions:  - Encourage patient to monitor pain and request assistance  - Assess pain using appropriate pain scale  - Administer analgesics based on type and severity of pain and evaluate response  - Implement non-pharmacological measures as appropriate and evaluate response  - Consider cultural and social influences on pain and pain management  - Notify physician/advanced practitioner if interventions unsuccessful or patient reports new pain  Outcome: Progressing     Problem: INFECTION - ADULT  Goal: Absence or prevention of progression during hospitalization  Description: INTERVENTIONS:  - Assess and monitor for signs and symptoms of infection  - Monitor lab/diagnostic results  - Monitor all insertion sites, i e  indwelling lines, tubes, and drains  - Monitor endotracheal if appropriate and nasal secretions for changes in amount and color  - [place] appropriate cooling/warming therapies per order  - Administer medications as ordered  - Instruct and encourage patient and family to use good hand hygiene technique  - Identify and instruct in appropriate isolation precautions for identified infection/condition  Outcome: Progressing  Goal: Absence of fever/infection during neutropenic period  Description: INTERVENTIONS:  - Monitor WBC    Outcome: Progressing     Problem: SAFETY ADULT  Goal: Patient will remain free of falls  Description: INTERVENTIONS:  - Educate patient/family on patient safety including physical limitations  - Instruct patient to call for assistance with activity   - Consult OT/PT to assist with strengthening/mobility   - Keep Call bell within reach  - Keep bed low and locked with side rails adjusted as appropriate  - Keep care items and personal belongings within reach  - Initiate and maintain comfort rounds  - Make Fall Risk Sign visible to staff  - Initiate/Maintain bed alarm  - Apply yellow socks and bracelet for high fall risk patients  - Consider moving patient to room near nurses station  Outcome: Progressing  Goal: Maintain or return to baseline ADL function  Description: INTERVENTIONS:  -  Assess patient's ability to carry out ADLs; assess patient's baseline for ADL function and identify physical deficits which impact ability to perform ADLs (bathing, care of mouth/teeth, toileting, grooming, dressing, etc )  - Assess/evaluate cause of self-care deficits   - Assess range of motion  - Assess patient's mobility; develop plan if impaired  - Assess patient's need for assistive devices and provide as appropriate  - Encourage maximum independence but intervene and supervise when necessary  - Involve family in performance of ADLs  - Assess for home care needs following discharge   - Consider OT consult to assist with ADL evaluation and planning for discharge  - Provide patient education as appropriate  Outcome: Progressing  Goal: Maintains/Returns to pre admission functional level  Description: INTERVENTIONS:  - Perform BMAT or MOVE assessment daily    - Set and communicate daily mobility goal to care team and patient/family/caregiver  - Collaborate with rehabilitation services on mobility goals if consulted  - Reposition patient every 2 hours    - Out of bed for toileting  - Record patient progress and toleration of activity level   Outcome: Progressing     Problem: DISCHARGE PLANNING  Goal: Discharge to home or other facility with appropriate resources  Description: INTERVENTIONS:  - Identify barriers to discharge w/patient and caregiver  - Arrange for needed discharge resources and transportation as appropriate  - Identify discharge learning needs (meds, wound care, etc )  - Arrange for interpretive services to assist at discharge as needed  - Refer to Case Management Department for coordinating discharge planning if the patient needs post-hospital services based on physician/advanced practitioner order or complex needs related to functional status, cognitive ability, or social support system  Outcome: Progressing     Problem: Knowledge Deficit  Goal: Patient/family/caregiver demonstrates understanding of disease process, treatment plan, medications, and discharge instructions  Description: Complete learning assessment and assess knowledge base    Interventions:  - Provide teaching at level of understanding  - Provide teaching via preferred learning methods  Outcome: Progressing     Problem: Prexisting or High Potential for Compromised Skin Integrity  Goal: Skin integrity is maintained or improved  Description: INTERVENTIONS:  - Identify patients at risk for skin breakdown  - Assess and monitor skin integrity  - Assess and monitor nutrition and hydration status  - Monitor labs   - Assess for incontinence   - Turn and reposition patient  - Assist with mobility/ambulation  - Relieve pressure over bony prominences  - Avoid friction and shearing  - Provide appropriate hygiene as needed including keeping skin clean and dry  - Evaluate need for skin moisturizer/barrier cream  - Collaborate with interdisciplinary team   - Patient/family teaching  - Consider wound care consult   Outcome: Progressing

## 2022-12-02 NOTE — DISCHARGE SUMMARY
[address] [facility]  Discharge- Carolynn Salcedo 1940, 79 y o  female MRN: [id number]  Unit/Bed#: [address] 212-01 Encounter: [id number]  Primary Care Provider: Joshua Sheth MD   Date and time admitted to hospital: 11/30/2022 12:11 AM    * Encephalopathy acute  Assessment & Plan  · reportedly very difficult to arouse at her nursing facility  · CT head and C-spine negative for acute abnormality  · CT chest/abdomen/pelvis: Large bilateral effusions  · Blood gas shows that patient was hypercapnic, likely due to bilateral effusion  · 2D echo ordered shows normal EF, severe left atrial dilation, unable to evaluate diastolic dysfunction   · Placement paced on BiPAP, diuresed with IV Lasix and eventually wean to nasal cannula  · Pulmonary consulted evaluated, recommend no further BiPAP, or blood gases  · Patient had been a DNR/DNI, further goals of care discussion had and family was agreeable to comfort care measures  · Code status changed to level 4, comfort measures in place  · [facility]      Acute hypercapnic respiratory failure ([facility] )  Assessment & Plan  -see encephalopathy section above    Late onset Alzheimer's disease without behavioral disturbance ([address] )  Assessment & Plan  -history of Alzheimer's disease  Supportive care    Chronic a-fib (HCC)  Assessment & Plan  Continue p o   Lopressor, discontinue aspirin, Xarelto as patient now on comfort care    Pulmonary HTN ([facility] )  Assessment & Plan  -history of pulmonary hypertension; noted on echocardiogram 2018  -on PTA Lasix 40 mg oral daily      Transition of Care Discharge Summary - [facility]    Patient Information: Carolynn Salcedo 79 y o  female MRN: [id number]  Unit/Bed#: [address] 212-01 Encounter: [id number]    Discharging Physician / Practitioner: Socorro Mauricio MD  PCP: Joshua Sheth MD  Admission Date: 11/30/2022  Discharge Date: 12/02/22    Disposition:      Other: [facility]      Reason for Admission:  Encephalopathy    Discharge Diagnoses:     Principal Problem:    Encephalopathy acute  Active Problems:    Pulmonary HTN ([address] )    Chronic a-fib (HCC)    Late onset Alzheimer's disease without behavioral disturbance (HCC)    Acute hypercapnic respiratory failure (HCC)  Resolved Problems:    * No resolved hospital problems  *      Consultations During Hospital Stay:  · IP CONSULT TO PULMONOLOGY  · IP CONSULT TO HOSPICE      Procedures Performed:     · none    Medication Adjustments and Discharge Medications:  · Medication Dosing Tapers - Please refer to Discharge Medication List for details on any medication dosing tapers (if applicable to patient)  · Discharge Medication List: See after visit summary for reconciled discharge medications  Wound Care Recommendations:  When applicable, please see wound care section of After Visit Summary  Diet Recommendations at Discharge:  Diet -        Diet Orders   (From admission, onward)             Start     Ordered    12/01/22 1415  Diet Regular; Pleasure Feed  Diet effective now        References:    Nutrtion Support Algorithm Enteral vs  Parenteral   Question Answer Comment   Diet Type Regular    Regular Pleasure Feed    RD to adjust diet per protocol? No        12/01/22 1416              Fluid Restriction - No Fluid Restriction at Discharge  Significant Findings / Test Results:     CT head without contrast    Result Date: 11/30/2022  Impression: No acute intracranial abnormality  Workstation performed: [id number]     CT cervical spine without contrast    Result Date: 11/30/2022  Impression: No acute cervical spine fracture or traumatic malalignment  Workstation performed: [id number]     CT chest abdomen pelvis w contrast    Result Date: 11/30/2022  · Impression: Large bilateral effusions     Bilateral basilar compressive atelectatic changes  Superimposed infection must be excluded clinically    10 mm nodule in the right upper lobe  I suspect this is a focus of pulmonary edema  Recommend imaging follow-up to resolution  The study was marked in [facility] for immediate notification  Workstation performed: [id number]       Hospital Course:     Andrea Engle is a 79 y o  female patient who originally presented to the hospital on 11/30/2022 due to encephalopathy  Patient also had hypercapnic and hypoxic respiratory failure  She has history of Alzheimer's dementia, pulmonary hypertension, paroxysmal atrial fibrillation, embolic CVA was [facility] for decreased responsiveness  Patient was initially placed on BiPAP and treated with IV diuresis pulmonary was consulted  Family ultimately decided DNR/DNI and comfort cares  [facility]  Please see above problem list for further details  Condition at Discharge: fair     Discharge Day Visit / Exam:     Subjective:  Patient seen examined at bedside, resting comfortably, denies any pain    Vitals: Blood Pressure: 111/53 (12/02/22 0803)  Pulse: 94 (12/02/22 0803)  Temperature: 99 3 °F (37 4 °C) (12/02/22 0157)  Temp Source: Oral (12/02/22 0013)  Respirations: 14 (12/02/22 0013)  Height: 4' 10" (147 3 cm) (11/30/22 1145)  Weight - Scale: 69 9 kg (154 lb) (11/30/22 1145)  SpO2: 96 % (12/02/22 0803)    Physical Exam:    Constitutional: Patient is in no acute distress  HEENT:  Normocephalic, atraumatic  Cardiovascular: Normal S1S2, RRR, No murmurs/rubs/gallops appreciated  Pulmonary:  Bilateral air entry, No rhonchi/rales/wheezing appreciated  Abdominal: Soft, Bowel sounds present, Non-tender, Non-distended  Extremities:  No cyanosis, clubbing or edema  Neurological:  Awake, alert    Discharge instructions/Information to patient and family:   See after visit summary section titled Discharge Instructions for information provided to patient and family        Planned Readmission: no      Discharge Statement:  I spent 35 minutes discharging the patient  This time was spent on the day of discharge  I had direct contact with the patient on the day of discharge  Greater than 50% of the total time was spent examining patient, answering all patient questions, arranging and discussing plan of care with patient as well as directly providing post-discharge instructions  Additional time then spent on discharge activities      ** Please Note: This note has been constructed using a voice recognition system **

## 2022-12-02 NOTE — CASE MANAGEMENT
Case Management Discharge Planning Note    Patient name Angella Ventura  Location [facility] [address] 2 /[address] Tejal Logan* MRN [id number]  : 1940 Date 2022       Current Admission Date: 2022  Current Admission Diagnosis:Encephalopathy acute   Patient Active Problem List    Diagnosis Date Noted   • Encephalopathy acute 2022   • Acute hypercapnic respiratory failure ([facility] ) 2022   • Severe episode of recurrent major depressive disorder, with psychotic features ([facility] ) 2021   • Late onset Alzheimer's disease without behavioral disturbance ([facility] ) 10/01/2020   • Memory disorder, plus 2020   • Lower extremity edema    • Embolic stroke 3846   • Chronic a-fib ([facility] ) 2018   • Pulmonary HTN ([facility] ) 2018   • Obesity (BMI 30-39 9) 2018   • Obstructive sleep apnea treated with continuous positive airway pressure (CPAP) 2018   • PAF (paroxysmal atrial fibrillation) ([facility] ) 10/05/2017      LOS (days): 2  Geometric Mean LOS (GMLOS) (days): 3 50  Days to GMLOS:1 7     OBJECTIVE:  Risk of Unplanned Readmission Score: 18 12         Current admission status: Inpatient   Preferred Pharmacy:   [address] [place] [address], [address] [place] [address]  Phone: [phone] Fax: [phone]    Primary Care Provider: Manuel Dumont MD    Primary Insurance: [facility]  Secondary Insurance:     DISCHARGE DETAILS:    Discharge planning discussed with[de-identified] daughter Panfilo Mac (Daughter)   [phone]     Comments - Freedom of Choice: Back to [facility]  CM contacted family/caregiver?: Yes  Were Treatment Team discharge recommendations reviewed with patient/caregiver?: Yes  Did patient/caregiver verbalize understanding of patient care needs?: Yes     Contacts  Patient Contacts: Panfilo Mac (Daughter)   [phone]  Relationship to Patient[de-identified] Family  Contact Method:  In Person  Reason/Outcome: Discharge [address]         Is the patient interested in [facility] at discharge?: No    DME Referral Provided  Referral made for DME?: No    Other Referral/Resources/Interventions Provided:  Interventions: Assisted Living, Hospice  Referral Comments: Per Bradley Finder at [facility] patient returning with [facility] as Hospice provider  Madhavi Paredes states she spoke of plans with dtr William Sosa (Daughter)   [phone] who agrees with plan  Treatment Team Recommendation: Assisted Living, Hospice  Discharge Destination Plan[de-identified] Assisted Living, Hospice  Transport at Discharge : [facility]  Dispatcher Contacted: Yes  Number/Name of Dispatcher: Roundtrip  Transported by [employer] and Unit #): [facility]SPORT  ETA of Transport (Date): 12/02/22  ETA of Transport (Time): 1100   RN, provider, accepting facility & daughter aware of above transport arrangements     Transfer Mode: Stretcher  Accompanied by: EMS personnel  Transfer Equipment: BLS devices  IMM Given (Date):: 12/02/22  IMM Given to[de-identified] Family  Family notified[de-identified] daughter William Sosa (Daughter)   [phone]       Accepting Facility Name, [address] : [facility]  Receiving Facility/Agency Phone Number: [phone]

## 2022-12-04 LAB
BACTERIA BLD CULT: NORMAL
BACTERIA BLD CULT: NORMAL

## 2022-12-05 LAB
BACTERIA BLD CULT: NORMAL
BACTERIA BLD CULT: NORMAL

## 2023-01-09 NOTE — DISCHARGE SUMMARY
Discharge- Maria Guadalupe Upton 1940, 65 y o  female MRN: [id number]    Unit/Bed#: 7T [facility] 704-02 Encounter: [id number]    Primary Care Provider: Rock Lorie MD   Date and time admitted to hospital: 7/24/2018  1:41 PM        * Acute embolic stroke [facility])   Assessment & Plan    ·   Post review of the MRI showed acute small ischemic infarct and cerebellar suspect embolic discussed with Dr Niya Harrington and Dr Saurav Turcios cardiology again will leave on Xarelto 20 mg daily aspirin 81 mg daily  As stated before her CT aids were negative echo was not resulted she can follow up as an outpatient for with Dr Jessica Toscano for the results of echo the management for change I wanted to keep the patient overnight but the patient and is adamant and does not want to stay wants to go home I did explain to her of any new signs and symptoms of stroke if occurs to return to the ER  Also she is on aspirin and Xarelto 5 monitor for any bleeding  · Started lipitor 40 mg po daily- reviewed lipid panel   · 2D echo bubble study the rest is stroke pathway orders ordered as well        Chronic a-fib ([address] )   Assessment & Plan    · Rate controled continue xarelto and bblocker, tsh normal    · She will have a 2D echo bubble study done tomorrow her previous echo normal EF -  Follow up on the official results of an echo with Dr Chloé Garsia extremity edema   Assessment & Plan    · Dc fluids   · Give lasix 40 mg iv x1  · Check vde even though on xarelto - negative  ·  patient had a 2D echo done in 2018 of May showed an ejection fraction normal little bit of weakness in the left ventricle normal right ventricular function I did not see any report of pulmonary hypertension there is no grade 1 or grade 2 grade 3 diastolic dysfunction  ·   Another echo being done but this 1 is a bubble study to see if also there is a p o  Follow up on the results as an outpatient  ·  will send home Lasix 20 mg p o  Daily starting on 07/25  She did receive IV dose    Also Pt resting on gurney, NAD noted, respirations even and unlabored. Waiting for bed assignment.    stated to her elevate legs  LENARD on CPAP   Assessment & Plan    ·   Outpatient CPAP              Discharging Physician / Practitioner: Poonam Wilkes MD  PCP: Jazmine Hernandez MD  Admission Date:   Admission Orders     Ordered        07/24/18 1623  Inpatient Admission (expected length of stay for this patient is greater than two midnights)  Once             Discharge Date: 07/25/18    Resolved Problems  Date Reviewed: 7/25/2018    None          Consultations During Hospital Stay:  ·  neurology    Procedures Performed:     · none    Significant Findings / Test Results:     ·  chest x-ray done on 07/24-neg  ·  CT head CT a neck and head vessels negative  ·  MRI of the brain- small infarct identified within the  Superior aspect of the cerebellar vermis extending inferiorly and laterally into the medial aspect of the right cerebellum  No mass affect of hemorrhage transformation  ·  venous duplex was negative for any acute DVT  ·  echo results when I am writing this discharge- left ventricle is normal in size and systolic function is in atrial fibrillation there is mild pulmonary hypertension which is new from previous echo finding as reported also there is a suspected moderate tricuspid regurgitation  Incidental Findings:   · none     Test Results Pending at Discharge (will require follow up):   · none     Outpatient Tests Requested:  · none    Complications:  none    Reason for Admission:   Slurry speech and dizziness for 40 min    Hospital Course:     Tanna Dawkins is a 65 y o  female patient who originally presented to the hospital on 7/24/2018 due to  Due to an episode of slurred speech and dizziness lasted 40 min  She had initially CT scan and CTs done which were negative admitted for a stroke pathway she was found to have a small embolic infarcts in the cerebellum    Her symptoms have not reoccurred her echo did not have that much changes just new mild pulmonary hypertension and possibility of increase in tricuspid regurgitation  Discussed with Neurology in also with her cardiologist Dr Hayes Speaker will keep on Xarelto 20 mg daily added aspirin 81 mg daily  Patient initially when came in her heart rate was 109 but the RVR resolved resumed her metoprolol also give her little bit of fluids at it was hot outside  Next days she had a +2 pitting edema VD was done was negative fluids were stopped I gave her dose of Lasix and also put her on Lasix 20 mg p o  Daily for now told her to elevate her legs as well  Patient was discussed with the patient to keep her another night here over night to observe but she was adamant about going home which was okay but discussed with her any symptomology return to return to the ER  Started on statin as well lipid better LDL 90 the rest labs normal    Please see above list of diagnoses and related plan for additional information  Condition at Discharge: stable     Discharge Day Visit / Exam:     * Please refer to separate progress note for these details *    Discussion with Family: yes    Discharge instructions/Information to patient and family:   See after visit summary for information provided to patient and family  Provisions for Follow-Up Care:  See after visit summary for information related to follow-up care and any pertinent home health orders  Disposition:     Home    For Discharges to [address] [facility]:   · Not Applicable to this Patient - Not Applicable to this Patient    Planned Readmission: no     Discharge Statement:  I spent > 35 minutes discharging the patient  This time was spent on the day of discharge  I had direct contact with the patient on the day of discharge  Greater than 50% of the total time was spent examining patient, answering all patient questions, arranging and discussing plan of care with patient as well as directly providing post-discharge instructions  Additional time then spent on discharge activities      Discharge Medications:  See after visit summary for reconciled discharge medications provided to patient and family        ** Please Note: This note has been constructed using a voice recognition system **

## 2024-05-09 NOTE — PHYSICIAN ADVISOR
Current patient class: Inpatient  The patient is currently on Hospital Day: 2 at [address]      The patient was admitted to the hospital at 1623 on 7/24/18 for the following diagnosis:  Other specified transient cerebral ischemias [G45 8]  TIA (transient ischemic attack) [G45 9]  Dizziness [R42]  Class 2 severe obesity due to excess calories with serious comorbidity and body mass index (BMI) of 35 0 to 35 9 in adult ([facility] [address] ) [E66 01, Z68 35]       There is documentation in the medical record of an expected length of stay of at least 2 midnights  The patient is therefore expected to satisfy the 2 midnight benchmark and given the 2 midnight presumption is appropriate for INPATIENT ADMISSION  Given this expectation of a satisfying stay, CMS instructs us that the patient is most often appropriate for inpatient admission under part A provided medical necessity is documented in the chart  After review of the relevant documentation, labs, vital signs and test results, the patient is appropriate for INPATIENT ADMISSION  Admission to the hospital as an inpatient is a complex decision making process which requires the practitioner to consider the patients presenting complaint, history and physical examination and all relevant testing  With this in mind, in this case, the patient was deemed appropriate for INPATIENT ADMISSION  After review of the documentation and testing available at the time of the admission I concur with this clinical determination of medical necessity  Rationale is as follows:     The patient is a 65 yrs old Female who presented to the ED at 7/24/2018  1:41 PM with a chief complaint of Dizziness (pt states she had a 40 minute episode of dizziness after taking a brief walk in the heat outside, reports feeling near-syncopal but denies losing consciousness, currently denies any symptoms, blood sugar 102 pta)     Given the need for further hospitalization, and along with the documentation of medical necessity present in the chart, the patient is appropriate for inpatient admission  The patient is expected to satisfy the 2 midnight benchmark, and will require further acute medical care  The patient does have comorbid conditions which increases the risk for significant adverse outcome  Given this the patient is appropriate for inpatient admission  The patients vitals on arrival were ED Triage Vitals [07/24/18 1342]   Temperature Pulse Respirations Blood Pressure SpO2   98 °F (36 7 °C) (!) 118 18 136/76 96 %      Temp Source Heart Rate Source Patient Position - Orthostatic VS BP Location FiO2 (%)   Temporal Monitor Lying Left arm --      Pain Score       No Pain           Past Medical History:   Diagnosis Date    Atrial fibrillation (HCC)      Past Surgical History:   Procedure Laterality Date    BREAST SURGERY             Consults have been placed to:   IP CONSULT TO NEUROLOGY  IP CONSULT TO PHYSICAL MEDICINE REHAB  IP CONSULT TO NEUROLOGY    Vitals:    07/25/18 0600 07/25/18 0643 07/25/18 0800 07/25/18 1300   BP: 149/71  144/74 126/82   BP Location: Right arm  Left arm Left arm   Pulse: 97  60 99   Resp: 16  15 16   Temp: (!) 96 5 °F (35 8 °C)  (!) 96 1 °F (35 6 °C) (!) 97 3 °F (36 3 °C)   TempSrc: Temporal  Temporal Temporal   SpO2: 92%  95%    Weight:  79 6 kg (175 lb 7 8 oz)     Height:           Most recent labs:    Recent Labs      07/24/18   1406  07/25/18   0448  07/25/18   0555   WBC  5 50  4 70   --    HGB  13 1  13 3   --    HCT  39 5  40 5   --    PLT  166  167   --    K  4 3  3 9   --    NA  142  141   --    CALCIUM  9 0  8 7   --    BUN  22  16   --    CREATININE  1 00  0 81   --    INR  1 22*   --   1 11*   TROPONINI  <0 01   --    --    AST  26   --    --    ALT  30   --    --    ALKPHOS  83   --    --    BILITOT  0 40   --    --        Scheduled Meds:  Continuous Infusions:  No current facility-administered medications for this encounter     PRN Meds:     Surgical procedures (if appropriate): Yes